# Patient Record
Sex: MALE | Race: WHITE
[De-identification: names, ages, dates, MRNs, and addresses within clinical notes are randomized per-mention and may not be internally consistent; named-entity substitution may affect disease eponyms.]

---

## 2021-07-13 ENCOUNTER — RX RENEWAL (OUTPATIENT)
Age: 81
End: 2021-07-13

## 2021-08-11 ENCOUNTER — NON-APPOINTMENT (OUTPATIENT)
Age: 81
End: 2021-08-11

## 2021-08-11 ENCOUNTER — APPOINTMENT (OUTPATIENT)
Dept: INTERNAL MEDICINE | Facility: CLINIC | Age: 81
End: 2021-08-11
Payer: MEDICARE

## 2021-08-11 ENCOUNTER — LABORATORY RESULT (OUTPATIENT)
Age: 81
End: 2021-08-11

## 2021-08-11 VITALS
TEMPERATURE: 98.1 F | OXYGEN SATURATION: 99 % | HEIGHT: 72 IN | RESPIRATION RATE: 17 BRPM | BODY MASS INDEX: 35.21 KG/M2 | SYSTOLIC BLOOD PRESSURE: 160 MMHG | WEIGHT: 260 LBS | DIASTOLIC BLOOD PRESSURE: 86 MMHG | HEART RATE: 77 BPM

## 2021-08-11 DIAGNOSIS — M19.90 UNSPECIFIED OSTEOARTHRITIS, UNSPECIFIED SITE: ICD-10-CM

## 2021-08-11 DIAGNOSIS — Z13.31 ENCOUNTER FOR SCREENING FOR DEPRESSION: ICD-10-CM

## 2021-08-11 DIAGNOSIS — Z78.9 OTHER SPECIFIED HEALTH STATUS: ICD-10-CM

## 2021-08-11 DIAGNOSIS — Z13.39 ENCOUNTER FOR SCREENING EXAM FOR OTHER MENTAL HEALTH AND BEHAVIORAL DISORDERS: ICD-10-CM

## 2021-08-11 DIAGNOSIS — E11.65 TYPE 2 DIABETES MELLITUS WITH OTHER SPECIFIED COMPLICATION: ICD-10-CM

## 2021-08-11 DIAGNOSIS — Z86.69 PERSONAL HISTORY OF OTHER DISEASES OF THE NERVOUS SYSTEM AND SENSE ORGANS: ICD-10-CM

## 2021-08-11 DIAGNOSIS — Z86.79 PERSONAL HISTORY OF OTHER DISEASES OF THE CIRCULATORY SYSTEM: ICD-10-CM

## 2021-08-11 DIAGNOSIS — Z87.09 PERSONAL HISTORY OF OTHER DISEASES OF THE RESPIRATORY SYSTEM: ICD-10-CM

## 2021-08-11 DIAGNOSIS — Z71.89 OTHER SPECIFIED COUNSELING: ICD-10-CM

## 2021-08-11 DIAGNOSIS — E11.69 TYPE 2 DIABETES MELLITUS WITH OTHER SPECIFIED COMPLICATION: ICD-10-CM

## 2021-08-11 PROCEDURE — 99214 OFFICE O/P EST MOD 30 MIN: CPT | Mod: 25

## 2021-08-11 PROCEDURE — 93000 ELECTROCARDIOGRAM COMPLETE: CPT | Mod: 59

## 2021-08-11 PROCEDURE — 99497 ADVNCD CARE PLAN 30 MIN: CPT

## 2021-08-11 PROCEDURE — G0444 DEPRESSION SCREEN ANNUAL: CPT | Mod: 59

## 2021-08-11 PROCEDURE — G0439: CPT

## 2021-08-11 PROCEDURE — G0442 ANNUAL ALCOHOL SCREEN 15 MIN: CPT | Mod: 59

## 2021-08-11 PROCEDURE — 36415 COLL VENOUS BLD VENIPUNCTURE: CPT

## 2021-08-11 PROCEDURE — G0447 BEHAVIOR COUNSEL OBESITY 15M: CPT | Mod: 59

## 2021-08-11 RX ORDER — HYDROCORTISONE 25 MG/G
2.5 CREAM TOPICAL 3 TIMES DAILY
Refills: 0 | Status: ACTIVE | COMMUNITY

## 2021-08-11 RX ORDER — TRAZODONE HYDROCHLORIDE 50 MG/1
50 TABLET ORAL
Refills: 0 | Status: ACTIVE | COMMUNITY

## 2021-08-11 RX ORDER — NEOMYCIN AND POLYMYXIN B SULFATES, BACITRACIN ZINC, AND HYDROCORTISONE 3.5; 5000; 400; 1 MG/G; [IU]/G; [IU]/G; MG/G
OINTMENT TOPICAL
Refills: 0 | Status: ACTIVE | COMMUNITY

## 2021-08-11 RX ORDER — TAMSULOSIN HYDROCHLORIDE 0.4 MG/1
0.4 CAPSULE ORAL
Refills: 0 | Status: ACTIVE | COMMUNITY

## 2021-08-12 LAB
25(OH)D3 SERPL-MCNC: 36.3 NG/ML
ALBUMIN SERPL ELPH-MCNC: 4.9 G/DL
ALP BLD-CCNC: 61 U/L
ALT SERPL-CCNC: 22 U/L
ANION GAP SERPL CALC-SCNC: 16 MMOL/L
APPEARANCE: ABNORMAL
AST SERPL-CCNC: 19 U/L
BASOPHILS # BLD AUTO: 0.03 K/UL
BASOPHILS NFR BLD AUTO: 0.5 %
BILIRUB SERPL-MCNC: 1 MG/DL
BILIRUBIN URINE: NEGATIVE
BLOOD URINE: NEGATIVE
BUN SERPL-MCNC: 25 MG/DL
CALCIUM SERPL-MCNC: 9.7 MG/DL
CHLORIDE SERPL-SCNC: 103 MMOL/L
CHOLEST SERPL-MCNC: 222 MG/DL
CO2 SERPL-SCNC: 25 MMOL/L
COLOR: YELLOW
CREAT SERPL-MCNC: 1.64 MG/DL
EOSINOPHIL # BLD AUTO: 0.25 K/UL
EOSINOPHIL NFR BLD AUTO: 4.2 %
ESTIMATED AVERAGE GLUCOSE: 151 MG/DL
GGT SERPL-CCNC: 26 U/L
GLUCOSE QUALITATIVE U: NEGATIVE
GLUCOSE SERPL-MCNC: 139 MG/DL
HBA1C MFR BLD HPLC: 6.9 %
HCT VFR BLD CALC: 39.6 %
HDLC SERPL-MCNC: 54 MG/DL
HGB BLD-MCNC: 12.2 G/DL
IMM GRANULOCYTES NFR BLD AUTO: 0.2 %
KETONES URINE: NEGATIVE
LDLC SERPL CALC-MCNC: 144 MG/DL
LEUKOCYTE ESTERASE URINE: NEGATIVE
LYMPHOCYTES # BLD AUTO: 1.45 K/UL
LYMPHOCYTES NFR BLD AUTO: 24.5 %
MAN DIFF?: NORMAL
MCHC RBC-ENTMCNC: 30.4 PG
MCHC RBC-ENTMCNC: 30.8 GM/DL
MCV RBC AUTO: 98.8 FL
MONOCYTES # BLD AUTO: 0.7 K/UL
MONOCYTES NFR BLD AUTO: 11.8 %
NEUTROPHILS # BLD AUTO: 3.49 K/UL
NEUTROPHILS NFR BLD AUTO: 58.8 %
NITRITE URINE: NEGATIVE
NONHDLC SERPL-MCNC: 168 MG/DL
PH URINE: 5.5
PLATELET # BLD AUTO: 273 K/UL
POTASSIUM SERPL-SCNC: 5.2 MMOL/L
PROT SERPL-MCNC: 7.5 G/DL
PROTEIN URINE: NORMAL
PSA FREE FLD-MCNC: 34 %
PSA FREE SERPL-MCNC: 0.42 NG/ML
PSA SERPL-MCNC: 1.25 NG/ML
RBC # BLD: 4.01 M/UL
RBC # FLD: 13 %
SODIUM SERPL-SCNC: 144 MMOL/L
SPECIFIC GRAVITY URINE: 1.02
TRIGL SERPL-MCNC: 122 MG/DL
TSH SERPL-ACNC: 1.42 UIU/ML
UROBILINOGEN URINE: NORMAL
WBC # FLD AUTO: 5.93 K/UL

## 2021-08-13 ENCOUNTER — NON-APPOINTMENT (OUTPATIENT)
Age: 81
End: 2021-08-13

## 2021-08-15 NOTE — HEALTH RISK ASSESSMENT
[Good] : ~his/her~  mood as  good [1 or 2 (0 pts)] : 1 or 2 (0 points) [Never (0 pts)] : Never (0 points) [No falls in past year] : Patient reported no falls in the past year [0] : 2) Feeling down, depressed, or hopeless: Not at all (0) [PHQ-2 Negative - No further assessment needed] : PHQ-2 Negative - No further assessment needed [I have developed a follow-up plan documented below in the note.] : I have developed a follow-up plan documented below in the note. [Patient reported colonoscopy was normal] : Patient reported colonoscopy was normal [None] : None [Alone] : lives alone [Employed] : employed [College] : College [Single] : single [Feels Safe at Home] : Feels safe at home [Fully functional (bathing, dressing, toileting, transferring, walking, feeding)] : Fully functional (bathing, dressing, toileting, transferring, walking, feeding) [Fully functional (using the telephone, shopping, preparing meals, housekeeping, doing laundry, using] : Fully functional and needs no help or supervision to perform IADLs (using the telephone, shopping, preparing meals, housekeeping, doing laundry, using transportation, managing medications and managing finances) [Patient/Caregiver not ready to engage] : , patient/caregiver not ready to engage [Last Verification Date: ___] : Last Verification Date: [unfilled] [I will adhere to the patient's wishes.] : I will adhere to the patient's wishes. [Time Spent: ___ minutes] : Time Spent: [unfilled] minutes [No] : In the past 12 months have you used drugs other than those required for medical reasons? No [] : No [de-identified] : Lightly [Audit-CScore] : 0 [de-identified] : Standard [VIM8Donwn] : 0 [Change in mental status noted] : No change in mental status noted [Sexually Active] : not sexually active [Reports changes in hearing] : Reports no changes in hearing [Reports changes in vision] : Reports no changes in vision [ColonoscopyDate] : 2019 [FreeTextEntry2] :  [AdvancecareDate] : 08/21

## 2021-08-15 NOTE — ASSESSMENT
[FreeTextEntry1] : Medical Annual wellness visit completed:\par HRA completed and reviewed with patient\par Medical, family, surgical history reviewed with patient and updated\par List of current providers r/w patient and updated\par Vitals, BMI reviewed and discussed along with healthy BMI goals. Dietary counseling x 15 minutes provided\par Depression PHQ 9 completed and reviewed \par Annual safety assessment reviewed\par discussed advanced directives\par smoking cessation counseling provided\par Established routine screening and immunization schedules\par \par VACCINATION & OTHER TX RECOMMENDATIONS\par \par ASA preventative therapy\par Calcium/Vitamin D supplementation\par  \par Dietary counseling, nutrition referral\par risks vs. benefits d/w patient. routine vaccination and vaccination schedules and recommendation d/w patient\par \par Vaccines recommended: \par * pneumovax (once after 65) & prevnar\par * annual Influenza vaccine\par * Hep B vaccines\par * zostavax\par * Tdap\par \par Colorectal screening recommended; screening colonoscopy q10yr, flex sig q5yr, annual fecal occult testing\par BMD recommended biennially for osteoporosis screening\par Glaucoma screening recommended, annual optho evals\par Cardiovascular screening and blood tests recommended and discussed w/ patient, cholesterol screening and dietary counseling\par AAA recommended x 1\par \par \par DIABETIC recommendations:\par med nutrition counseling, nutrition referral \par annual podiatry evaluations and follow up\par annual optho eval/retinal exams\par routine blood tets, including FBS, a1c% and cholesterol panels\par \par \par Met with MANJU NEVES, who was willing to discuss advance care planning. \par Our advance care planning conversation included a discussion about:\par 1. The value and importance of advance care planning.\par 2. Experiences with loved ones who have been seriously ill or have .\par 3. Exploration of personal, cultural, or spiritual beliefs that might influence medical decisions.\par 4. Exploration of goals of care in the event of a sudden injury or illness.\par 5. Identification of a health care agent.\par 6. Review and update, or completion of, an advance directive.\par Start time: 10 End time: 1015\par \par diet and exercise weight loss.  Low-salt low-fat ADA diet/ htn- Discussed diabetes physiology\par - Discussed importance of monitoring blood glucose levels\par - Encouraged a low fat/low cholesterol diet\par - Discussed symptoms of hyperglycemia and hypoglycemia\par - Discussed ADA glucose goals\par - Discussed  HGB A1c and the effects of blood glucose on the level\par - Discussed Healthy eating, avoidance of concentrated sweets, and to include vegetables by at least 2 meals a day\par - Discussed regular exercise\par - Discussed importance of follow up physician visits Limit intake of Sodium (Salt) to less than 2 grams a day to prevent fluid retention-swelling or worsening of symptoms. The importance of keeping the blood pressure at or below 130/80 to prevent stroke, heart attacks, kidney failure, blindness, and loss of limbs was  low chol diet. Avoid fried foods, red meat, butter, eggs, hard cheeses. Use canola or olive oil preferred. ::  was established in which goals would be set, monitoring would be done, and problem solving would also be addressed. The patient would be assisted using behavior change techniques, such as self-help and counseling through behavioral modification: Problem solving using hypnosis and positive medical reinforcement to achieve agreed-upon goals.\par \par Symptomatic patients : Test for influenza, if positive, treat for influenza and do not continue below. \par 1. Fever plus cough or shortness of breath : Test for RVP and COVID-19.\par 2.Indirect, circumstantial or unclear exposure to COVID-19, or other concerning cases not meeting above criteria: Please call [facility] to discuss testing. \par +++ All above cases must be reported to the [facility] registry. +++\par \par Asymptomatic patients: \par 1. Known first-degree direct-contact exposure to positive COVID-19 patient but asymptomatic: No testing PLUS 14 day self-quarantine. Pt to call if symptoms develop. Report to [facility] Registry.\par 2. No known exposure and asymptomatic, referred from outside healthcare organization: Please call [facility] to discuss testing. \par 3.All other asymptomatic patients with no known exposures: no testing, no exceptions.\par \par \par

## 2021-08-15 NOTE — COUNSELING
[Fall prevention counseling provided] : Fall prevention counseling provided [Adequate lighting] : Adequate lighting [No throw rugs] : No throw rugs [Use proper foot wear] : Use proper foot wear [Use recommended devices] : Use recommended devices [Behavioral health counseling provided] : Behavioral health counseling provided [Sleep ___ hours/day] : Sleep [unfilled] hours/day [Engage in a relaxing activity] : Engage in a relaxing activity [Plan in advance] : Plan in advance [Potential consequences of obesity discussed] : Potential consequences of obesity discussed [Benefits of weight loss discussed] : Benefits of weight loss discussed [Structured Weight Management Program suggested:] : Structured weight management program suggested [Encouraged to maintain food diary] : Encouraged to maintain food diary [Encouraged to increase physical activity] : Encouraged to increase physical activity [Encouraged to use exercise tracking device] : Encouraged to use exercise tracking device [Target Wt Loss Goal ___] : Weight Loss Goals: Target weight loss goal [unfilled] lbs [Weigh Self Weekly] : weigh self weekly [Decrease Portions] : decrease portions [____ min/wk Activity] : [unfilled] min/wk activity [Keep Food Diary] : keep food diary [FreeTextEntry4] : 15 [de-identified] : Dscd.D/E wt.loss needs to loose 10% TBW.DSCD.self monitoring, goal setting,problem solving w-b mod.portion control, avoidence of skipping meals, high glycemic foods,snacking and mindless eating in front of the tv.Suggested use of small plates and not keepingall of the food on the dinner table and leaving it at the stove top.Pt was also told to calorie count and an donna was recommended DSCD exercising 20 minutes per day:dscd:med /pharmaco bariatric tx options.\par \par  - Encouraged a low fat/low cholesterol diet\par  - Discussed Healthy eating, avoidance of concentrated sweets, and to include vegetables by at least 3 meals a day\par  - encouraged low glycemic fruits, grains and vegetables and a diet high in plant protein\par  - Discussed regular exercise\par  - Discussed importance of follow up physician visits\par \par - Discussed diabetes physiology\par - Discussed importance of monitoring blood glucose levels\par - Encouraged a low fat/low cholesterol diet\par - Discussed symptoms of hyperglycemia and hypoglycemia\par - Discussed ADA glucose goals\par - Discussed  HGB A1c and the effects of blood glucose on the level\par - Discussed Healthy eating, avoidance of concentrated sweets, and to include vegetables by at least 2 meals a day\par - Discussed regular exercise\par - Discussed importance of follow up physician visits\par \par \par \par low chol diet. Avoid fried foods, red meat, butter, eggs, hard cheeses. Use canola or olive oil preferred.\par \par  - Encouraged a low fat/low cholesterol diet\par  - Discussed Healthy eating, avoidance of concentrated sweets, and to include vegetables by at least 3 meals a day\par  - encouraged low glycemic fruits, grains and vegetables and a diet high in plant protein\par  - Discussed regular exercise\par  - Discussed importance of follow up physician visits\par \par Bp stable, continue with medications, dash diet, exercise, and dietary management.Continue to check home Bp’s.\par \par  [None] : None

## 2021-08-15 NOTE — HISTORY OF PRESENT ILLNESS
[FreeTextEntry1] : f/u, GHM, hx of diabetes, obesity, colon ca, lipids, annual wellness, arthritis [de-identified] : 81 yr old male pt presents for f/u, GHM, annual wellness\par \par General health maintenance diabetes, obesity, colon ca, lipids, routine blood work. \par Pt is a retired , Gnosticism\par Denies chest pain, shortness of breath and dizziness.\par Patient has a history of colon cancer had a resection.  Patient had colonoscopy in 2019 which was unremarkable. \par Patient denies fever chills cough chest pain shortness of breath patient admits to weight gain. \par \par Pt voices no further complaints.\par ROS as documented below.

## 2021-09-22 ENCOUNTER — RX RENEWAL (OUTPATIENT)
Age: 81
End: 2021-09-22

## 2021-11-26 ENCOUNTER — APPOINTMENT (OUTPATIENT)
Dept: INTERNAL MEDICINE | Facility: CLINIC | Age: 81
End: 2021-11-26
Payer: MEDICARE

## 2021-11-26 VITALS
RESPIRATION RATE: 17 BRPM | TEMPERATURE: 97.4 F | BODY MASS INDEX: 33.86 KG/M2 | DIASTOLIC BLOOD PRESSURE: 84 MMHG | OXYGEN SATURATION: 97 % | HEIGHT: 72 IN | WEIGHT: 250 LBS | SYSTOLIC BLOOD PRESSURE: 152 MMHG | HEART RATE: 89 BPM

## 2021-11-26 PROCEDURE — 99213 OFFICE O/P EST LOW 20 MIN: CPT

## 2021-11-26 NOTE — REVIEW OF SYSTEMS
[Negative] : Heme/Lymph [Nasal Discharge] : nasal discharge [Sore Throat] : sore throat [Cough] : cough

## 2021-11-27 NOTE — COUNSELING
[Fall prevention counseling provided] : Fall prevention counseling provided [Adequate lighting] : Adequate lighting [No throw rugs] : No throw rugs [Use proper foot wear] : Use proper foot wear [Use recommended devices] : Use recommended devices [de-identified] : - Discussed diabetes physiology\par - Discussed importance of monitoring blood glucose levels\par - Encouraged a low fat/low cholesterol diet\par - Discussed symptoms of hyperglycemia and hypoglycemia\par - Discussed ADA glucose goals\par - Discussed  HGB A1c and the effects of blood glucose on the level\par - Discussed Healthy eating, avoidance of concentrated sweets, and to include vegetables by at least 2 meals a day\par - Discussed regular exercise\par - Discussed importance of follow up physician visits\par \par \par \par low chol diet. Avoid fried foods, red meat, butter, eggs, hard cheeses. Use canola or olive oil preferred.\par \par  - Encouraged a low fat/low cholesterol diet\par  - Discussed Healthy eating, avoidance of concentrated sweets, and to include vegetables by at least 3 meals a day\par  - encouraged low glycemic fruits, grains and vegetables and a diet high in plant protein\par  - Discussed regular exercise\par  - Discussed importance of follow up physician visits\par \par Symptomatic patients : Test for influenza, if positive, treat for influenza and do not continue below. \par 1. Fever plus cough or shortness of breath : Test for RVP and COVID-19.\par 2.Indirect, circumstantial or unclear exposure to COVID-19, or other concerning cases not meeting above criteria: Please call [facility] to discuss testing. \par +++ All above cases must be reported to the [facility] registry. +++\par \par Asymptomatic patients: \par 1. Known first-degree direct-contact exposure to positive COVID-19 patient but asymptomatic: No testing PLUS 14 day self-quarantine. Pt to call if symptoms develop. Report to [facility].\par 2. No known exposure and asymptomatic, referred from outside healthcare organization: Please call [facility] to discuss testing. \par 3.All other asymptomatic patients with no known exposures: no testing, no exceptions.\par \par  [None] : None

## 2021-11-27 NOTE — HEALTH RISK ASSESSMENT
[Good] : ~his/her~  mood as  good [] : No [No] : No [No falls in past year] : Patient reported no falls in the past year [Change in mental status noted] : No change in mental status noted [Language] : denies difficulty with language [None] : None [Alone] : lives alone [College] : College [Single] : single [FreeTextEntry2] :

## 2021-11-27 NOTE — PHYSICAL EXAM

## 2021-11-27 NOTE — HISTORY OF PRESENT ILLNESS
[FreeTextEntry1] : cc/cough, sneezing, f/u, GHM, hx of diabetes, obesity, colon ca, lipids, arthritis [de-identified] : 81 yr old male pt presents for f/u, GHM\par \par 82 y/o M presents with an acute c/o mild cough, fatigue and sneezing onset x ~ 1 week\par UTD on Covid Vacc\par \par General health maintenance diabetes, obesity, colon ca, lipids\par Pt is a retired , [employer]\par Denies chest pain, shortness of breath and dizziness.\par Patient has a history of colon cancer had a resection.  Patient had colonoscopy in 2019 which was unremarkable. \par Patient denies fever chills cough chest pain shortness of breath patient admits to weight gain. \par \par Pt voices no further complaints.\par ROS as documented below.

## 2021-12-22 ENCOUNTER — APPOINTMENT (OUTPATIENT)
Dept: INTERNAL MEDICINE | Facility: CLINIC | Age: 81
End: 2021-12-22
Payer: MEDICARE

## 2021-12-22 VITALS
BODY MASS INDEX: 33.86 KG/M2 | RESPIRATION RATE: 18 BRPM | SYSTOLIC BLOOD PRESSURE: 154 MMHG | WEIGHT: 250 LBS | HEIGHT: 72 IN | OXYGEN SATURATION: 95 % | DIASTOLIC BLOOD PRESSURE: 86 MMHG | HEART RATE: 74 BPM

## 2021-12-22 DIAGNOSIS — J06.9 ACUTE UPPER RESPIRATORY INFECTION, UNSPECIFIED: ICD-10-CM

## 2021-12-22 DIAGNOSIS — Z20.822 CONTACT WITH AND (SUSPECTED) EXPOSURE TO COVID-19: ICD-10-CM

## 2021-12-22 PROCEDURE — 99213 OFFICE O/P EST LOW 20 MIN: CPT | Mod: CS

## 2021-12-22 RX ORDER — AZITHROMYCIN 250 MG/1
250 TABLET, FILM COATED ORAL
Qty: 1 | Refills: 0 | Status: ACTIVE | COMMUNITY
Start: 2021-11-26 | End: 1900-01-01

## 2021-12-22 RX ORDER — AZITHROMYCIN 250 MG/1
250 TABLET, FILM COATED ORAL
Qty: 1 | Refills: 0 | Status: ACTIVE | COMMUNITY
Start: 2021-12-22 | End: 1900-01-01

## 2021-12-23 PROBLEM — J06.9 URI, ACUTE: Status: RESOLVED | Noted: 2021-11-26 | Resolved: 2021-12-26

## 2021-12-23 PROBLEM — Z20.822 CONTACT WITH AND (SUSPECTED) EXPOSURE TO COVID-19: Status: ACTIVE | Noted: 2021-12-22

## 2021-12-23 NOTE — HISTORY OF PRESENT ILLNESS
[Patient presents to the office today for COVID-19 evaluation and testing.] : Patient presents to the office today for COVID-19 evaluation and testing. [With Confirmed Case] : patient exposed to a confirmed case of COVID-19 [Age >= 60 years] : age >= 60 years [None] : none [Clear] : clear [Speaks in full sentences] : speaks in full sentences [Normal O2 sat at rest] : normal O2 sat at rest [Grossly normal, interacts, not tired or weak] : grossly normal, interacts, not tired or weak [COVID-19 testing ordered and specimen obtained] : COVID-19 testing ordered and specimen obtained [] : mild cough [FreeTextEntry1] : 81 year old male presents himself today for a F/U,GHM.\par Hx of hyperlipidemia,colon cancer,diabetes and obesity.\par \par Patient is here today because he was exposed a few days ago with a positive covid.\par He is fully vaccinated for covid-19.\par Patient has been having nasal congestion and a sore throat.\par Otherwise patient is stable.\par \par Voices no further complaints.\par ROS as documented below.\par Denies fever,cough,chills,body aches and SOB.

## 2021-12-24 ENCOUNTER — NON-APPOINTMENT (OUTPATIENT)
Age: 81
End: 2021-12-24

## 2021-12-24 LAB
RAPID RVP RESULT: DETECTED
SARS-COV-2 RNA PNL RESP NAA+PROBE: DETECTED

## 2021-12-24 NOTE — HISTORY OF PRESENT ILLNESS
[Home] : at home, [unfilled] , at the time of the visit. [Verbal consent obtained from patient] : the patient, [unfilled] [FreeTextEntry8] : Spoke with patient this morning has nasal congestion denies shortness of breath or change in mental status was told to quarantine for 10 days. Symptomatic patients : Test for influenza, if positive, treat for influenza and do not continue below. \par 1. Fever plus cough or shortness of breath : Test for RVP and COVID-19.\par 2.Indirect, circumstantial or unclear exposure to COVID-19, or other concerning cases not meeting above criteria: Please call [facility] to discuss testing. \par +++ All above cases must be reported to the [facility] registry. +++\par \par Asymptomatic patients: \par 1. Known first-degree direct-contact exposure to positive COVID-19 patient but asymptomatic: No testing PLUS 14 day self-quarantine. Pt to call if symptoms develop. Report to [facility] Registry.\par 2. No known exposure and asymptomatic, referred from outside healthcare organization: Please call [facility] to discuss testing. \par 3.All other asymptomatic patients with no known exposures: no testing, no exceptions.\par \par

## 2022-01-09 ENCOUNTER — RX RENEWAL (OUTPATIENT)
Age: 82
End: 2022-01-09

## 2022-02-04 ENCOUNTER — RX RENEWAL (OUTPATIENT)
Age: 82
End: 2022-02-04

## 2022-03-04 ENCOUNTER — APPOINTMENT (OUTPATIENT)
Dept: INTERNAL MEDICINE | Facility: CLINIC | Age: 82
End: 2022-03-04
Payer: MEDICARE

## 2022-03-04 VITALS
DIASTOLIC BLOOD PRESSURE: 90 MMHG | HEIGHT: 72 IN | BODY MASS INDEX: 35.21 KG/M2 | HEART RATE: 84 BPM | OXYGEN SATURATION: 97 % | TEMPERATURE: 97.4 F | WEIGHT: 260 LBS | RESPIRATION RATE: 17 BRPM | SYSTOLIC BLOOD PRESSURE: 172 MMHG

## 2022-03-04 DIAGNOSIS — U09.9 POST COVID-19 CONDITION, UNSPECIFIED: ICD-10-CM

## 2022-03-04 DIAGNOSIS — Z85.038 PERSONAL HISTORY OF OTHER MALIGNANT NEOPLASM OF LARGE INTESTINE: ICD-10-CM

## 2022-03-04 DIAGNOSIS — R09.82 POSTNASAL DRIP: ICD-10-CM

## 2022-03-04 DIAGNOSIS — R41.89 OTHER SYMPTOMS AND SIGNS INVOLVING COGNITIVE FUNCTIONS AND AWARENESS: ICD-10-CM

## 2022-03-04 PROCEDURE — 99214 OFFICE O/P EST MOD 30 MIN: CPT | Mod: 25

## 2022-03-04 PROCEDURE — 36415 COLL VENOUS BLD VENIPUNCTURE: CPT

## 2022-03-06 PROBLEM — R09.82 POSTNASAL DRIP: Status: ACTIVE | Noted: 2022-03-06

## 2022-03-06 PROBLEM — Z85.038 HISTORY OF MALIGNANT NEOPLASM OF COLON: Status: RESOLVED | Noted: 2021-08-11 | Resolved: 2022-03-06

## 2022-03-06 LAB
25(OH)D3 SERPL-MCNC: 37.4 NG/ML
ALBUMIN SERPL ELPH-MCNC: 5 G/DL
ALP BLD-CCNC: 60 U/L
ALT SERPL-CCNC: 21 U/L
ANION GAP SERPL CALC-SCNC: 15 MMOL/L
APPEARANCE: CLEAR
AST SERPL-CCNC: 20 U/L
BASOPHILS # BLD AUTO: 0.03 K/UL
BASOPHILS NFR BLD AUTO: 0.5 %
BILIRUB SERPL-MCNC: 0.8 MG/DL
BILIRUBIN URINE: NEGATIVE
BLOOD URINE: NEGATIVE
BUN SERPL-MCNC: 32 MG/DL
CALCIUM SERPL-MCNC: 10 MG/DL
CHLORIDE SERPL-SCNC: 104 MMOL/L
CHOLEST SERPL-MCNC: 221 MG/DL
CO2 SERPL-SCNC: 22 MMOL/L
COLOR: NORMAL
CREAT SERPL-MCNC: 1.47 MG/DL
EGFR: 48 ML/MIN/1.73M2
EOSINOPHIL # BLD AUTO: 0.19 K/UL
EOSINOPHIL NFR BLD AUTO: 3.2 %
ESTIMATED AVERAGE GLUCOSE: 134 MG/DL
GGT SERPL-CCNC: 24 U/L
GLUCOSE QUALITATIVE U: NEGATIVE
GLUCOSE SERPL-MCNC: 101 MG/DL
HBA1C MFR BLD HPLC: 6.3 %
HCT VFR BLD CALC: 40.2 %
HDLC SERPL-MCNC: 61 MG/DL
HGB BLD-MCNC: 12.5 G/DL
IMM GRANULOCYTES NFR BLD AUTO: 0.2 %
KETONES URINE: NEGATIVE
LDLC SERPL CALC-MCNC: 136 MG/DL
LEUKOCYTE ESTERASE URINE: NEGATIVE
LYMPHOCYTES # BLD AUTO: 1.12 K/UL
LYMPHOCYTES NFR BLD AUTO: 18.7 %
MAN DIFF?: NORMAL
MCHC RBC-ENTMCNC: 30.6 PG
MCHC RBC-ENTMCNC: 31.1 GM/DL
MCV RBC AUTO: 98.5 FL
MONOCYTES # BLD AUTO: 0.77 K/UL
MONOCYTES NFR BLD AUTO: 12.8 %
NEUTROPHILS # BLD AUTO: 3.88 K/UL
NEUTROPHILS NFR BLD AUTO: 64.6 %
NITRITE URINE: NEGATIVE
NONHDLC SERPL-MCNC: 160 MG/DL
PH URINE: 5
PLATELET # BLD AUTO: 254 K/UL
POTASSIUM SERPL-SCNC: 5.5 MMOL/L
PROT SERPL-MCNC: 7.2 G/DL
PROTEIN URINE: NORMAL
PSA SERPL-MCNC: 1.23 NG/ML
RBC # BLD: 4.08 M/UL
RBC # FLD: 13 %
SODIUM SERPL-SCNC: 141 MMOL/L
SPECIFIC GRAVITY URINE: 1.02
TRIGL SERPL-MCNC: 117 MG/DL
TSH SERPL-ACNC: 1.68 UIU/ML
UROBILINOGEN URINE: NORMAL
WBC # FLD AUTO: 6 K/UL

## 2022-03-06 NOTE — PHYSICAL EXAM
[No Acute Distress] : no acute distress [Well Nourished] : well nourished [Well Developed] : well developed [Well-Appearing] : well-appearing [Normal Sclera/Conjunctiva] : normal sclera/conjunctiva [PERRL] : pupils equal round and reactive to light [EOMI] : extraocular movements intact [Normal Outer Ear/Nose] : the outer ears and nose were normal in appearance [No JVD] : no jugular venous distention [No Lymphadenopathy] : no lymphadenopathy [Supple] : supple [Thyroid Normal, No Nodules] : the thyroid was normal and there were no nodules present [No Respiratory Distress] : no respiratory distress  [No Accessory Muscle Use] : no accessory muscle use [Clear to Auscultation] : lungs were clear to auscultation bilaterally [Normal Rate] : normal rate  [Regular Rhythm] : with a regular rhythm [Normal S1, S2] : normal S1 and S2 [No Murmur] : no murmur heard [No Carotid Bruits] : no carotid bruits [No Abdominal Bruit] : a ~M bruit was not heard ~T in the abdomen [No Varicosities] : no varicosities [Pedal Pulses Present] : the pedal pulses are present [No Edema] : there was no peripheral edema [No Palpable Aorta] : no palpable aorta [No Extremity Clubbing/Cyanosis] : no extremity clubbing/cyanosis [Soft] : abdomen soft [Non Tender] : non-tender [Non-distended] : non-distended [No Masses] : no abdominal mass palpated [No HSM] : no HSM [Normal Bowel Sounds] : normal bowel sounds [Normal Posterior Cervical Nodes] : no posterior cervical lymphadenopathy [Normal Anterior Cervical Nodes] : no anterior cervical lymphadenopathy [No Spinal Tenderness] : no spinal tenderness [No Joint Swelling] : no joint swelling [Grossly Normal Strength/Tone] : grossly normal strength/tone [No Rash] : no rash [Coordination Grossly Intact] : coordination grossly intact [No Focal Deficits] : no focal deficits [Normal Gait] : normal gait [Deep Tendon Reflexes (DTR)] : deep tendon reflexes were 2+ and symmetric [Normal Affect] : the affect was normal [Normal Insight/Judgement] : insight and judgment were intact [de-identified] : Tenderness spasm lower back decreased range of motion [de-identified] : PNDrip visualized, no erythema/tonsillar edema/exudate [de-identified] : Low back pain

## 2022-03-06 NOTE — HEALTH RISK ASSESSMENT
[Good] : ~his/her~  mood as  good [Never] : Never [No] : No [No falls in past year] : Patient reported no falls in the past year [0] : 2) Feeling down, depressed, or hopeless: Not at all (0) [PHQ-2 Negative - No further assessment needed] : PHQ-2 Negative - No further assessment needed [None] : None [Alone] : lives alone [Employed] : employed [Single] : single [Feels Safe at Home] : Feels safe at home [Fully functional (bathing, dressing, toileting, transferring, walking, feeding)] : Fully functional (bathing, dressing, toileting, transferring, walking, feeding) [Fully functional (using the telephone, shopping, preparing meals, housekeeping, doing laundry, using] : Fully functional and needs no help or supervision to perform IADLs (using the telephone, shopping, preparing meals, housekeeping, doing laundry, using transportation, managing medications and managing finances) [Reviewed no changes] : Reviewed, no changes [Change in mental status noted] : No change in mental status noted [Language] : denies difficulty with language [Behavior] : denies difficulty with behavior [Learning/Retaining New Information] : denies difficulty learning/retaining new information [Handling Complex Tasks] : denies difficulty handling complex tasks [Spatial Ability and Orientation] : denies difficulty with spatial ability and orientation [FreeTextEntry2] : Jain

## 2022-03-06 NOTE — COUNSELING
[Fall prevention counseling provided] : Fall prevention counseling provided [Adequate lighting] : Adequate lighting [No throw rugs] : No throw rugs [Use proper foot wear] : Use proper foot wear [Use recommended devices] : Use recommended devices [Behavioral health counseling provided] : Behavioral health counseling provided [Sleep ___ hours/day] : Sleep [unfilled] hours/day [Engage in a relaxing activity] : Engage in a relaxing activity [Plan in advance] : Plan in advance [Potential consequences of obesity discussed] : Potential consequences of obesity discussed [Benefits of weight loss discussed] : Benefits of weight loss discussed [Structured Weight Management Program suggested:] : Structured weight management program suggested [Encouraged to maintain food diary] : Encouraged to maintain food diary [Encouraged to increase physical activity] : Encouraged to increase physical activity [Encouraged to use exercise tracking device] : Encouraged to use exercise tracking device [Target Wt Loss Goal ___] : Weight Loss Goals: Target weight loss goal [unfilled] lbs [Weigh Self Weekly] : weigh self weekly [Decrease Portions] : decrease portions [____ min/wk Activity] : [unfilled] min/wk activity [Keep Food Diary] : keep food diary [None] : None [Good understanding] : Patient has a good understanding of lifestyle changes and steps needed to achieve self management goal [de-identified] : Symptomatic patients : Test for influenza, if positive, treat for influenza and do not continue below. \par 1. Fever plus cough or shortness of breath : Test for RVP and COVID-19.\par 2.Indirect, circumstantial or unclear exposure to COVID-19, or other concerning cases not meeting above criteria: Please call [facility] to discuss testing. \par +++ All above cases must be reported to the [facility] registry. +++\par \par Asymptomatic patients: \par 1. Known first-degree direct-contact exposure to positive COVID-19 patient but asymptomatic: No testing PLUS 14 day self-quarantine. Pt to call if symptoms develop. Report to [facility] Registry.\par 2. No known exposure and asymptomatic, referred from outside healthcare organization: Please call [facility] to discuss testing. \par 3.All other asymptomatic patients with no known exposures: no testing, no exceptions.\par \par take medications as advised. Rest, ice/heat massage therapy/myofascial  release no heavy lift or twisting avoid prolonged positioning. We reviewed proper lifting mechanics continue with a home stretching program. Return to office in 2 weeks\par \par - Discussed diabetes physiology\par - Discussed importance of monitoring blood glucose levels\par - Encouraged a low fat/low cholesterol diet\par - Discussed symptoms of hyperglycemia and hypoglycemia\par - Discussed ADA glucose goals\par - Discussed  HGB A1c and the effects of blood glucose on the level\par - Discussed Healthy eating, avoidance of concentrated sweets, and to include vegetables by at least 2 meals a day\par - Discussed regular exercise\par - Discussed importance of follow up physician visits\par \par \par \par low chol diet. Avoid fried foods, red meat, butter, eggs, hard cheeses. Use canola or olive oil preferred.\par \par  - Encouraged a low fat/low cholesterol diet\par  - Discussed Healthy eating, avoidance of concentrated sweets, and to include vegetables by at least 3 meals a day\par  - encouraged low glycemic fruits, grains and vegetables and a diet high in plant protein\par  - Discussed regular exercise\par  - Discussed importance of follow up physician visits\par Dscd.D/E wt.loss needs to loose 10% TBW.DSCD.self monitoring, goal setting,problem solving w-b mod.portion control, avoidence of skipping meals, high glycemic foods,snacking and mindless eating in front of the tv.Suggested use of small plates and not keepingall of the food on the dinner table and leaving it at the stove top.Pt was also told to calorie count and an donna was recommended DSCD exercising 20 minutes per day:dscd:med /pharmaco bariatric tx options.\par \par  - Encouraged a low fat/low cholesterol diet\par  - Discussed Healthy eating, avoidance of concentrated sweets, and to include vegetables by at least 3 meals a day\par  - encouraged low glycemic fruits, grains and vegetables and a diet high in plant protein\par  - Discussed regular exercise\par  - Discussed importance of follow up physician visits\par \par 1. Make an appointment with your doctor\par Begin your fall-prevention plan by making an appointment with your doctor. Be prepared to answer questions such as:\par \par What medications are you taking? Make a list of your prescription and over-the-counter medications and supplements, or bring them with you to the appointment. Your doctor can review your medications for side effects and interactions that may increase your risk of falling. To help with fall prevention, your doctor may consider weaning you off medications that make you tired or affect your thinking, such as sedatives and some types of antidepressants.\par Have you fallen before? Write down the details, including when, where and how you fell. Be prepared to discuss instances when you almost fell but were caught by someone or managed to grab hold of something just in time. Details such as these may help your doctor identify specific fall-prevention strategies.\par Could your health conditions cause a fall? Certain eye and ear disorders may increase your risk of falls. Be prepared to discuss your health conditions and how comfortable you are when you walk - for example, do you feel any dizziness, joint pain, shortness of breath, or numbness in your feet and legs when you walk? Your doctor may evaluate your muscle strength, balance and walking style (gait) as well.\par 2. Keep moving\par Physical activity can go a long way toward fall prevention. With your doctor's OK, consider activities such as walking, water workouts or chapito chi - a gentle exercise that involves slow and graceful dance-like movements. Such activities reduce the risk of falls by improving strength, balance, coordination and flexibility.\par \par If you avoid physical activity because you're afraid it will make a fall more likely, tell your doctor. He or she may recommend carefully monitored exercise programs or refer you to a physical therapist. The physical therapist can create a custom exercise program aimed at improving your balance, flexibility, muscle strength and gait.\par \par 3. Wear sensible shoes\par Consider changing your footwear as part of your fall-prevention plan. High heels, floppy slippers and shoes with slick soles can make you slip, stumble and fall. So can walking in your stocking feet. Instead, wear properly fitting, sturdy shoes with nonskid soles. Sensible shoes may also reduce joint pain.\par \par 4. Remove home hazards\par Take a look around your home. Your living room, kitchen, bedroom, bathroom, hallways and stairways may be filled with hazards. To make your home safer:\par \par Remove boxes, newspapers, electrical cords and phone cords from walkways.\par Move coffee tables, magazine racks and plant stands from high-traffic areas.\par Secure loose rugs with double-faced tape, tacks or a slip-resistant backing - or remove loose rugs from your home.\par Repair loose, wooden floorboards and carpeting right away.\par Store clothing, dishes, food and other necessities within easy reach.\par Immediately clean spilled liquids, grease or food.\par Use nonslip mats in your bathtub or shower. Use a bath seat, which allows you to sit while showering.\par 5. Light up your living space\par Keep your home brightly lit to avoid tripping on objects that are hard to see. Also:\par \par Place night lights in your bedroom, bathroom and hallways.\par Place a lamp within reach of your bed for middle-of-the-night needs.\par Make clear paths to light switches that aren't near room entrances. Consider trading traditional switches for glow-in-the-dark or illuminated switches.\par Turn on the lights before going up or down stairs.\par Store flashlights in easy-to-find places in case of power outages.\par 6. Use assistive devices\par Your doctor might recommend using a cane or walker to keep you steady. Other assistive devices can help, too. For example:\par \par Hand rails for both sides of stairways\par Nonslip treads for bare-wood steps\par A raised toilet seat or one with armrests\par Grab bars for the shower or tub\par A sturdy plastic seat for the shower or tub - plus a hand-held shower nozzle for bathing while sitting down\par If necessary, ask your doctor for a referral to an occupational therapist. He or she can help you brainstorm other fall-prevention strategies. Some solutions are easily installed and relatively inexpensive. Others may require professional help or a larger investment. If you're concerned about the cost, remember that an investment in fall prevention is an investment in your independence.\par \par \par take medications as advised. Rest, ice/heat massage therapy/myofascial  release no heavy lift or twisting avoid prolonged positioning. We reviewed proper lifting mechanics continue with a home stretching program. Return to office in 2 weeks\par \par 15 minutes face-to-face counseling provided.\par

## 2022-03-06 NOTE — DATA REVIEWED
[FreeTextEntry1] : Hemoglobin 12.5 cholesterol 221 , potassium 5.5.  BUN 32 creatinine 1.47 GFR 48.  A1c 6.3.

## 2022-03-06 NOTE — HISTORY OF PRESENT ILLNESS
[FreeTextEntry1] :  f/u, GHM, hx of diabetes, obesity, colon ca, lipids, arthritis\par post covid syndrome- intermittent dyspnea, congestion\par intermittent low back pain [de-identified] : 81 yr old male pt presents for f/u, GHM\par c/o post covid syndrome, intermittent dyspnea, congestion, PNDrip, brain fog\par c/o intermittent low back pain\par UTD on Covid Vacc\par \par General health maintenance diabetes, obesity, colon ca, lipids\par Pt is a retired , Muslim\par Denies chest pain, shortness of breath and dizziness.\par Patient has a history of colon cancer had a resection.  Patient had colonoscopy in 2019 which was unremarkable. \par Patient denies fever chills cough chest pain shortness of breath patient admits to weight gain. \par \par Pt voices no further complaints.\par ROS as documented below.

## 2022-03-28 ENCOUNTER — RX CHANGE (OUTPATIENT)
Age: 82
End: 2022-03-28

## 2022-03-28 RX ORDER — MOMETASONE 50 UG/1
50 SPRAY, METERED NASAL
Qty: 17 | Refills: 2 | Status: DISCONTINUED | COMMUNITY
Start: 2022-03-04 | End: 2022-03-28

## 2022-04-20 ENCOUNTER — APPOINTMENT (OUTPATIENT)
Dept: INTERNAL MEDICINE | Facility: CLINIC | Age: 82
End: 2022-04-20
Payer: MEDICARE

## 2022-04-20 VITALS
DIASTOLIC BLOOD PRESSURE: 86 MMHG | SYSTOLIC BLOOD PRESSURE: 164 MMHG | HEIGHT: 72 IN | RESPIRATION RATE: 17 BRPM | TEMPERATURE: 96.8 F | WEIGHT: 255 LBS | OXYGEN SATURATION: 97 % | BODY MASS INDEX: 34.54 KG/M2 | HEART RATE: 97 BPM

## 2022-04-20 DIAGNOSIS — N18.2 CHRONIC KIDNEY DISEASE, STAGE 2 (MILD): ICD-10-CM

## 2022-04-20 DIAGNOSIS — I49.3 VENTRICULAR PREMATURE DEPOLARIZATION: ICD-10-CM

## 2022-04-20 PROCEDURE — 99214 OFFICE O/P EST MOD 30 MIN: CPT

## 2022-04-23 NOTE — PHYSICAL EXAM
[No Acute Distress] : no acute distress [Well Nourished] : well nourished [Well Developed] : well developed [Well-Appearing] : well-appearing [Normal Sclera/Conjunctiva] : normal sclera/conjunctiva [PERRL] : pupils equal round and reactive to light [EOMI] : extraocular movements intact [Normal Outer Ear/Nose] : the outer ears and nose were normal in appearance [Normal Oropharynx] : the oropharynx was normal [No JVD] : no jugular venous distention [No Lymphadenopathy] : no lymphadenopathy [Supple] : supple [Thyroid Normal, No Nodules] : the thyroid was normal and there were no nodules present [No Respiratory Distress] : no respiratory distress  [No Accessory Muscle Use] : no accessory muscle use [Clear to Auscultation] : lungs were clear to auscultation bilaterally [Normal Rate] : normal rate  [Regular Rhythm] : with a regular rhythm [Normal S1, S2] : normal S1 and S2 [No Murmur] : no murmur heard [No Carotid Bruits] : no carotid bruits [No Abdominal Bruit] : a ~M bruit was not heard ~T in the abdomen [No Varicosities] : no varicosities [Pedal Pulses Present] : the pedal pulses are present [No Edema] : there was no peripheral edema [No Palpable Aorta] : no palpable aorta [No Extremity Clubbing/Cyanosis] : no extremity clubbing/cyanosis [Soft] : abdomen soft [Non Tender] : non-tender [Non-distended] : non-distended [No Masses] : no abdominal mass palpated [No HSM] : no HSM [Normal Bowel Sounds] : normal bowel sounds [Normal Posterior Cervical Nodes] : no posterior cervical lymphadenopathy [Normal Anterior Cervical Nodes] : no anterior cervical lymphadenopathy [No CVA Tenderness] : no CVA  tenderness [No Spinal Tenderness] : no spinal tenderness [No Joint Swelling] : no joint swelling [Grossly Normal Strength/Tone] : grossly normal strength/tone [No Rash] : no rash [Coordination Grossly Intact] : coordination grossly intact [No Focal Deficits] : no focal deficits [Normal Gait] : normal gait [Deep Tendon Reflexes (DTR)] : deep tendon reflexes were 2+ and symmetric [Normal Affect] : the affect was normal [Normal Insight/Judgement] : insight and judgment were intact [de-identified] : b/l cerumen impaction, TMs occluded [de-identified] : Repeat blood pressure 136/84 [de-identified] : mildly positive hallpike

## 2022-04-23 NOTE — HEALTH RISK ASSESSMENT
[Fair] :  ~his/her~ mood as fair [Never] : Never [No] : No [No falls in past year] : Patient reported no falls in the past year [0] : 2) Feeling down, depressed, or hopeless: Not at all (0) [None] : None [Alone] : lives alone [Employed] : employed [College] : College [Single] : single [Feels Safe at Home] : Feels safe at home [Fully functional (bathing, dressing, toileting, transferring, walking, feeding)] : Fully functional (bathing, dressing, toileting, transferring, walking, feeding) [Fully functional (using the telephone, shopping, preparing meals, housekeeping, doing laundry, using] : Fully functional and needs no help or supervision to perform IADLs (using the telephone, shopping, preparing meals, housekeeping, doing laundry, using transportation, managing medications and managing finances) [Reviewed no changes] : Reviewed, no changes [Change in mental status noted] : No change in mental status noted [Learning/Retaining New Information] : denies difficulty learning/retaining new information [Handling Complex Tasks] : denies difficulty handling complex tasks [Reasoning] : denies difficulty with reasoning [Spatial Ability and Orientation] : denies difficulty with spatial ability and orientation [FreeTextEntry2] :

## 2022-04-23 NOTE — COUNSELING
[Fall prevention counseling provided] : Fall prevention counseling provided [Adequate lighting] : Adequate lighting [No throw rugs] : No throw rugs [Use proper foot wear] : Use proper foot wear [Use recommended devices] : Use recommended devices [Behavioral health counseling provided] : Behavioral health counseling provided [Sleep ___ hours/day] : Sleep [unfilled] hours/day [Engage in a relaxing activity] : Engage in a relaxing activity [Plan in advance] : Plan in advance [Potential consequences of obesity discussed] : Potential consequences of obesity discussed [Benefits of weight loss discussed] : Benefits of weight loss discussed [Structured Weight Management Program suggested:] : Structured weight management program suggested [Encouraged to maintain food diary] : Encouraged to maintain food diary [Encouraged to increase physical activity] : Encouraged to increase physical activity [Target Wt Loss Goal ___] : Weight Loss Goals: Target weight loss goal [unfilled] lbs [Weigh Self Weekly] : weigh self weekly [Decrease Portions] : decrease portions [____ min/wk Activity] : [unfilled] min/wk activity [Keep Food Diary] : keep food diary [None] : None [de-identified] : Symptomatic patients : Test for influenza, if positive, treat for influenza and do not continue below. \par 1. Fever plus cough or shortness of breath : Test for RVP and COVID-19.\par 2.Indirect, circumstantial or unclear exposure to COVID-19, or other concerning cases not meeting above criteria: Please call [facility] to discuss testing. \par +++ All above cases must be reported to the [facility] registry. +++\par \par Asymptomatic patients: \par 1. Known first-degree direct-contact exposure to positive COVID-19 patient but asymptomatic: No testing PLUS 14 day self-quarantine. Pt to call if symptoms develop. Report to [facility] Registry.\par 2. No known exposure and asymptomatic, referred from outside healthcare organization: Please call [facility] to discuss testing. \par 3.All other asymptomatic patients with no known exposures: no testing, no exceptions.\par \par 1. Make an appointment with your doctor\par Begin your fall-prevention plan by making an appointment with your doctor. Be prepared to answer questions such as:\par \par What medications are you taking? Make a list of your prescription and over-the-counter medications and supplements, or bring them with you to the appointment. Your doctor can review your medications for side effects and interactions that may increase your risk of falling. To help with fall prevention, your doctor may consider weaning you off medications that make you tired or affect your thinking, such as sedatives and some types of antidepressants.\par Have you fallen before? Write down the details, including when, where and how you fell. Be prepared to discuss instances when you almost fell but were caught by someone or managed to grab hold of something just in time. Details such as these may help your doctor identify specific fall-prevention strategies.\par Could your health conditions cause a fall? Certain eye and ear disorders may increase your risk of falls. Be prepared to discuss your health conditions and how comfortable you are when you walk - for example, do you feel any dizziness, joint pain, shortness of breath, or numbness in your feet and legs when you walk? Your doctor may evaluate your muscle strength, balance and walking style (gait) as well.\par 2. Keep moving\par Physical activity can go a long way toward fall prevention. With your doctor's OK, consider activities such as walking, water workouts or chapito chi - a gentle exercise that involves slow and graceful dance-like movements. Such activities reduce the risk of falls by improving strength, balance, coordination and flexibility.\par \par If you avoid physical activity because you're afraid it will make a fall more likely, tell your doctor. He or she may recommend carefully monitored exercise programs or refer you to a physical therapist. The physical therapist can create a custom exercise program aimed at improving your balance, flexibility, muscle strength and gait.\par \par 3. Wear sensible shoes\par Consider changing your footwear as part of your fall-prevention plan. High heels, floppy slippers and shoes with slick soles can make you slip, stumble and fall. So can walking in your stocking feet. Instead, wear properly fitting, sturdy shoes with nonskid soles. Sensible shoes may also reduce joint pain.\par \par 4. Remove home hazards\par Take a look around your home. Your living room, kitchen, bedroom, bathroom, hallways and stairways may be filled with hazards. To make your home safer:\par \par Remove boxes, newspapers, electrical cords and phone cords from walkways.\par Move coffee tables, magazine racks and plant stands from high-traffic areas.\par Secure loose rugs with double-faced tape, tacks or a slip-resistant backing - or remove loose rugs from your home.\par Repair loose, wooden floorboards and carpeting right away.\par Store clothing, dishes, food and other necessities within easy reach.\par Immediately clean spilled liquids, grease or food.\par Use nonslip mats in your bathtub or shower. Use a bath seat, which allows you to sit while showering.\par 5. Light up your living space\par Keep your home brightly lit to avoid tripping on objects that are hard to see. Also:\par \par Place night lights in your bedroom, bathroom and hallways.\par Place a lamp within reach of your bed for middle-of-the-night needs.\par Make clear paths to light switches that aren't near room entrances. Consider trading traditional switches for glow-in-the-dark or illuminated switches.\par Turn on the lights before going up or down stairs.\par Store flashlights in easy-to-find places in case of power outages.\par 6. Use assistive devices\par Your doctor might recommend using a cane or walker to keep you steady. Other assistive devices can help, too. For example:\par \par Hand rails for both sides of stairways\par Nonslip treads for bare-wood steps\par A raised toilet seat or one with armrests\par Grab bars for the shower or tub\par A sturdy plastic seat for the shower or tub - plus a hand-held shower nozzle for bathing while sitting down\par If necessary, ask your doctor for a referral to an occupational therapist. He or she can help you brainstorm other fall-prevention strategies. Some solutions are easily installed and relatively inexpensive. Others may require professional help or a larger investment. If you're concerned about the cost, remember that an investment in fall prevention is an investment in your independence.\par \par \par - Discussed diabetes physiology\par - Discussed importance of monitoring blood glucose levels\par - Encouraged a low fat/low cholesterol diet\par - Discussed symptoms of hyperglycemia and hypoglycemia\par - Discussed ADA glucose goals\par - Discussed  HGB A1c and the effects of blood glucose on the level\par - Discussed Healthy eating, avoidance of concentrated sweets, and to include vegetables by at least 2 meals a day\par - Discussed regular exercise\par - Discussed importance of follow up physician visits\par \par \par \par diet and exercise weight loss.  Low-salt low-fat ADA diet/ htn- Discussed diabetes physiology\par - Discussed importance of monitoring blood glucose levels\par - Encouraged a low fat/low cholesterol diet\par - Discussed symptoms of hyperglycemia and hypoglycemia\par - Discussed ADA glucose goals\par - Discussed  HGB A1c and the effects of blood glucose on the level\par - Discussed Healthy eating, avoidance of concentrated sweets, and to include vegetables by at least 2 meals a day\par - Discussed regular exercise\par - Discussed importance of follow up physician visits Limit intake of Sodium (Salt) to less than 2 grams a day to prevent fluid retention-swelling or worsening of symptoms. The importance of keeping the blood pressure at or below 130/80 to prevent stroke, heart attacks, kidney failure, blindness, and loss of limbs was  low chol diet. Avoid fried foods, red meat, butter, eggs, hard cheeses. Use canola or olive oil preferred. ::  was established in which goals would be set, monitoring would be done, and problem solving would also be addressed. The patient would be assisted using behavior change techniques, such as self-help and counseling through behavioral modification: Problem solving using hypnosis and positive medical reinforcement to achieve agreed-upon goals.\par low chol diet. Avoid fried foods, red meat, butter, eggs, hard cheeses. Use canola or olive oil preferred.\par \par  - Encouraged a low fat/low cholesterol diet\par  - Discussed Healthy eating, avoidance of concentrated sweets, and to include vegetables by at least 3 meals a day\par  - encouraged low glycemic fruits, grains and vegetables and a diet high in plant protein\par  - Discussed regular exercise\par  - Discussed importance of follow up physician visits\par \par take medications as advised. Rest, ice/heat massage therapy/myofascial  release no heavy lift or twisting avoid prolonged positioning. We reviewed proper lifting mechanics continue with a home stretching program. Return to office in 2 weeks\par \par

## 2022-04-23 NOTE — HISTORY OF PRESENT ILLNESS
[FreeTextEntry1] :  f/u, GHM, hx of diabetes, obesity, colon ca, lipids, arthritis\par intermittent low back pain--->under ortho care--- down to 400 Ibuprofen\par cerumen impaction b/l chief complaint today of low back pain which is become chronic, has developed dizziness which is positional.\par acute complaint dizziness [de-identified] : 81 yr old male pt presents for f/u, GHM\par acute complaint of positional dizziness- onset x 2-3 days, denies head trauma, nausea, vomiting.\par c/o intermittent low back pain--> under care of ortho, down to 400 ibuprofen (from 800)\par UTD on Covid Vacc\par \par General health maintenance diabetes, obesity, colon ca, lipids\par Pt is a retired , [employer]\par Denies chest pain, shortness of breath and dizziness.\par Patient has a history of colon cancer had a resection.  Patient had colonoscopy in 2019 which was unremarkable. \par Patient denies fever chills cough chest pain shortness of breath patient admits to weight gain. \par \par Pt voices no further complaints.\par ROS as documented below.

## 2022-05-02 ENCOUNTER — RX CHANGE (OUTPATIENT)
Age: 82
End: 2022-05-02

## 2022-05-02 RX ORDER — MOMETASONE 50 UG/1
50 SPRAY, METERED NASAL
Qty: 17 | Refills: 2 | Status: DISCONTINUED | COMMUNITY
Start: 2022-03-28 | End: 2022-05-02

## 2022-05-02 RX ORDER — MOMETASONE 50 UG/1
50 SPRAY, METERED NASAL
Qty: 51 | Refills: 1 | Status: ACTIVE | COMMUNITY
Start: 2022-05-02 | End: 1900-01-01

## 2022-05-03 ENCOUNTER — APPOINTMENT (OUTPATIENT)
Dept: INTERNAL MEDICINE | Facility: CLINIC | Age: 82
End: 2022-05-03
Payer: MEDICARE

## 2022-05-03 VITALS
BODY MASS INDEX: 35.35 KG/M2 | SYSTOLIC BLOOD PRESSURE: 162 MMHG | HEIGHT: 72 IN | RESPIRATION RATE: 18 BRPM | TEMPERATURE: 97.9 F | HEART RATE: 103 BPM | OXYGEN SATURATION: 96 % | DIASTOLIC BLOOD PRESSURE: 84 MMHG | WEIGHT: 261 LBS

## 2022-05-03 DIAGNOSIS — J02.9 ACUTE PHARYNGITIS, UNSPECIFIED: ICD-10-CM

## 2022-05-03 DIAGNOSIS — R53.83 OTHER FATIGUE: ICD-10-CM

## 2022-05-03 PROCEDURE — 99214 OFFICE O/P EST MOD 30 MIN: CPT | Mod: 25

## 2022-05-04 ENCOUNTER — NON-APPOINTMENT (OUTPATIENT)
Age: 82
End: 2022-05-04

## 2022-05-04 LAB
25(OH)D3 SERPL-MCNC: 43.7 NG/ML
ALBUMIN SERPL ELPH-MCNC: 4.8 G/DL
ALP BLD-CCNC: 61 U/L
ALT SERPL-CCNC: 20 U/L
ANION GAP SERPL CALC-SCNC: 15 MMOL/L
APPEARANCE: CLEAR
AST SERPL-CCNC: 16 U/L
BASOPHILS # BLD AUTO: 0.03 K/UL
BASOPHILS NFR BLD AUTO: 0.5 %
BILIRUB SERPL-MCNC: 1.1 MG/DL
BILIRUBIN URINE: NEGATIVE
BLOOD URINE: NEGATIVE
BUN SERPL-MCNC: 27 MG/DL
CALCIUM SERPL-MCNC: 9.5 MG/DL
CHLORIDE SERPL-SCNC: 101 MMOL/L
CHOLEST SERPL-MCNC: 208 MG/DL
CO2 SERPL-SCNC: 23 MMOL/L
COLOR: NORMAL
CREAT SERPL-MCNC: 1.5 MG/DL
EGFR: 46 ML/MIN/1.73M2
EOSINOPHIL # BLD AUTO: 0.21 K/UL
EOSINOPHIL NFR BLD AUTO: 3.5 %
ESTIMATED AVERAGE GLUCOSE: 134 MG/DL
GGT SERPL-CCNC: 23 U/L
GLUCOSE QUALITATIVE U: NEGATIVE
GLUCOSE SERPL-MCNC: 116 MG/DL
HBA1C MFR BLD HPLC: 6.3 %
HCT VFR BLD CALC: 40.7 %
HDLC SERPL-MCNC: 59 MG/DL
HGB BLD-MCNC: 13.1 G/DL
IMM GRANULOCYTES NFR BLD AUTO: 0.5 %
KETONES URINE: NEGATIVE
LDLC SERPL CALC-MCNC: 123 MG/DL
LEUKOCYTE ESTERASE URINE: NEGATIVE
LYMPHOCYTES # BLD AUTO: 1.21 K/UL
LYMPHOCYTES NFR BLD AUTO: 20.4 %
MAN DIFF?: NORMAL
MCHC RBC-ENTMCNC: 31.2 PG
MCHC RBC-ENTMCNC: 32.2 GM/DL
MCV RBC AUTO: 96.9 FL
MONOCYTES # BLD AUTO: 0.55 K/UL
MONOCYTES NFR BLD AUTO: 9.3 %
NEUTROPHILS # BLD AUTO: 3.9 K/UL
NEUTROPHILS NFR BLD AUTO: 65.8 %
NITRITE URINE: NEGATIVE
NONHDLC SERPL-MCNC: 149 MG/DL
PH URINE: 6
PLATELET # BLD AUTO: 268 K/UL
POTASSIUM SERPL-SCNC: 4.8 MMOL/L
PROT SERPL-MCNC: 6.8 G/DL
PROTEIN URINE: NORMAL
RBC # BLD: 4.2 M/UL
RBC # FLD: 12.4 %
SODIUM SERPL-SCNC: 139 MMOL/L
SPECIFIC GRAVITY URINE: 1.02
TRIGL SERPL-MCNC: 131 MG/DL
TSH SERPL-ACNC: 1.42 UIU/ML
UROBILINOGEN URINE: NORMAL
WBC # FLD AUTO: 5.93 K/UL

## 2022-05-05 ENCOUNTER — NON-APPOINTMENT (OUTPATIENT)
Age: 82
End: 2022-05-05

## 2022-05-05 LAB
RAPID RVP RESULT: NOT DETECTED
SARS-COV-2 RNA PNL RESP NAA+PROBE: NOT DETECTED
UREA BREATH TEST QL: NEGATIVE

## 2022-05-06 ENCOUNTER — NON-APPOINTMENT (OUTPATIENT)
Age: 82
End: 2022-05-06

## 2022-05-11 PROBLEM — J02.9 SORE THROAT: Status: RESOLVED | Noted: 2022-05-03 | Resolved: 2022-06-02

## 2022-05-11 PROBLEM — R53.83 FATIGUE: Status: ACTIVE | Noted: 2022-05-11

## 2022-05-11 NOTE — PHYSICAL EXAM
[No Acute Distress] : no acute distress [Well Nourished] : well nourished [Well Developed] : well developed [Well-Appearing] : well-appearing [Normal Sclera/Conjunctiva] : normal sclera/conjunctiva [PERRL] : pupils equal round and reactive to light [EOMI] : extraocular movements intact [Normal Outer Ear/Nose] : the outer ears and nose were normal in appearance [Normal Oropharynx] : the oropharynx was normal [No JVD] : no jugular venous distention [No Lymphadenopathy] : no lymphadenopathy [Supple] : supple [Thyroid Normal, No Nodules] : the thyroid was normal and there were no nodules present [No Respiratory Distress] : no respiratory distress  [No Accessory Muscle Use] : no accessory muscle use [Clear to Auscultation] : lungs were clear to auscultation bilaterally [Regular Rhythm] : with a regular rhythm [Normal S1, S2] : normal S1 and S2 [No Murmur] : no murmur heard [No Carotid Bruits] : no carotid bruits [No Abdominal Bruit] : a ~M bruit was not heard ~T in the abdomen [No Varicosities] : no varicosities [Pedal Pulses Present] : the pedal pulses are present [No Edema] : there was no peripheral edema [No Palpable Aorta] : no palpable aorta [No Extremity Clubbing/Cyanosis] : no extremity clubbing/cyanosis [Soft] : abdomen soft [Non Tender] : non-tender [Non-distended] : non-distended [No Masses] : no abdominal mass palpated [No HSM] : no HSM [Normal Bowel Sounds] : normal bowel sounds [Normal Posterior Cervical Nodes] : no posterior cervical lymphadenopathy [Normal Anterior Cervical Nodes] : no anterior cervical lymphadenopathy [No CVA Tenderness] : no CVA  tenderness [No Spinal Tenderness] : no spinal tenderness [No Joint Swelling] : no joint swelling [Grossly Normal Strength/Tone] : grossly normal strength/tone [No Rash] : no rash [Coordination Grossly Intact] : coordination grossly intact [No Focal Deficits] : no focal deficits [Normal Gait] : normal gait [Deep Tendon Reflexes (DTR)] : deep tendon reflexes were 2+ and symmetric [Normal Affect] : the affect was normal [Normal Insight/Judgement] : insight and judgment were intact [de-identified] : mild tachy

## 2022-05-11 NOTE — HISTORY OF PRESENT ILLNESS
[FreeTextEntry1] : Followup [de-identified] : chronic gerd.  h pylori testing\par feeling fatigued \par labs followup testing\par requested repeat covid testing.\par BP elevated / heart rate irregular.  needs cardio eval.

## 2022-05-11 NOTE — REVIEW OF SYSTEMS
[Fatigue] : fatigue [Heartburn] : heartburn [Fever] : no fever [Chills] : no chills [Discharge] : no discharge [Pain] : no pain [Vision Problems] : no vision problems [Itching] : no itching [Earache] : no earache [Hearing Loss] : no hearing loss [Nasal Discharge] : no nasal discharge [Sore Throat] : no sore throat [Chest Pain] : no chest pain [Palpitations] : no palpitations [Claudication] : no  leg claudication [Shortness Of Breath] : no shortness of breath [Wheezing] : no wheezing [Cough] : no cough [Dysuria] : no dysuria [Incontinence] : no incontinence [Frequency] : no frequency [Joint Pain] : no joint pain [Back Pain] : no back pain [Itching] : no itching [Skin Rash] : no skin rash [Headache] : no headache [Dizziness] : no dizziness [Anxiety] : no anxiety [Depression] : no depression [Easy Bleeding] : no easy bleeding [Easy Bruising] : no easy bruising

## 2022-06-02 ENCOUNTER — RX RENEWAL (OUTPATIENT)
Age: 82
End: 2022-06-02

## 2022-06-02 RX ORDER — CLOTRIMAZOLE AND BETAMETHASONE DIPROPIONATE 10; .5 MG/G; MG/G
1-0.05 CREAM TOPICAL
Qty: 60 | Refills: 1 | Status: ACTIVE | COMMUNITY
Start: 2022-06-02 | End: 1900-01-01

## 2022-08-15 ENCOUNTER — RX RENEWAL (OUTPATIENT)
Age: 82
End: 2022-08-15

## 2022-10-27 ENCOUNTER — RX RENEWAL (OUTPATIENT)
Age: 82
End: 2022-10-27

## 2022-12-14 ENCOUNTER — RX RENEWAL (OUTPATIENT)
Age: 82
End: 2022-12-14

## 2023-01-06 ENCOUNTER — RX RENEWAL (OUTPATIENT)
Age: 83
End: 2023-01-06

## 2023-01-16 ENCOUNTER — RX RENEWAL (OUTPATIENT)
Age: 83
End: 2023-01-16

## 2023-03-06 ENCOUNTER — APPOINTMENT (OUTPATIENT)
Dept: INTERNAL MEDICINE | Facility: CLINIC | Age: 83
End: 2023-03-06
Payer: MEDICARE

## 2023-03-06 VITALS
DIASTOLIC BLOOD PRESSURE: 84 MMHG | WEIGHT: 261 LBS | OXYGEN SATURATION: 97 % | SYSTOLIC BLOOD PRESSURE: 142 MMHG | HEART RATE: 88 BPM | HEIGHT: 72 IN | TEMPERATURE: 96.9 F | BODY MASS INDEX: 35.35 KG/M2 | RESPIRATION RATE: 18 BRPM

## 2023-03-06 DIAGNOSIS — Z00.00 ENCOUNTER FOR GENERAL ADULT MEDICAL EXAMINATION W/OUT ABNORMAL FINDINGS: ICD-10-CM

## 2023-03-06 DIAGNOSIS — M54.50 LOW BACK PAIN, UNSPECIFIED: ICD-10-CM

## 2023-03-06 DIAGNOSIS — R21 RASH AND OTHER NONSPECIFIC SKIN ERUPTION: ICD-10-CM

## 2023-03-06 PROCEDURE — 99214 OFFICE O/P EST MOD 30 MIN: CPT | Mod: 25

## 2023-03-06 PROCEDURE — 36415 COLL VENOUS BLD VENIPUNCTURE: CPT

## 2023-03-07 ENCOUNTER — NON-APPOINTMENT (OUTPATIENT)
Age: 83
End: 2023-03-07

## 2023-03-07 ENCOUNTER — RX CHANGE (OUTPATIENT)
Age: 83
End: 2023-03-07

## 2023-03-07 PROBLEM — R21 RASH, SKIN: Status: ACTIVE | Noted: 2023-03-06

## 2023-03-07 PROBLEM — M54.50 LOW BACK PAIN: Status: ACTIVE | Noted: 2022-03-04

## 2023-03-07 LAB
25(OH)D3 SERPL-MCNC: 37.1 NG/ML
ALBUMIN SERPL ELPH-MCNC: 4.7 G/DL
ALP BLD-CCNC: 57 U/L
ALT SERPL-CCNC: 20 U/L
ANION GAP SERPL CALC-SCNC: 13 MMOL/L
APPEARANCE: CLEAR
AST SERPL-CCNC: 14 U/L
BASOPHILS # BLD AUTO: 0.04 K/UL
BASOPHILS NFR BLD AUTO: 0.7 %
BILIRUB SERPL-MCNC: 0.8 MG/DL
BILIRUBIN URINE: NEGATIVE
BLOOD URINE: NEGATIVE
BUN SERPL-MCNC: 28 MG/DL
CALCIUM SERPL-MCNC: 9.9 MG/DL
CHLORIDE SERPL-SCNC: 101 MMOL/L
CHOLEST SERPL-MCNC: 204 MG/DL
CO2 SERPL-SCNC: 25 MMOL/L
COLOR: NORMAL
CREAT SERPL-MCNC: 1.84 MG/DL
EGFR: 36 ML/MIN/1.73M2
EOSINOPHIL # BLD AUTO: 0.38 K/UL
EOSINOPHIL NFR BLD AUTO: 6.5 %
ESTIMATED AVERAGE GLUCOSE: 131 MG/DL
FOLATE SERPL-MCNC: 10.8 NG/ML
GGT SERPL-CCNC: 27 U/L
GLUCOSE QUALITATIVE U: NEGATIVE
GLUCOSE SERPL-MCNC: 123 MG/DL
HBA1C MFR BLD HPLC: 6.2 %
HCT VFR BLD CALC: 36.7 %
HDLC SERPL-MCNC: 49 MG/DL
HGB BLD-MCNC: 11.2 G/DL
IMM GRANULOCYTES NFR BLD AUTO: 0.5 %
KETONES URINE: NEGATIVE
LDLC SERPL CALC-MCNC: 129 MG/DL
LEUKOCYTE ESTERASE URINE: NEGATIVE
LYMPHOCYTES # BLD AUTO: 1.21 K/UL
LYMPHOCYTES NFR BLD AUTO: 20.7 %
MAN DIFF?: NORMAL
MCHC RBC-ENTMCNC: 30.5 GM/DL
MCHC RBC-ENTMCNC: 31.2 PG
MCV RBC AUTO: 102.2 FL
MONOCYTES # BLD AUTO: 0.78 K/UL
MONOCYTES NFR BLD AUTO: 13.4 %
NEUTROPHILS # BLD AUTO: 3.4 K/UL
NEUTROPHILS NFR BLD AUTO: 58.2 %
NITRITE URINE: NEGATIVE
NONHDLC SERPL-MCNC: 155 MG/DL
PH URINE: 6
PLATELET # BLD AUTO: 287 K/UL
POTASSIUM SERPL-SCNC: 6.1 MMOL/L
PROT SERPL-MCNC: 7 G/DL
PROTEIN URINE: NORMAL
PSA FREE FLD-MCNC: 34 %
PSA FREE SERPL-MCNC: 0.47 NG/ML
PSA SERPL-MCNC: 1.38 NG/ML
RBC # BLD: 3.59 M/UL
RBC # FLD: 12.5 %
SODIUM SERPL-SCNC: 138 MMOL/L
SPECIFIC GRAVITY URINE: 1.02
TRIGL SERPL-MCNC: 129 MG/DL
TSH SERPL-ACNC: 1.32 UIU/ML
UROBILINOGEN URINE: NORMAL
VIT B12 SERPL-MCNC: 674 PG/ML
WBC # FLD AUTO: 5.84 K/UL

## 2023-03-07 RX ORDER — COLISTIN SULFATE, NEOMYCIN SULFATE, THONZONIUM BROMIDE AND HYDROCORTISONE ACETATE 3; 3.3; .5; 1 MG/ML; MG/ML; MG/ML; MG/ML
3.3-3-10-0.5 SUSPENSION AURICULAR (OTIC)
Qty: 10 | Refills: 0 | Status: DISCONTINUED | COMMUNITY
Start: 2023-03-06 | End: 2023-03-07

## 2023-03-07 RX ORDER — COLISTIN SULFATE, NEOMYCIN SULFATE, THONZONIUM BROMIDE AND HYDROCORTISONE ACETATE 3; 3.3; .5; 1 MG/ML; MG/ML; MG/ML; MG/ML
3.3-3-10-0.5 SUSPENSION AURICULAR (OTIC)
Qty: 10 | Refills: 0 | Status: DISCONTINUED | COMMUNITY
Start: 2023-03-07 | End: 2023-03-07

## 2023-03-07 RX ORDER — LOSARTAN POTASSIUM 50 MG/1
50 TABLET, FILM COATED ORAL
Qty: 90 | Refills: 1 | Status: DISCONTINUED | COMMUNITY
Start: 2022-05-03 | End: 2023-03-07

## 2023-03-07 RX ORDER — METFORMIN HYDROCHLORIDE 1000 MG/1
1000 TABLET, COATED ORAL
Qty: 180 | Refills: 3 | Status: DISCONTINUED | COMMUNITY
Start: 2021-09-22 | End: 2023-03-07

## 2023-03-07 NOTE — REVIEW OF SYSTEMS
[Earache] : earache [Dizziness] : dizziness [Negative] : Heme/Lymph [Hearing Loss] : hearing loss [Back Pain] : back pain [Skin Rash] : skin rash

## 2023-03-07 NOTE — PHYSICAL EXAM
[No Acute Distress] : no acute distress [Well Nourished] : well nourished [Well Developed] : well developed [Well-Appearing] : well-appearing [Normal Sclera/Conjunctiva] : normal sclera/conjunctiva [PERRL] : pupils equal round and reactive to light [EOMI] : extraocular movements intact [Normal Outer Ear/Nose] : the outer ears and nose were normal in appearance [Normal Oropharynx] : the oropharynx was normal [No JVD] : no jugular venous distention [No Lymphadenopathy] : no lymphadenopathy [Supple] : supple [Thyroid Normal, No Nodules] : the thyroid was normal and there were no nodules present [No Respiratory Distress] : no respiratory distress  [No Accessory Muscle Use] : no accessory muscle use [Clear to Auscultation] : lungs were clear to auscultation bilaterally [Normal Rate] : normal rate  [Regular Rhythm] : with a regular rhythm [Normal S1, S2] : normal S1 and S2 [No Murmur] : no murmur heard [No Carotid Bruits] : no carotid bruits [No Abdominal Bruit] : a ~M bruit was not heard ~T in the abdomen [No Varicosities] : no varicosities [Pedal Pulses Present] : the pedal pulses are present [No Edema] : there was no peripheral edema [No Palpable Aorta] : no palpable aorta [No Extremity Clubbing/Cyanosis] : no extremity clubbing/cyanosis [Soft] : abdomen soft [Non Tender] : non-tender [Non-distended] : non-distended [No Masses] : no abdominal mass palpated [No HSM] : no HSM [Normal Bowel Sounds] : normal bowel sounds [No CVA Tenderness] : no CVA  tenderness [No Spinal Tenderness] : no spinal tenderness [No Joint Swelling] : no joint swelling [Grossly Normal Strength/Tone] : grossly normal strength/tone [No Rash] : no rash [Coordination Grossly Intact] : coordination grossly intact [No Focal Deficits] : no focal deficits [Normal Gait] : normal gait [Deep Tendon Reflexes (DTR)] : deep tendon reflexes were 2+ and symmetric [Normal Affect] : the affect was normal [Normal Insight/Judgement] : insight and judgment were intact [Normal Appearance] : normal in appearance [Declined Rectal Exam] : declined rectal exam [Normal] : affect was normal and insight and judgment were intact [de-identified] : Obese [de-identified] : b/l cerumen impaction, TMs occluded.  Right greater than left [de-identified] : Repeat blood pressure 136/84 [de-identified] : No CVA tenderness [de-identified] : Decreased range of motion lower spine back pain spasm [de-identified] : Stasis dermatitis right lower extremity

## 2023-03-07 NOTE — HISTORY OF PRESENT ILLNESS
[FreeTextEntry1] :  f/u, GHM, hx of diabetes, obesity, colon ca, lipids, arthritis\par intermittent low back pain--->under ortho care---cc/patient states that he fell last summer in his backyard and went and was unable to get up patient laid in the sun for 2 hours, was brought to [facility] was treated with IV fluids for.  Patient complaining of rash on right lower tibial area.  Patient also complaining of fullness in the left ear.\par cerumen impaction b/l chief complaint today of low back pain which is become chronic, has developed dizziness which is positional.\par acute complaint dizziness [de-identified] : 81 yr old male pt presents for f/u, GHM.... Patient tripped this summer and backyard was unable to get up has low back pain, continues to have dizziness feels ears might be impacted.\par acute complaint of positional dizziness-, denies head trauma, nausea, vomiting.\par c/o intermittent low back pain--> status post fall, dizziness with history of cerumen impaction, stasis dermatitis changes of right lower extremity under care of ortho, down to 400 ibuprofen (from 800)\par UTD on Covid Vacc\par \par General health maintenance diabetes, obesity, colon ca, lipids\par Pt is a retired , Taoist\par Denies chest pain, shortness of breath and dizziness.\par Patient has a history of colon cancer had a resection.  Patient had colonoscopy in 2019 which was unremarkable. \par Patient denies fever chills cough chest pain shortness of breath patient admits to weight gain. \par \par Pt voices no further complaints.\par ROS as documented below.

## 2023-03-07 NOTE — COUNSELING
[Fall prevention counseling provided] : Fall prevention counseling provided [Adequate lighting] : Adequate lighting [No throw rugs] : No throw rugs [Use proper foot wear] : Use proper foot wear [Use recommended devices] : Use recommended devices [Behavioral health counseling provided] : Behavioral health counseling provided [Sleep ___ hours/day] : Sleep [unfilled] hours/day [Engage in a relaxing activity] : Engage in a relaxing activity [Plan in advance] : Plan in advance [Potential consequences of obesity discussed] : Potential consequences of obesity discussed [Benefits of weight loss discussed] : Benefits of weight loss discussed [Structured Weight Management Program suggested:] : Structured weight management program suggested [Encouraged to maintain food diary] : Encouraged to maintain food diary [Encouraged to increase physical activity] : Encouraged to increase physical activity [Target Wt Loss Goal ___] : Weight Loss Goals: Target weight loss goal [unfilled] lbs [Weigh Self Weekly] : weigh self weekly [Decrease Portions] : decrease portions [____ min/wk Activity] : [unfilled] min/wk activity [Keep Food Diary] : keep food diary [None] : None [Good understanding] : Patient has a good understanding of lifestyle changes and steps needed to achieve self management goal [de-identified] : Symptomatic patients : Test for influenza, if positive, treat for influenza and do not continue below. \par 1. Fever plus cough or shortness of breath : Test for RVP and COVID-19.\par 2.Indirect, circumstantial or unclear exposure to COVID-19, or other concerning cases not meeting above criteria: Please call [facility] to discuss testing. \par +++ All above cases must be reported to the [facility] registry. +++\par \par Asymptomatic patients: \par 1. Known first-degree direct-contact exposure to positive COVID-19 patient but asymptomatic: No testing PLUS 14 day self-quarantine. Pt to call if symptoms develop. Report to [facility] Registry.\par 2. No known exposure and asymptomatic, referred from outside healthcare organization: Please call [facility] to discuss testing. \par 3.All other asymptomatic patients with no known exposures: no testing, no exceptions.\par \par 1. Make an appointment with your doctor\par Begin your fall-prevention plan by making an appointment with your doctor. Be prepared to answer questions such as:\par \par What medications are you taking? Make a list of your prescription and over-the-counter medications and supplements, or bring them with you to the appointment. Your doctor can review your medications for side effects and interactions that may increase your risk of falling. To help with fall prevention, your doctor may consider weaning you off medications that make you tired or affect your thinking, such as sedatives and some types of antidepressants.\par Have you fallen before? Write down the details, including when, where and how you fell. Be prepared to discuss instances when you almost fell but were caught by someone or managed to grab hold of something just in time. Details such as these may help your doctor identify specific fall-prevention strategies.\par Could your health conditions cause a fall? Certain eye and ear disorders may increase your risk of falls. Be prepared to discuss your health conditions and how comfortable you are when you walk - for example, do you feel any dizziness, joint pain, shortness of breath, or numbness in your feet and legs when you walk? Your doctor may evaluate your muscle strength, balance and walking style (gait) as well.\par 2. Keep moving\par Physical activity can go a long way toward fall prevention. With your doctor's OK, consider activities such as walking, water workouts or chapito chi - a gentle exercise that involves slow and graceful dance-like movements. Such activities reduce the risk of falls by improving strength, balance, coordination and flexibility.\par \par If you avoid physical activity because you're afraid it will make a fall more likely, tell your doctor. He or she may recommend carefully monitored exercise programs or refer you to a physical therapist. The physical therapist can create a custom exercise program aimed at improving your balance, flexibility, muscle strength and gait.\par \par 3. Wear sensible shoes\par Consider changing your footwear as part of your fall-prevention plan. High heels, floppy slippers and shoes with slick soles can make you slip, stumble and fall. So can walking in your stocking feet. Instead, wear properly fitting, sturdy shoes with nonskid soles. Sensible shoes may also reduce joint pain.\par \par 4. Remove home hazards\par Take a look around your home. Your living room, kitchen, bedroom, bathroom, hallways and stairways may be filled with hazards. To make your home safer:\par \par Remove boxes, newspapers, electrical cords and phone cords from walkways.\par Move coffee tables, magazine racks and plant stands from high-traffic areas.\par Secure loose rugs with double-faced tape, tacks or a slip-resistant backing - or remove loose rugs from your home.\par Repair loose, wooden floorboards and carpeting right away.\par Store clothing, dishes, food and other necessities within easy reach.\par Immediately clean spilled liquids, grease or food.\par Use nonslip mats in your bathtub or shower. Use a bath seat, which allows you to sit while showering.\par 5. Light up your living space\par Keep your home brightly lit to avoid tripping on objects that are hard to see. Also:\par \par Place night lights in your bedroom, bathroom and hallways.\par Place a lamp within reach of your bed for middle-of-the-night needs.\par Make clear paths to light switches that aren't near room entrances. Consider trading traditional switches for glow-in-the-dark or illuminated switches.\par Turn on the lights before going up or down stairs.\par Store flashlights in easy-to-find places in case of power outages.\par 6. Use assistive devices\par Your doctor might recommend using a cane or walker to keep you steady. Other assistive devices can help, too. For example:\par \par Hand rails for both sides of stairways\par Nonslip treads for bare-wood steps\par A raised toilet seat or one with armrests\par Grab bars for the shower or tub\par A sturdy plastic seat for the shower or tub - plus a hand-held shower nozzle for bathing while sitting down\par If necessary, ask your doctor for a referral to an occupational therapist. He or she can help you brainstorm other fall-prevention strategies. Some solutions are easily installed and relatively inexpensive. Others may require professional help or a larger investment. If you're concerned about the cost, remember that an investment in fall prevention is an investment in your independence.\par \par \par - Discussed diabetes physiology\par - Discussed importance of monitoring blood glucose levels\par - Encouraged a low fat/low cholesterol diet\par - Discussed symptoms of hyperglycemia and hypoglycemia\par - Discussed ADA glucose goals\par - Discussed  HGB A1c and the effects of blood glucose on the level\par - Discussed Healthy eating, avoidance of concentrated sweets, and to include vegetables by at least 2 meals a day\par - Discussed regular exercise\par - Discussed importance of follow up physician visits\par \par \par \par diet and exercise weight loss.  Low-salt low-fat ADA diet/ htn- Discussed diabetes physiology\par - Discussed importance of monitoring blood glucose levels\par - Encouraged a low fat/low cholesterol diet\par - Discussed symptoms of hyperglycemia and hypoglycemia\par - Discussed ADA glucose goals\par - Discussed  HGB A1c and the effects of blood glucose on the level\par - Discussed Healthy eating, avoidance of concentrated sweets, and to include vegetables by at least 2 meals a day\par - Discussed regular exercise\par - Discussed importance of follow up physician visits Limit intake of Sodium (Salt) to less than 2 grams a day to prevent fluid retention-swelling or worsening of symptoms. The importance of keeping the blood pressure at or below 130/80 to prevent stroke, heart attacks, kidney failure, blindness, and loss of limbs was  low chol diet. Avoid fried foods, red meat, butter, eggs, hard cheeses. Use canola or olive oil preferred. ::  was established in which goals would be set, monitoring would be done, and problem solving would also be addressed. The patient would be assisted using behavior change techniques, such as self-help and counseling through behavioral modification: Problem solving using hypnosis and positive medical reinforcement to achieve agreed-upon goals.\par low chol diet. Avoid fried foods, red meat, butter, eggs, hard cheeses. Use canola or olive oil preferred.\par \par  - Encouraged a low fat/low cholesterol diet\par  - Discussed Healthy eating, avoidance of concentrated sweets, and to include vegetables by at least 3 meals a day\par  - encouraged low glycemic fruits, grains and vegetables and a diet high in plant protein\par  - Discussed regular exercise\par  - Discussed importance of follow up physician visits\par \par take medications as advised. Rest, ice/heat massage therapy/myofascial  release no heavy lift or twisting avoid prolonged positioning. We reviewed proper lifting mechanics continue with a home stretching program. Return to office in 2 weeks\par \par Total face-to-face time with patient - _15_ minutes; >50% involved counselling, review of labs/tests, and/or coordination of medical care:\par \par \par

## 2023-03-07 NOTE — HEALTH RISK ASSESSMENT
[Fair] :  ~his/her~ mood as fair [No] : No [0] : 2) Feeling down, depressed, or hopeless: Not at all (0) [None] : None [Alone] : lives alone [Employed] : employed [College] : College [Single] : single [Feels Safe at Home] : Feels safe at home [Fully functional (bathing, dressing, toileting, transferring, walking, feeding)] : Fully functional (bathing, dressing, toileting, transferring, walking, feeding) [Fully functional (using the telephone, shopping, preparing meals, housekeeping, doing laundry, using] : Fully functional and needs no help or supervision to perform IADLs (using the telephone, shopping, preparing meals, housekeeping, doing laundry, using transportation, managing medications and managing finances) [Reviewed no changes] : Reviewed, no changes [Reports changes in hearing] : Reports changes in hearing [Reports normal functional visual acuity (ie: able to read med bottle)] : Reports normal functional visual acuity [Smoke Detector] : smoke detector [Carbon Monoxide Detector] : carbon monoxide detector [Safety elements used in home] : safety elements used in home [Seat Belt] :  uses seat belt [Sunscreen] : uses sunscreen [One fall no injury in past year] : Patient reported one fall in the past year without injury [PHQ-2 Negative - No further assessment needed] : PHQ-2 Negative - No further assessment needed [de-identified] : Minimal [de-identified] : Standard [de-identified] : Had a trip and fall last summer was unable to get up on his own after 2 hours was brought to the hospital [Change in mental status noted] : No change in mental status noted [Behavior] : denies difficulty with behavior [Learning/Retaining New Information] : denies difficulty learning/retaining new information [Handling Complex Tasks] : denies difficulty handling complex tasks [Reasoning] : denies difficulty with reasoning [Spatial Ability and Orientation] : denies difficulty with spatial ability and orientation [Sexually Active] : not sexually active [Reports changes in vision] : Reports no changes in vision [Reports changes in dental health] : Reports no changes in dental health [Travel to Developing Areas] : does not  travel to developing areas [Guns at Home] : no guns at home [TB Exposure] : is not being exposed to tuberculosis [Caregiver Concerns] : does not have caregiver concerns [FreeTextEntry2] :

## 2023-03-15 ENCOUNTER — APPOINTMENT (OUTPATIENT)
Dept: INTERNAL MEDICINE | Facility: CLINIC | Age: 83
End: 2023-03-15
Payer: MEDICARE

## 2023-03-15 VITALS
TEMPERATURE: 97.4 F | WEIGHT: 261 LBS | DIASTOLIC BLOOD PRESSURE: 84 MMHG | RESPIRATION RATE: 18 BRPM | HEART RATE: 89 BPM | BODY MASS INDEX: 35.35 KG/M2 | SYSTOLIC BLOOD PRESSURE: 136 MMHG | OXYGEN SATURATION: 97 % | HEIGHT: 72 IN

## 2023-03-15 DIAGNOSIS — H61.20 IMPACTED CERUMEN, UNSPECIFIED EAR: ICD-10-CM

## 2023-03-15 DIAGNOSIS — E11.9 TYPE 2 DIABETES MELLITUS W/OUT COMPLICATIONS: ICD-10-CM

## 2023-03-15 DIAGNOSIS — H61.23 IMPACTED CERUMEN, BILATERAL: ICD-10-CM

## 2023-03-15 DIAGNOSIS — N17.9 ACUTE KIDNEY FAILURE, UNSPECIFIED: ICD-10-CM

## 2023-03-15 DIAGNOSIS — E87.5 HYPERKALEMIA: ICD-10-CM

## 2023-03-15 LAB — GLUCOSE BLDC GLUCOMTR-MCNC: 100

## 2023-03-15 PROCEDURE — 99214 OFFICE O/P EST MOD 30 MIN: CPT | Mod: 25

## 2023-03-15 PROCEDURE — 82962 GLUCOSE BLOOD TEST: CPT

## 2023-03-16 PROBLEM — N17.9 ACUTE KIDNEY INJURY: Status: ACTIVE | Noted: 2023-03-15

## 2023-03-16 PROBLEM — H61.23 BILATERAL IMPACTED CERUMEN: Status: ACTIVE | Noted: 2022-04-20

## 2023-03-16 PROBLEM — E87.5 HYPERKALEMIA: Status: ACTIVE | Noted: 2022-03-06

## 2023-03-16 NOTE — COUNSELING
[Fall prevention counseling provided] : Fall prevention counseling provided [Adequate lighting] : Adequate lighting [No throw rugs] : No throw rugs [Use proper foot wear] : Use proper foot wear [Use recommended devices] : Use recommended devices [Behavioral health counseling provided] : Behavioral health counseling provided [Sleep ___ hours/day] : Sleep [unfilled] hours/day [Engage in a relaxing activity] : Engage in a relaxing activity [Plan in advance] : Plan in advance [de-identified] : Symptomatic patients : Test for influenza, if positive, treat for influenza and do not continue below. \par 1. Fever plus cough or shortness of breath : Test for RVP and COVID-19.\par 2.Indirect, circumstantial or unclear exposure to COVID-19, or other concerning cases not meeting above criteria: Please call [facility] to discuss testing. \par +++ All above cases must be reported to the [facility] registry. +++\par \par Asymptomatic patients: \par 1. Known first-degree direct-contact exposure to positive COVID-19 patient but asymptomatic: No testing PLUS 14 day self-quarantine. Pt to call if symptoms develop. Report to [facility] Registry.\par 2. No known exposure and asymptomatic, referred from outside healthcare organization: Please call [facility] to discuss testing. \par 3.All other asymptomatic patients with no known exposures: no testing, no exceptions.\par \par Bp stable, continue with medications, dash diet, exercise, and dietary management.Continue to check home Bp’s.\par \par - Discussed diabetes physiology\par - Discussed importance of monitoring blood glucose levels\par - Encouraged a low fat/low cholesterol diet\par - Discussed symptoms of hyperglycemia and hypoglycemia\par - Discussed ADA glucose goals\par - Discussed  HGB A1c and the effects of blood glucose on the level\par - Discussed Healthy eating, avoidance of concentrated sweets, and to include vegetables by at least 2 meals a day\par - Discussed regular exercise\par - Discussed importance of follow up physician visits\par \par \par \par 1. Make an appointment with your doctor\par Begin your fall-prevention plan by making an appointment with your doctor. Be prepared to answer questions such as:\par \par What medications are you taking? Make a list of your prescription and over-the-counter medications and supplements, or bring them with you to the appointment. Your doctor can review your medications for side effects and interactions that may increase your risk of falling. To help with fall prevention, your doctor may consider weaning you off medications that make you tired or affect your thinking, such as sedatives and some types of antidepressants.\par Have you fallen before? Write down the details, including when, where and how you fell. Be prepared to discuss instances when you almost fell but were caught by someone or managed to grab hold of something just in time. Details such as these may help your doctor identify specific fall-prevention strategies.\par Could your health conditions cause a fall? Certain eye and ear disorders may increase your risk of falls. Be prepared to discuss your health conditions and how comfortable you are when you walk - for example, do you feel any dizziness, joint pain, shortness of breath, or numbness in your feet and legs when you walk? Your doctor may evaluate your muscle strength, balance and walking style (gait) as well.\par 2. Keep moving\par Physical activity can go a long way toward fall prevention. With your doctor's OK, consider activities such as walking, water workouts or chapito chi - a gentle exercise that involves slow and graceful dance-like movements. Such activities reduce the risk of falls by improving strength, balance, coordination and flexibility.\par \par If you avoid physical activity because you're afraid it will make a fall more likely, tell your doctor. He or she may recommend carefully monitored exercise programs or refer you to a physical therapist. The physical therapist can create a custom exercise program aimed at improving your balance, flexibility, muscle strength and gait.\par \par 3. Wear sensible shoes\par Consider changing your footwear as part of your fall-prevention plan. High heels, floppy slippers and shoes with slick soles can make you slip, stumble and fall. So can walking in your stocking feet. Instead, wear properly fitting, sturdy shoes with nonskid soles. Sensible shoes may also reduce joint pain.\par \par 4. Remove home hazards\par Take a look around your home. Your living room, kitchen, bedroom, bathroom, hallways and stairways may be filled with hazards. To make your home safer:\par \par Remove boxes, newspapers, electrical cords and phone cords from walkways.\par Move coffee tables, magazine racks and plant stands from high-traffic areas.\par Secure loose rugs with double-faced tape, tacks or a slip-resistant backing - or remove loose rugs from your home.\par Repair loose, wooden floorboards and carpeting right away.\par Store clothing, dishes, food and other necessities within easy reach.\par Immediately clean spilled liquids, grease or food.\par Use nonslip mats in your bathtub or shower. Use a bath seat, which allows you to sit while showering.\par 5. Light up your living space\par Keep your home brightly lit to avoid tripping on objects that are hard to see. Also:\par \par Place night lights in your bedroom, bathroom and hallways.\par Place a lamp within reach of your bed for middle-of-the-night needs.\par Make clear paths to light switches that aren't near room entrances. Consider trading traditional switches for glow-in-the-dark or illuminated switches.\par Turn on the lights before going up or down stairs.\par Store flashlights in easy-to-find places in case of power outages.\par 6. Use assistive devices\par Your doctor might recommend using a cane or walker to keep you steady. Other assistive devices can help, too. For example:\par \par Hand rails for both sides of stairways\par Nonslip treads for bare-wood steps\par A raised toilet seat or one with armrests\par Grab bars for the shower or tub\par A sturdy plastic seat for the shower or tub - plus a hand-held shower nozzle for bathing while sitting down\par If necessary, ask your doctor for a referral to an occupational therapist. He or she can help you brainstorm other fall-prevention strategies. Some solutions are easily installed and relatively inexpensive. Others may require professional help or a larger investment. If you're concerned about the cost, remember that an investment in fall prevention is an investment in your independence.\par \par \par diet and exercise weight loss.  Low-salt low-fat ADA diet/ htn- Discussed diabetes physiology\par - Discussed importance of monitoring blood glucose levels\par - Encouraged a low fat/low cholesterol diet\par - Discussed symptoms of hyperglycemia and hypoglycemia\par - Discussed ADA glucose goals\par - Discussed  HGB A1c and the effects of blood glucose on the level\par - Discussed Healthy eating, avoidance of concentrated sweets, and to include vegetables by at least 2 meals a day\par - Discussed regular exercise\par - Discussed importance of follow up physician visits Limit intake of Sodium (Salt) to less than 2 grams a day to prevent fluid retention-swelling or worsening of symptoms. The importance of keeping the blood pressure at or below 130/80 to prevent stroke, heart attacks, kidney failure, blindness, and loss of limbs was  low chol diet. Avoid fried foods, red meat, butter, eggs, hard cheeses. Use canola or olive oil preferred. ::  was established in which goals would be set, monitoring would be done, and problem solving would also be addressed. The patient would be assisted using behavior change techniques, such as self-help and counseling through behavioral modification: Problem solving using hypnosis and positive medical reinforcement to achieve agreed-upon goals.\par Told to avoid NSAIDs keep hydrated.  Total face-to-face time with patient - __15 minutes; >50% involved counselling, review of labs/tests, and/or coordination of medical care:\par \par \par  [None] : None [Good understanding] : Patient has a good understanding of lifestyle changes and steps needed to achieve self management goal

## 2023-03-16 NOTE — END OF VISIT
[FreeTextEntry4] : I Sean Liu, am scribing for and in the presence of Dr. Person, the following sections of:  HISTORY OF PRESENT ILLNESS, PAST MEDICAL/FAMILY/SOCIAL HISTORY, ROS, VITALS, PE, DISPOSITION

## 2023-03-16 NOTE — PHYSICAL EXAM
[No Acute Distress] : no acute distress [Well Nourished] : well nourished [Well Developed] : well developed [Well-Appearing] : well-appearing [Normal Sclera/Conjunctiva] : normal sclera/conjunctiva [PERRL] : pupils equal round and reactive to light [EOMI] : extraocular movements intact [Normal Outer Ear/Nose] : the outer ears and nose were normal in appearance [Normal Oropharynx] : the oropharynx was normal [No JVD] : no jugular venous distention [No Lymphadenopathy] : no lymphadenopathy [Supple] : supple [Thyroid Normal, No Nodules] : the thyroid was normal and there were no nodules present [No Respiratory Distress] : no respiratory distress  [No Accessory Muscle Use] : no accessory muscle use [Clear to Auscultation] : lungs were clear to auscultation bilaterally [Normal Rate] : normal rate  [Regular Rhythm] : with a regular rhythm [Normal S1, S2] : normal S1 and S2 [No Murmur] : no murmur heard [No Carotid Bruits] : no carotid bruits [No Abdominal Bruit] : a ~M bruit was not heard ~T in the abdomen [No Varicosities] : no varicosities [Pedal Pulses Present] : the pedal pulses are present [No Edema] : there was no peripheral edema [No Palpable Aorta] : no palpable aorta [No Extremity Clubbing/Cyanosis] : no extremity clubbing/cyanosis [Soft] : abdomen soft [Non Tender] : non-tender [Non-distended] : non-distended [No Masses] : no abdominal mass palpated [No HSM] : no HSM [Normal Bowel Sounds] : normal bowel sounds [Normal Posterior Cervical Nodes] : no posterior cervical lymphadenopathy [Normal Anterior Cervical Nodes] : no anterior cervical lymphadenopathy [No CVA Tenderness] : no CVA  tenderness [No Spinal Tenderness] : no spinal tenderness [No Joint Swelling] : no joint swelling [Grossly Normal Strength/Tone] : grossly normal strength/tone [No Rash] : no rash [Coordination Grossly Intact] : coordination grossly intact [No Focal Deficits] : no focal deficits [Normal Gait] : normal gait [Deep Tendon Reflexes (DTR)] : deep tendon reflexes were 2+ and symmetric [Normal Affect] : the affect was normal [Normal Insight/Judgement] : insight and judgment were intact [de-identified] : Obese, appears fatigued [de-identified] : Low back pain.

## 2023-03-16 NOTE — HEALTH RISK ASSESSMENT
[Fair] :  ~his/her~ mood as fair [No] : No [One fall no injury in past year] : Patient reported one fall in the past year without injury [0] : 2) Feeling down, depressed, or hopeless: Not at all (0) [None] : None [Alone] : lives alone [Fully functional (bathing, dressing, toileting, transferring, walking, feeding)] : Fully functional (bathing, dressing, toileting, transferring, walking, feeding) [Fully functional (using the telephone, shopping, preparing meals, housekeeping, doing laundry, using] : Fully functional and needs no help or supervision to perform IADLs (using the telephone, shopping, preparing meals, housekeeping, doing laundry, using transportation, managing medications and managing finances) [Reports normal functional visual acuity (ie: able to read med bottle)] : Reports normal functional visual acuity [Reviewed no changes] : Reviewed, no changes [de-identified] : Minimal [de-identified] : Standard [de-identified] : Fell last summer was taken to [facility] [Language] : denies difficulty with language [Behavior] : denies difficulty with behavior [Handling Complex Tasks] : denies difficulty handling complex tasks [Learning/Retaining New Information] : denies difficulty learning/retaining new information [Spatial Ability and Orientation] : denies difficulty with spatial ability and orientation [Reasoning] : denies difficulty with reasoning [Sexually Active] : not sexually active [Reports changes in vision] : Reports no changes in vision

## 2023-03-16 NOTE — HISTORY OF PRESENT ILLNESS
[FreeTextEntry1] :  f/u, GHM, hx of diabetes, obesity, colon ca, lipids, arthritis\par kidney injury- metformin, DMII... Patient was found to have elevated creatinine and potassium= was told to see renal.  Has not yet done so.  Patient was found to have impacted ears with wax we will see ENT this week.  Patient also seen by dermatologist for stasis dermatitis of the lower extremities.  Patient was started on low-dose amlodipine for blood pressure which is stable patient will be off of metformin and Amaryl at this time has been recording very low blood sugars.. [de-identified] : 83 yr old male pt presents for f/u, GHM\par cc: management of kidney failure, diabetes (no longer on metformin) BUN from 3/6 @ 28, Creatinine 1.84\par UTD on Covid Vacc\par \par General health maintenance diabetes, obesity, colon ca, lipids\par Pt is a retired , [employer]\par Denies chest pain, shortness of breath and dizziness.\par Patient has a history of colon cancer had a resection.  Patient had colonoscopy in 2019 which was unremarkable. . \par \par Pt voices no further complaints.\par ROS as documented below.

## 2023-03-22 ENCOUNTER — RX RENEWAL (OUTPATIENT)
Age: 83
End: 2023-03-22

## 2023-04-03 ENCOUNTER — RX RENEWAL (OUTPATIENT)
Age: 83
End: 2023-04-03

## 2023-04-18 ENCOUNTER — RX RENEWAL (OUTPATIENT)
Age: 83
End: 2023-04-18

## 2023-04-18 ENCOUNTER — RX CHANGE (OUTPATIENT)
Age: 83
End: 2023-04-18

## 2023-04-18 RX ORDER — MOMETASONE 50 UG/1
50 SPRAY, METERED NASAL
Qty: 1 | Refills: 0 | Status: ACTIVE | COMMUNITY
Start: 2023-03-06 | End: 1900-01-01

## 2023-04-20 ENCOUNTER — RX RENEWAL (OUTPATIENT)
Age: 83
End: 2023-04-20

## 2023-06-05 ENCOUNTER — RX RENEWAL (OUTPATIENT)
Age: 83
End: 2023-06-05

## 2023-07-16 ENCOUNTER — RX RENEWAL (OUTPATIENT)
Age: 83
End: 2023-07-16

## 2024-01-27 ENCOUNTER — RX RENEWAL (OUTPATIENT)
Age: 84
End: 2024-01-27

## 2024-01-27 RX ORDER — OMEPRAZOLE 40 MG/1
40 CAPSULE, DELAYED RELEASE ORAL
Qty: 90 | Refills: 1 | Status: ACTIVE | COMMUNITY
Start: 2021-07-13 | End: 1900-01-01

## 2024-03-01 ENCOUNTER — APPOINTMENT (OUTPATIENT)
Dept: INTERNAL MEDICINE | Facility: CLINIC | Age: 84
End: 2024-03-01
Payer: MEDICARE

## 2024-03-01 VITALS
HEART RATE: 57 BPM | BODY MASS INDEX: 32.23 KG/M2 | TEMPERATURE: 97.6 F | RESPIRATION RATE: 17 BRPM | SYSTOLIC BLOOD PRESSURE: 110 MMHG | HEIGHT: 72 IN | WEIGHT: 238 LBS | DIASTOLIC BLOOD PRESSURE: 80 MMHG | OXYGEN SATURATION: 98 %

## 2024-03-01 DIAGNOSIS — H81.10 BENIGN PAROXYSMAL VERTIGO, UNSPECIFIED EAR: ICD-10-CM

## 2024-03-01 DIAGNOSIS — I50.9 HEART FAILURE, UNSPECIFIED: ICD-10-CM

## 2024-03-01 DIAGNOSIS — E78.5 HYPERLIPIDEMIA, UNSPECIFIED: ICD-10-CM

## 2024-03-01 DIAGNOSIS — R94.31 ABNORMAL ELECTROCARDIOGRAM [ECG] [EKG]: ICD-10-CM

## 2024-03-01 DIAGNOSIS — I10 ESSENTIAL (PRIMARY) HYPERTENSION: ICD-10-CM

## 2024-03-01 DIAGNOSIS — Z86.39 PERSONAL HISTORY OF OTHER ENDOCRINE, NUTRITIONAL AND METABOLIC DISEASE: ICD-10-CM

## 2024-03-01 DIAGNOSIS — K21.9 GASTRO-ESOPHAGEAL REFLUX DISEASE W/OUT ESOPHAGITIS: ICD-10-CM

## 2024-03-01 DIAGNOSIS — N18.9 CHRONIC KIDNEY DISEASE, UNSPECIFIED: ICD-10-CM

## 2024-03-01 DIAGNOSIS — I48.91 UNSPECIFIED ATRIAL FIBRILLATION: ICD-10-CM

## 2024-03-01 PROCEDURE — G2211 COMPLEX E/M VISIT ADD ON: CPT

## 2024-03-01 PROCEDURE — 99215 OFFICE O/P EST HI 40 MIN: CPT

## 2024-03-01 RX ORDER — AMLODIPINE BESYLATE 2.5 MG/1
2.5 TABLET ORAL
Qty: 90 | Refills: 0 | Status: DISCONTINUED | COMMUNITY
Start: 2023-06-05 | End: 2024-03-01

## 2024-03-01 RX ORDER — GLIMEPIRIDE 2 MG/1
2 TABLET ORAL
Qty: 90 | Refills: 0 | Status: DISCONTINUED | COMMUNITY
Start: 2023-03-07 | End: 2024-03-01

## 2024-03-01 RX ORDER — AMLODIPINE BESYLATE 2.5 MG/1
2.5 TABLET ORAL
Qty: 90 | Refills: 0 | Status: DISCONTINUED | COMMUNITY
Start: 2023-03-07 | End: 2024-03-01

## 2024-03-01 RX ORDER — ASPIRIN 81 MG/1
81 TABLET, COATED ORAL
Qty: 90 | Refills: 2 | Status: ACTIVE | COMMUNITY
Start: 2024-03-01 | End: 1900-01-01

## 2024-03-03 PROBLEM — R94.31 ABNORMAL EKG: Status: ACTIVE | Noted: 2021-08-11

## 2024-03-03 PROBLEM — I48.91 ATRIAL FIBRILLATION, UNSPECIFIED TYPE: Status: ACTIVE | Noted: 2024-03-03

## 2024-03-03 PROBLEM — I10 HTN (HYPERTENSION): Status: ACTIVE | Noted: 2022-05-03

## 2024-03-03 PROBLEM — I50.9 OTHER CONGESTIVE HEART FAILURE: Status: ACTIVE | Noted: 2024-03-01

## 2024-03-03 PROBLEM — E78.5 HYPERLIPIDEMIA: Status: ACTIVE | Noted: 2021-08-11

## 2024-03-03 PROBLEM — K21.9 GERD (GASTROESOPHAGEAL REFLUX DISEASE): Status: ACTIVE | Noted: 2021-07-13

## 2024-03-03 PROBLEM — N18.9 CKD (CHRONIC KIDNEY DISEASE): Status: ACTIVE | Noted: 2023-03-15

## 2024-03-03 PROBLEM — Z86.39 HISTORY OF OBESITY: Status: RESOLVED | Noted: 2021-08-11 | Resolved: 2024-03-03

## 2024-03-03 PROBLEM — H81.10 BENIGN POSITIONAL VERTIGO: Status: ACTIVE | Noted: 2022-04-20

## 2024-03-03 NOTE — COUNSELING
[Fall prevention counseling provided] : Fall prevention counseling provided [Adequate lighting] : Adequate lighting [No throw rugs] : No throw rugs [Use proper foot wear] : Use proper foot wear [Use recommended devices] : Use recommended devices [Behavioral health counseling provided] : Behavioral health counseling provided [Sleep ___ hours/day] : Sleep [unfilled] hours/day [Engage in a relaxing activity] : Engage in a relaxing activity [Plan in advance] : Plan in advance [None] : None [Good understanding] : Patient has a good understanding of lifestyle changes and steps needed to achieve self management goal [Potential consequences of obesity discussed] : Potential consequences of obesity discussed [Benefits of weight loss discussed] : Benefits of weight loss discussed [Structured Weight Management Program suggested:] : Structured weight management program suggested [Encouraged to increase physical activity] : Encouraged to increase physical activity [Encouraged to maintain food diary] : Encouraged to maintain food diary [Encouraged to use exercise tracking device] : Encouraged to use exercise tracking device [Target Wt Loss Goal ___] : Weight Loss Goals: Target weight loss goal [unfilled] lbs [Weigh Self Weekly] : weigh self weekly [Decrease Portions] : decrease portions [____ min/wk Activity] : [unfilled] min/wk activity [de-identified] : Total face-to-face time with patient -  minutes; >50% involved counselling, review of labs/tests, and/or coordination of medical care: diet and exercise weight loss.  Low-salt low-fat ADA diet/ htn- Discussed diabetes physiology - Discussed importance of monitoring blood glucose levels - Encouraged a low fat/low cholesterol diet - Discussed symptoms of hyperglycemia and hypoglycemia - Discussed ADA glucose goals - Discussed  HGB A1c and the effects of blood glucose on the level - Discussed Healthy eating, avoidance of concentrated sweets, and to include vegetables by at least 2 meals a day - Discussed regular exercise - Discussed importance of follow up physician visits Limit intake of Sodium (Salt) to less than 2 grams a day to prevent fluid retention-swelling or worsening of symptoms. The importance of keeping the blood pressure at or below 130/80 to prevent stroke, heart attacks, kidney failure, blindness, and loss of limbs was  low chol diet. Avoid fried foods, red meat, butter, eggs, hard cheeses. Use canola or olive oil preferred. ::  was established in which goals would be set, monitoring would be done, and problem solving would also be addressed. The patient would be assisted using behavior change techniques, such as self-help and counseling through behavioral modification: Problem solving using hypnosis and positive medical reinforcement to achieve agreed-upon goals. Bp stable, continue with medications, dash diet, exercise, and dietary management.Continue to check home Bps.  - Discussed diabetes physiology - Discussed importance of monitoring blood glucose levels - Encouraged a low fat/low cholesterol diet - Discussed symptoms of hyperglycemia and hypoglycemia - Discussed ADA glucose goals - Discussed  HGB A1c and the effects of blood glucose on the level - Discussed Healthy eating, avoidance of concentrated sweets, and to include vegetables by at least 2 meals a day - Discussed regular exercise - Discussed importance of follow up physician visits low chol diet. Avoid fried foods, red meat, butter, eggs, hard cheeses. Use canola or olive oil preferred.   - Encouraged a low fat/low cholesterol diet  - Discussed Healthy eating, avoidance of concentrated sweets, and to include vegetables by at least 3 meals a day  - encouraged low glycemic fruits, grains and vegetables and a diet high in plant protein  - Discussed regular exercise  - Discussed importance of follow up physician visits Symptomatic patients : Test for influenza, if positive, treat for influenza and do not continue below.  1. Fever plus cough or shortness of breath : Test for RVP and COVID-19. 2.Indirect, circumstantial or unclear exposure to COVID-19, or other concerning cases not meeting above criteria: Please call [facility] to discuss testing.  +++ All above cases must be reported to the [facility] registry. +++  Asymptomatic patients:  1. Known first-degree direct-contact exposure to positive COVID-19 patient but asymptomatic: No testing PLUS 14 day self-quarantine. Pt to call if symptoms develop. Report to [facility] Registry. 2. No known exposure and asymptomatic, referred from outside healthcare organization: Please call [facility] to discuss testing.  3.All other asymptomatic patients with no known exposures: no testing, no exceptions.

## 2024-03-03 NOTE — HISTORY OF PRESENT ILLNESS
[FreeTextEntry1] : Post Hospitalization.... Patient was hospitalized at [facility]   developed shortness of breath he was found to have atrial fibrillation and congestive heart failure.  Patient denies fever chills cough chest pain or shortness of breath at this time [de-identified] : 84 yr old male pt presents for f/u, GHM cc: s/p [facility], Dx CHF, Started on Eliquis 2.5, Aspirin 81, Diltiazem 30, Lasix 20, Metoprolol 50, Spironolactone 12.5 Cardiologist:  Hx of kidney failure, diabetes (no longer metformin)  UTD on Covid Vacc, denies having had an MI he has a history of obesity hypertension hyperlipidemia and diabetes mellitus Patient is status post hospitalization for atrial fibrillation and congestive heart failure, patient General health maintenance diabetes, obesity, colon ca, lipids Pt is a retired , [employer] Denies chest pain, shortness of breath and dizziness. Patient has a history of colon cancer had a resection.  Patient had colonoscopy in 2019 which was unremarkable. .   Pt voices no further complaints. ROS as documented below.

## 2024-03-03 NOTE — HEALTH RISK ASSESSMENT
[Good] : ~his/her~  mood as  good [Fair] : ~his/her~ current health as fair  [No] : No [No falls in past year] : Patient reported no falls in the past year [0] : 2) Feeling down, depressed, or hopeless: Not at all (0) [PHQ-2 Negative - No further assessment needed] : PHQ-2 Negative - No further assessment needed [None] : None [Alone] : lives alone [Single] : single [Feels Safe at Home] : Feels safe at home [Fully functional (bathing, dressing, toileting, transferring, walking, feeding)] : Fully functional (bathing, dressing, toileting, transferring, walking, feeding) [Fully functional (using the telephone, shopping, preparing meals, housekeeping, doing laundry, using] : Fully functional and needs no help or supervision to perform IADLs (using the telephone, shopping, preparing meals, housekeeping, doing laundry, using transportation, managing medications and managing finances) [Reports normal functional visual acuity (ie: able to read med bottle)] : Reports normal functional visual acuity [Safety elements used in home] : safety elements used in home [Smoke Detector] : smoke detector [Carbon Monoxide Detector] : carbon monoxide detector [Seat Belt] :  uses seat belt [Sunscreen] : uses sunscreen [Reviewed no changes] : Reviewed, no changes [Never] : Never [I will adhere to the patient's wishes.] : I will adhere to the patient's wishes. [de-identified] : minimal [Change in mental status noted] : No change in mental status noted [de-identified] : Standard [Behavior] : denies difficulty with behavior [Language] : denies difficulty with language [Reasoning] : denies difficulty with reasoning [Learning/Retaining New Information] : denies difficulty learning/retaining new information [Sexually Active] : not sexually active [Reports changes in dental health] : Reports no changes in dental health [Reports changes in hearing] : Reports no changes in hearing [Travel to Developing Areas] : does not  travel to developing areas [Caregiver Concerns] : does not have caregiver concerns [FreeTextEntry2] :  domo Angel

## 2024-03-03 NOTE — ASSESSMENT
[FreeTextEntry1] : This 84-year-old male was hospitalized when he became short of breath was found to have CHF and atrial fibrillation while in the hospital.  Patient is presently under the care of a cardiologist will continue to use metoprolol Lasix and Eliquis.  Patient has no shortness of breath or chest pain at this time all hospital records medications testing.  Was reviewed

## 2024-03-03 NOTE — PHYSICAL EXAM
[No Acute Distress] : no acute distress [Well Nourished] : well nourished [Well Developed] : well developed [Well-Appearing] : well-appearing [Normal Sclera/Conjunctiva] : normal sclera/conjunctiva [PERRL] : pupils equal round and reactive to light [EOMI] : extraocular movements intact [Normal Outer Ear/Nose] : the outer ears and nose were normal in appearance [Normal Oropharynx] : the oropharynx was normal [No JVD] : no jugular venous distention [No Lymphadenopathy] : no lymphadenopathy [Supple] : supple [Thyroid Normal, No Nodules] : the thyroid was normal and there were no nodules present [No Respiratory Distress] : no respiratory distress  [No Accessory Muscle Use] : no accessory muscle use [Clear to Auscultation] : lungs were clear to auscultation bilaterally [Normal Rate] : normal rate  [Regular Rhythm] : with a regular rhythm [Normal S1, S2] : normal S1 and S2 [No Murmur] : no murmur heard [No Carotid Bruits] : no carotid bruits [No Abdominal Bruit] : a ~M bruit was not heard ~T in the abdomen [No Varicosities] : no varicosities [Pedal Pulses Present] : the pedal pulses are present [No Edema] : there was no peripheral edema [No Palpable Aorta] : no palpable aorta [No Extremity Clubbing/Cyanosis] : no extremity clubbing/cyanosis [Soft] : abdomen soft [Non Tender] : non-tender [Non-distended] : non-distended [No Masses] : no abdominal mass palpated [No HSM] : no HSM [Normal Bowel Sounds] : normal bowel sounds [Normal Posterior Cervical Nodes] : no posterior cervical lymphadenopathy [Normal Anterior Cervical Nodes] : no anterior cervical lymphadenopathy [No CVA Tenderness] : no CVA  tenderness [No Spinal Tenderness] : no spinal tenderness [No Joint Swelling] : no joint swelling [Grossly Normal Strength/Tone] : grossly normal strength/tone [No Rash] : no rash [Coordination Grossly Intact] : coordination grossly intact [No Focal Deficits] : no focal deficits [Normal Gait] : normal gait [Deep Tendon Reflexes (DTR)] : deep tendon reflexes were 2+ and symmetric [Normal Affect] : the affect was normal [Normal Insight/Judgement] : insight and judgment were intact [Normal Appearance] : normal in appearance [Declined Rectal Exam] : declined rectal exam [Normal] : affect was normal and insight and judgment were intact [de-identified] : Irregularly irregular [de-identified] : No flank pain

## 2024-03-23 RX ORDER — BLOOD SUGAR DIAGNOSTIC
STRIP MISCELLANEOUS
Qty: 100 | Refills: 3 | Status: ACTIVE | COMMUNITY
Start: 2022-02-04 | End: 1900-01-01

## 2024-04-01 ENCOUNTER — RX CHANGE (OUTPATIENT)
Age: 84
End: 2024-04-01

## 2024-04-01 ENCOUNTER — NON-APPOINTMENT (OUTPATIENT)
Age: 84
End: 2024-04-01

## 2024-04-01 RX ORDER — COLISTIN SULFATE, NEOMYCIN SULFATE, THONZONIUM BROMIDE AND HYDROCORTISONE ACETATE 3; 3.3; .5; 1 MG/ML; MG/ML; MG/ML; MG/ML
3.3-3-10-0.5 SUSPENSION AURICULAR (OTIC)
Qty: 10 | Refills: 1 | Status: ACTIVE | COMMUNITY
Start: 2024-04-01 | End: 1900-01-01

## 2024-04-01 RX ORDER — COLISTIN SULFATE, NEOMYCIN SULFATE, THONZONIUM BROMIDE AND HYDROCORTISONE ACETATE 3; 3.3; .5; 1 MG/ML; MG/ML; MG/ML; MG/ML
3.3-3-10-0.5 SUSPENSION AURICULAR (OTIC)
Qty: 10 | Refills: 1 | Status: DISCONTINUED | COMMUNITY
Start: 2023-03-07 | End: 2024-04-01

## 2024-05-20 ENCOUNTER — RX RENEWAL (OUTPATIENT)
Age: 84
End: 2024-05-20

## 2024-05-20 RX ORDER — DILTIAZEM HYDROCHLORIDE 30 MG/1
30 TABLET ORAL
Qty: 270 | Refills: 3 | Status: ACTIVE | COMMUNITY
Start: 2024-03-01 | End: 1900-01-01

## 2024-06-05 RX ORDER — APIXABAN 2.5 MG/1
2.5 TABLET, FILM COATED ORAL
Qty: 180 | Refills: 3 | Status: ACTIVE | COMMUNITY
Start: 2024-03-01 | End: 1900-01-01

## 2024-06-05 RX ORDER — SPIRONOLACTONE 25 MG/1
25 TABLET ORAL
Qty: 45 | Refills: 3 | Status: ACTIVE | COMMUNITY
Start: 2024-03-01 | End: 1900-01-01

## 2024-06-05 RX ORDER — METOPROLOL TARTRATE 50 MG/1
50 TABLET, FILM COATED ORAL
Qty: 180 | Refills: 3 | Status: ACTIVE | COMMUNITY
Start: 2024-03-01 | End: 1900-01-01

## 2024-06-05 RX ORDER — FUROSEMIDE 20 MG/1
20 TABLET ORAL
Qty: 90 | Refills: 3 | Status: ACTIVE | COMMUNITY
Start: 2024-03-01 | End: 1900-01-01

## 2024-07-22 NOTE — DATA REVIEWED
[FreeTextEntry1] : Echo: 6/26/24 LVEF 62%, HAILEE 0.91sqcm, pGr 63 mmHg, AoV 3.98 m/s, mild MR, mild TR

## 2024-07-22 NOTE — HISTORY OF PRESENT ILLNESS
[FreeTextEntry1] : 84 year old male followed and referred by Dr. Jamie Dejesus for evaluation of aortic stenosis. To review, PMH includes HLD, HTN and DM.  Recently admitted to [facility] for CHF noted to be in Afib with RVR. TTE 6/26 showed calcified trileaflet AV PVG 63.3, HAILEE 0.9cm2

## 2024-07-23 ENCOUNTER — APPOINTMENT (OUTPATIENT)
Dept: CARDIOTHORACIC SURGERY | Facility: CLINIC | Age: 84
End: 2024-07-23

## 2024-07-23 ENCOUNTER — APPOINTMENT (OUTPATIENT)
Dept: CARDIOTHORACIC SURGERY | Facility: CLINIC | Age: 84
End: 2024-07-23
Payer: MEDICARE

## 2024-07-23 ENCOUNTER — NON-APPOINTMENT (OUTPATIENT)
Age: 84
End: 2024-07-23

## 2024-07-23 VITALS
RESPIRATION RATE: 18 BRPM | HEIGHT: 72 IN | OXYGEN SATURATION: 98 % | BODY MASS INDEX: 32.51 KG/M2 | HEART RATE: 38 BPM | WEIGHT: 240 LBS | SYSTOLIC BLOOD PRESSURE: 153 MMHG | DIASTOLIC BLOOD PRESSURE: 90 MMHG

## 2024-07-23 DIAGNOSIS — I35.0 NONRHEUMATIC AORTIC (VALVE) STENOSIS: ICD-10-CM

## 2024-07-23 LAB
ANION GAP SERPL CALC-SCNC: 15 MMOL/L
BASOPHILS # BLD AUTO: 0.03 K/UL
BASOPHILS NFR BLD AUTO: 0.3 %
BUN SERPL-MCNC: 26 MG/DL
CALCIUM SERPL-MCNC: 9.4 MG/DL
CHLORIDE SERPL-SCNC: 104 MMOL/L
CO2 SERPL-SCNC: 22 MMOL/L
CREAT SERPL-MCNC: 1.53 MG/DL
EGFR: 45 ML/MIN/1.73M2
EOSINOPHIL # BLD AUTO: 0.46 K/UL
EOSINOPHIL NFR BLD AUTO: 5.2 %
GLUCOSE SERPL-MCNC: 99 MG/DL
HCT VFR BLD CALC: 41.2 %
HGB BLD-MCNC: 13.4 G/DL
IMM GRANULOCYTES NFR BLD AUTO: 0.3 %
LYMPHOCYTES # BLD AUTO: 2.01 K/UL
LYMPHOCYTES NFR BLD AUTO: 22.8 %
MAN DIFF?: NORMAL
MCHC RBC-ENTMCNC: 31.9 PG
MCHC RBC-ENTMCNC: 32.5 GM/DL
MCV RBC AUTO: 98.1 FL
MONOCYTES # BLD AUTO: 1.15 K/UL
MONOCYTES NFR BLD AUTO: 13 %
NEUTROPHILS # BLD AUTO: 5.14 K/UL
NEUTROPHILS NFR BLD AUTO: 58.4 %
NT-PROBNP SERPL-MCNC: 1643 PG/ML
PLATELET # BLD AUTO: 240 K/UL
POTASSIUM SERPL-SCNC: 4.5 MMOL/L
RBC # BLD: 4.2 M/UL
RBC # FLD: 13.3 %
SODIUM SERPL-SCNC: 141 MMOL/L
WBC # FLD AUTO: 8.82 K/UL

## 2024-07-23 PROCEDURE — 99205 OFFICE O/P NEW HI 60 MIN: CPT

## 2024-07-23 PROCEDURE — 99204 OFFICE O/P NEW MOD 45 MIN: CPT

## 2024-07-23 PROCEDURE — 93000 ELECTROCARDIOGRAM COMPLETE: CPT

## 2024-07-23 RX ORDER — ATORVASTATIN CALCIUM 10 MG/1
10 TABLET, FILM COATED ORAL DAILY
Refills: 0 | Status: ACTIVE | COMMUNITY

## 2024-07-23 NOTE — CARDIOLOGY SUMMARY
[de-identified] : SB 54 with PVC and a RBBB [de-identified] : 6/26/24 LVEF 62%, HAILEE 0.91sqcm, pGr 63 mmHg, AoV 3.98 m/s, mild MR, mild TR

## 2024-07-23 NOTE — REASON FOR VISIT
[Structural Heart and Valve Disease] : structural heart and valve disease [Other: ____] : [unfilled] [FreeTextEntry3] : Dr. Dejesus

## 2024-07-23 NOTE — HISTORY OF PRESENT ILLNESS
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 Dejesus for evaluation of aortic stenosis. His past medical history includes FOX/CKD (Followed by Dr. Deniz Ni at [facility] nephrology), T2DM, AFib on Eliquis, HTN, HLD, and arthritis. He had colon cancer remotely (26 years ago) treated with colon resection and chemotherapy. He was hospitalized at [facility] on February 21 for acute CHF exacerbation in the setting of AFib with RVR.  He reports this was his first hospitalization for CHF he was diuresed, HR controlled, and he was started on Eliquis for new onset AFib. His activity is limited by spinal stenosis and neuropathy. 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 He is more easily fatigued and becomes short of breath with mild to moderate levels of exertion (NYHA II). He has no angina, PND, orthopnea, dizziness, or edema.   I have reviewed his outpatient echocardiogram with Dr. Lino  He is accompanied today by his niece, and he reports feeling "fair, low energy, sometimes shortness of breath" and was hospitalized at [facility] in February for CHF and "a rapid heartbeat or something" and was admitted for 5 days. He just established cardiac care with Dr Dejesus last month and was previously unaware of having AS. He has no angina. He reports no history of CAD, CVA, MI, strents, or prior cardiac surgery. He becomes winded "lately with anything that requires exertion, I have to stop and sit for awhile" and he notes his functional capacity has significantly decline. 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 We don't have recent labs and he does not know his renal function. He notes "I tend to run high when in medical environments" with respect to his BP.

## 2024-07-23 NOTE — PHYSICAL EXAM
[Well Developed] : well developed [No Acute Distress] : no acute distress [Bradycardia] : bradycardic [Irregularly Irregular] : irregularly irregular [II] : a grade 2 [___ +] : bilateral [unfilled]U+ pitting edema to the ankles [Clear Lung Fields] : clear lung fields [Soft] : abdomen soft [Non Tender] : non-tender [Normal Gait] : normal gait [No Edema] : no edema [Normal] : alert and oriented, normal memory [de-identified] : ambulates with a cane

## 2024-07-23 NOTE — PHYSICAL EXAM
[Well Developed] : well developed [No Acute Distress] : no acute distress [Bradycardia] : bradycardic [Irregularly Irregular] : irregularly irregular [II] : a grade 2 [___ +] : bilateral [unfilled]U+ pitting edema to the ankles [Clear Lung Fields] : clear lung fields [Soft] : abdomen soft [Non Tender] : non-tender [Normal Gait] : normal gait [No Edema] : no edema [Normal] : alert and oriented, normal memory [de-identified] : ambulates with a cane

## 2024-07-23 NOTE — DISCUSSION/SUMMARY
[FreeTextEntry1] : Father EDINSON: Symptomatic severe AS. Labs today. Schedule CT (and a repeat TTE here the same day), cardiac catheterization, and give him a tentative date for TAVR. 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 I mentioned this is sometimes done upstream of TAVR (if their is a Class I indication, I will discuss this with Dr Dejesus) or more commonly post TAVR if deemed necessary. He recently wore a 7-day MCT monitor and is awaiting the results=Dr Dejesus aware and will follow up with him in this regard.

## 2024-07-23 NOTE — CARDIOLOGY SUMMARY
[de-identified] : SB 54 with PVC and a RBBB [de-identified] : 6/26/24 LVEF 62%, HAILEE 0.91sqcm, pGr 63 mmHg, AoV 3.98 m/s, mild MR, mild TR

## 2024-07-23 NOTE — REVIEW OF SYSTEMS
[Feeling Fatigued] : feeling fatigued [Dyspnea on exertion] : dyspnea during exertion [Joint Pain] : joint pain [Negative] : Heme/Lymph [Chest Discomfort] : no chest discomfort [Lower Ext Edema] : no extremity edema [Palpitations] : no palpitations [Orthopnea] : no orthopnea [PND] : no PND [Abdominal Pain] : no abdominal pain [Change in Appetite] : no change in appetite [Dizziness] : no dizziness [FreeTextEntry3] : eyeglasses

## 2024-07-23 NOTE — HISTORY OF PRESENT ILLNESS
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 Dejesus for evaluation of aortic stenosis. His past medical history includes FOX/CKD (Followed by Dr. Deniz Ni at [facility] nephrology), T2DM, AFib on Eliquis, HTN, HLD, and arthritis. He had colon cancer remotely (26 years ago) treated with colon resection and chemotherapy. He was hospitalized at [facility] on February 21 for acute CHF exacerbation in the setting of AFib with RVR.  He reports this was his first hospitalization for CHF he was diuresed, HR controlled, and he was started on Eliquis for new onset AFib. His activity is limited by spinal stenosis and neuropathy. 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 He is more easily fatigued and becomes short of breath with mild to moderate levels of exertion (NYHA II). He has no angina, PND, orthopnea, dizziness, or edema.   I have reviewed his outpatient echocardiogram with Dr. Lino  He is accompanied today by his niece, and he reports feeling "fair, low energy, sometimes shortness of breath" and was hospitalized at [facility] in February for CHF and "a rapid heartbeat or something" and was admitted for 5 days. He just established cardiac care with Dr Dejesus last month and was previously unaware of having AS. He has no angina. He reports no history of CAD, CVA, MI, strents, or prior cardiac surgery. He becomes winded "lately with anything that requires exertion, I have to stop and sit for awhile" and he notes his functional capacity has significantly decline. 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 We don't have recent labs and he does not know his renal function. He notes "I tend to run high when in medical environments" with respect to his BP.

## 2024-07-26 NOTE — HISTORY OF PRESENT ILLNESS
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 Dejesus for evaluation of aortic stenosis. His past medical history includes FOX/CKD (Followed by Dr. Deniz Ni at [facility] nephrology), T2DM, AFib on Eliquis, HTN, HLD, and arthritis. He had colon cancer remotely (26 years ago) treated with colon resection and chemotherapy. He was hospitalized at [facility] on February 21 for acute CHF exacerbation in the setting of AFib with RVR. He reports this was his first hospitalization for CHF he was diuresed, HR controlled, and he was started on Eliquis for new onset AFib. His activity is limited by spinal stenosis and neuropathy. 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 He is more easily fatigued and becomes short of breath with mild to moderate levels of exertion (NYHA II). He has no angina, PND, orthopnea, dizziness, or edema.  TTE 6/26 showed calcified trileaflet AV PVG 63.3, HAILEE 0.9cm2

## 2024-07-26 NOTE — PHYSICAL EXAM
[Well Developed] : well developed [No Acute Distress] : no acute distress [Bradycardia] : bradycardic [Irregularly Irregular] : irregularly irregular [II] : a grade 2 [___ +] : bilateral [unfilled]U+ pitting edema to the ankles [Clear Lung Fields] : clear lung fields [Soft] : abdomen soft [Non Tender] : non-tender [Normal Gait] : normal gait [No Edema] : no edema [Normal] : alert and oriented, normal memory [de-identified] : ambulates with a cane

## 2024-07-26 NOTE — ASSESSMENT
[FreeTextEntry1] : Mr. Rodriguez has symptomatic severe aortic stenosis. He will have labs drawn today (CBC, CMP, pBNP) in anticipation of CT and cardiac catheterization. He has a history of tachy-chelsea syndrome and a baseline RBBB on EKG putting him at higher risk for conduction disturbance and the need for post TAVR PPM. He will be given a date for TAVR.

## 2024-08-12 ENCOUNTER — RX RENEWAL (OUTPATIENT)
Age: 84
End: 2024-08-12

## 2024-08-18 PROBLEM — R94.31 ABNORMAL ECG: Status: ACTIVE | Noted: 2024-08-18

## 2024-08-18 PROBLEM — I35.0 AORTIC STENOSIS: Status: ACTIVE | Noted: 2024-08-18

## 2024-08-18 PROBLEM — I45.4 BUNDLE-BRANCH BLOCK: Status: ACTIVE | Noted: 2024-08-18

## 2024-08-18 PROBLEM — R06.00 DYSPNEA: Status: ACTIVE | Noted: 2024-08-18

## 2024-08-18 PROBLEM — I48.0 PAROXYSMAL ATRIAL FIBRILLATION: Status: ACTIVE | Noted: 2024-08-18

## 2024-08-18 PROBLEM — I10 HYPERTENSION: Status: ACTIVE | Noted: 2024-08-18

## 2024-09-03 ENCOUNTER — NON-APPOINTMENT (OUTPATIENT)
Age: 84
End: 2024-09-03

## 2024-09-09 ENCOUNTER — TRANSCRIPTION ENCOUNTER (OUTPATIENT)
Age: 84
End: 2024-09-09

## 2024-09-09 ENCOUNTER — OUTPATIENT (OUTPATIENT)
Dept: OUTPATIENT SERVICES | Facility: HOSPITAL | Age: 84
LOS: 1 days | End: 2024-09-09
Payer: MEDICARE

## 2024-09-09 VITALS
TEMPERATURE: 98 F | SYSTOLIC BLOOD PRESSURE: 186 MMHG | RESPIRATION RATE: 18 BRPM | DIASTOLIC BLOOD PRESSURE: 84 MMHG | OXYGEN SATURATION: 98 % | HEART RATE: 91 BPM

## 2024-09-09 VITALS
RESPIRATION RATE: 19 BRPM | HEART RATE: 58 BPM | SYSTOLIC BLOOD PRESSURE: 139 MMHG | DIASTOLIC BLOOD PRESSURE: 65 MMHG | OXYGEN SATURATION: 97 %

## 2024-09-09 DIAGNOSIS — Z92.21 PERSONAL HISTORY OF ANTINEOPLASTIC CHEMOTHERAPY: Chronic | ICD-10-CM

## 2024-09-09 DIAGNOSIS — I35.0 NONRHEUMATIC AORTIC (VALVE) STENOSIS: ICD-10-CM

## 2024-09-09 DIAGNOSIS — Z90.49 ACQUIRED ABSENCE OF OTHER SPECIFIED PARTS OF DIGESTIVE TRACT: Chronic | ICD-10-CM

## 2024-09-09 LAB
ANION GAP SERPL CALC-SCNC: 14 MMOL/L — SIGNIFICANT CHANGE UP (ref 5–17)
BUN SERPL-MCNC: 34 MG/DL — HIGH (ref 7–23)
CALCIUM SERPL-MCNC: 9.8 MG/DL — SIGNIFICANT CHANGE UP (ref 8.4–10.5)
CHLORIDE SERPL-SCNC: 103 MMOL/L — SIGNIFICANT CHANGE UP (ref 96–108)
CO2 SERPL-SCNC: 25 MMOL/L — SIGNIFICANT CHANGE UP (ref 22–31)
CREAT SERPL-MCNC: 1.66 MG/DL — HIGH (ref 0.5–1.3)
EGFR: 41 ML/MIN/1.73M2 — LOW
GLUCOSE BLDC GLUCOMTR-MCNC: 135 MG/DL — HIGH (ref 70–99)
GLUCOSE BLDC GLUCOMTR-MCNC: 139 MG/DL — HIGH (ref 70–99)
GLUCOSE SERPL-MCNC: 159 MG/DL — HIGH (ref 70–99)
HCT VFR BLD CALC: 44.1 % — SIGNIFICANT CHANGE UP (ref 39–50)
HGB BLD-MCNC: 14.5 G/DL — SIGNIFICANT CHANGE UP (ref 13–17)
MCHC RBC-ENTMCNC: 31.3 PG — SIGNIFICANT CHANGE UP (ref 27–34)
MCHC RBC-ENTMCNC: 32.9 GM/DL — SIGNIFICANT CHANGE UP (ref 32–36)
MCV RBC AUTO: 95 FL — SIGNIFICANT CHANGE UP (ref 80–100)
NRBC # BLD: 0 /100 WBCS — SIGNIFICANT CHANGE UP (ref 0–0)
PLATELET # BLD AUTO: 256 K/UL — SIGNIFICANT CHANGE UP (ref 150–400)
POTASSIUM SERPL-MCNC: 4.3 MMOL/L — SIGNIFICANT CHANGE UP (ref 3.5–5.3)
POTASSIUM SERPL-SCNC: 4.3 MMOL/L — SIGNIFICANT CHANGE UP (ref 3.5–5.3)
RBC # BLD: 4.64 M/UL — SIGNIFICANT CHANGE UP (ref 4.2–5.8)
RBC # FLD: 12.1 % — SIGNIFICANT CHANGE UP (ref 10.3–14.5)
SODIUM SERPL-SCNC: 142 MMOL/L — SIGNIFICANT CHANGE UP (ref 135–145)
WBC # BLD: 8.6 K/UL — SIGNIFICANT CHANGE UP (ref 3.8–10.5)
WBC # FLD AUTO: 8.6 K/UL — SIGNIFICANT CHANGE UP (ref 3.8–10.5)

## 2024-09-09 PROCEDURE — 99152 MOD SED SAME PHYS/QHP 5/>YRS: CPT

## 2024-09-09 PROCEDURE — 93010 ELECTROCARDIOGRAM REPORT: CPT | Mod: 77

## 2024-09-09 PROCEDURE — 92928 PRQ TCAT PLMT NTRAC ST 1 LES: CPT | Mod: LD

## 2024-09-09 PROCEDURE — 93454 CORONARY ARTERY ANGIO S&I: CPT | Mod: 26,59

## 2024-09-09 PROCEDURE — 93010 ELECTROCARDIOGRAM REPORT: CPT

## 2024-09-09 RX ORDER — DILTIAZEM HYDROCHLORIDE 5 MG/ML
30 INJECTION INTRAVENOUS ONCE
Refills: 0 | Status: COMPLETED | OUTPATIENT
Start: 2024-09-09 | End: 2024-09-09

## 2024-09-09 RX ORDER — ASPIRIN 81 MG
81 TABLET, DELAYED RELEASE (ENTERIC COATED) ORAL DAILY
Refills: 0 | Status: DISCONTINUED | OUTPATIENT
Start: 2024-09-10 | End: 2024-09-23

## 2024-09-09 RX ORDER — SODIUM CHLORIDE 9 MG/ML
1000 INJECTION INTRAMUSCULAR; INTRAVENOUS; SUBCUTANEOUS
Refills: 0 | Status: DISCONTINUED | OUTPATIENT
Start: 2024-09-09 | End: 2024-09-23

## 2024-09-09 RX ORDER — ASPIRIN 81 MG
1 TABLET, DELAYED RELEASE (ENTERIC COATED) ORAL
Qty: 7 | Refills: 0
Start: 2024-09-09 | End: 2024-09-15

## 2024-09-09 RX ORDER — METOPROLOL TARTRATE 100 MG/1
50 TABLET ORAL ONCE
Refills: 0 | Status: COMPLETED | OUTPATIENT
Start: 2024-09-09 | End: 2024-09-09

## 2024-09-09 RX ADMIN — METOPROLOL TARTRATE 50 MILLIGRAM(S): 100 TABLET ORAL at 12:34

## 2024-09-09 RX ADMIN — DILTIAZEM HYDROCHLORIDE 30 MILLIGRAM(S): 5 INJECTION INTRAVENOUS at 12:43

## 2024-09-09 RX ADMIN — SODIUM CHLORIDE 50 MILLILITER(S): 9 INJECTION INTRAMUSCULAR; INTRAVENOUS; SUBCUTANEOUS at 12:32

## 2024-09-09 NOTE — CHART NOTE - NSCHARTNOTEFT_GEN_A_CORE
****** Reasons for NO Cardiac rehab referral rx:              sever aortic stenosis      Roverto Rosas, NP

## 2024-09-09 NOTE — ASU PATIENT PROFILE, ADULT - NSICDXPASTMEDICALHX_GEN_ALL_CORE_FT
PAST MEDICAL HISTORY:  AF (atrial fibrillation)     FOX (acute kidney injury)     Aortic stenosis     Atrial fibrillation with RVR     Chronic kidney disease (CKD)     Colon cancer     Diabetes mellitus     GERD (gastroesophageal reflux disease)     H/O acute heart failure     H/O neuropathy     H/O spinal stenosis     H/O tachycardia-bradycardia syndrome     History of BPH     History of right bundle branch block (RBBB)     HLD (hyperlipidemia)     HTN (hypertension)

## 2024-09-09 NOTE — ASU PATIENT PROFILE, ADULT - FALL HARM RISK - RISK INTERVENTIONS

## 2024-09-09 NOTE — CHART NOTE - NSCHARTNOTEFT_GEN_A_CORE
Pt arrived in [facility] s/p MARGIE to LAD via right radial artery. Site without bleed or hematoma, +2 radial/ulnar pulses, +CMS. Pt denies chest pain and shortness of breath.  EKG without significant changes. Gentle IV hydrations ordered. RRA band to be removed at 1500. Anticipate d/c in 6 hours if pt remains stable.     Roverto Rosas, NP

## 2024-09-09 NOTE — H&P CARDIOLOGY - HISTORY OF PRESENT ILLNESS
Monsignor Rodriguez, is a 83 yo male with PMh of HTN, HLD, DM type 2 ( well managed with diet not on any hyperglycemic meds, last A1C unknown), Afib on Eliquis ( last dose 9/7/2024- afib recently dx this past February), remote colon CA ( 26 years ago s/p colon resection and chemo), FOX/CKD, and Arthitis, spinal stenosis; recent hosp admit ( [facility]) on 02/21/2024 for acute HF exacerbation in the setting of Afib with RVR.  F/u with Dr MARLA Dejesus, seen for AS evaluation. Presents here today for [facility] as pre TAVR work up.     TTE 6/26/2024 showed calcified trileaflet AV PVG 63.3 HAILEE 0.9cm2 Monsignor Rodriguez, is a 85 yo male with PMh of HTN, HLD, DM type 2 ( well managed with diet not on any hyperglycemic meds, last A1C unknown), tachy-chelsea syndrome, baseline RBBB on EKG, Afib on Eliquis ( last dose 9/7/2024- afib recently dx this past February), remote colon CA ( 26 years ago s/p colon resection and chemo), FOX/CKD, and Arthitis, spinal stenosis; recent hosp admit ( [facility]) on 02/21/2024 for acute HF exacerbation in the setting of Afib with RVR.  F/u with Dr MARLA Dejesus, seen for AS evaluation. Presents here today for [facility] as pre TAVR work up.     TTE 6/26/2024 showed calcified trileaflet AV PVG 63.3 HAILEE 0.9cm2

## 2024-09-09 NOTE — ASU DISCHARGE PLAN (ADULT/PEDIATRIC) - CARE PROVIDER_API CALL
Jamie Dejesus  Cardiac Electrophysiology  [address], [address]  [place] [address]  Phone: [phone]  Fax: [phone]  Follow Up Time: 2 weeks

## 2024-09-09 NOTE — H&P CARDIOLOGY - NSICDXPASTMEDICALHX_GEN_ALL_CORE_FT
PAST MEDICAL HISTORY:  AF (atrial fibrillation)     FOX (acute kidney injury)     Aortic stenosis     Atrial fibrillation with RVR     Chronic kidney disease (CKD)     Colon cancer     Diabetes mellitus     GERD (gastroesophageal reflux disease)     H/O acute heart failure     H/O neuropathy     H/O spinal stenosis     History of BPH     HLD (hyperlipidemia)     HTN (hypertension)      PAST MEDICAL HISTORY:  AF (atrial fibrillation)     FOX (acute kidney injury)     Aortic stenosis     Atrial fibrillation with RVR     Chronic kidney disease (CKD)     Colon cancer     Diabetes mellitus     GERD (gastroesophageal reflux disease)     H/O acute heart failure     H/O neuropathy     H/O spinal stenosis     H/O tachycardia-bradycardia syndrome     History of BPH     History of right bundle branch block (RBBB)     HLD (hyperlipidemia)     HTN (hypertension)

## 2024-09-19 ENCOUNTER — APPOINTMENT (OUTPATIENT)
Dept: CARDIOLOGY | Facility: CLINIC | Age: 84
End: 2024-09-19

## 2024-09-19 RX ORDER — APIXABAN 5 MG/1
1 TABLET, FILM COATED ORAL
Qty: 0 | Refills: 0 | DISCHARGE

## 2024-09-24 ENCOUNTER — TRANSCRIPTION ENCOUNTER (OUTPATIENT)
Age: 84
End: 2024-09-24

## 2024-09-24 PROBLEM — K21.9 GASTRO-ESOPHAGEAL REFLUX DISEASE WITHOUT ESOPHAGITIS: Chronic | Status: ACTIVE | Noted: 2024-09-09

## 2024-09-24 PROBLEM — Z86.79 PERSONAL HISTORY OF OTHER DISEASES OF THE CIRCULATORY SYSTEM: Chronic | Status: ACTIVE | Noted: 2024-09-09

## 2024-09-24 PROBLEM — I48.91 UNSPECIFIED ATRIAL FIBRILLATION: Chronic | Status: ACTIVE | Noted: 2024-09-09

## 2024-09-24 PROBLEM — E78.5 HYPERLIPIDEMIA, UNSPECIFIED: Chronic | Status: ACTIVE | Noted: 2024-09-09

## 2024-09-24 PROBLEM — Z86.69 PERSONAL HISTORY OF OTHER DISEASES OF THE NERVOUS SYSTEM AND SENSE ORGANS: Chronic | Status: ACTIVE | Noted: 2024-09-09

## 2024-09-24 PROBLEM — C18.9 MALIGNANT NEOPLASM OF COLON, UNSPECIFIED: Chronic | Status: ACTIVE | Noted: 2024-09-09

## 2024-09-24 PROBLEM — I35.0 NONRHEUMATIC AORTIC (VALVE) STENOSIS: Chronic | Status: ACTIVE | Noted: 2024-09-09

## 2024-09-24 PROBLEM — Z87.39 PERSONAL HISTORY OF OTHER DISEASES OF THE MUSCULOSKELETAL SYSTEM AND CONNECTIVE TISSUE: Chronic | Status: ACTIVE | Noted: 2024-09-09

## 2024-09-24 PROBLEM — Z87.438 PERSONAL HISTORY OF OTHER DISEASES OF MALE GENITAL ORGANS: Chronic | Status: ACTIVE | Noted: 2024-09-09

## 2024-09-24 PROBLEM — N17.9 ACUTE KIDNEY FAILURE, UNSPECIFIED: Chronic | Status: ACTIVE | Noted: 2024-09-09

## 2024-09-24 PROBLEM — I10 ESSENTIAL (PRIMARY) HYPERTENSION: Chronic | Status: ACTIVE | Noted: 2024-09-09

## 2024-09-24 PROBLEM — N18.9 CHRONIC KIDNEY DISEASE, UNSPECIFIED: Chronic | Status: ACTIVE | Noted: 2024-09-09

## 2024-09-24 PROBLEM — E11.9 TYPE 2 DIABETES MELLITUS WITHOUT COMPLICATIONS: Chronic | Status: ACTIVE | Noted: 2024-09-09

## 2024-09-25 ENCOUNTER — APPOINTMENT (OUTPATIENT)
Dept: CARDIOTHORACIC SURGERY | Facility: HOSPITAL | Age: 84
End: 2024-09-25

## 2024-09-25 ENCOUNTER — INPATIENT (INPATIENT)
Facility: HOSPITAL | Age: 84
LOS: 7 days | Discharge: HOME HEALTH | DRG: 307 | End: 2024-10-03
Attending: STUDENT IN AN ORGANIZED HEALTH CARE EDUCATION/TRAINING PROGRAM | Admitting: STUDENT IN AN ORGANIZED HEALTH CARE EDUCATION/TRAINING PROGRAM
Payer: MEDICARE

## 2024-09-25 ENCOUNTER — RESULT REVIEW (OUTPATIENT)
Age: 84
End: 2024-09-25

## 2024-09-25 VITALS
RESPIRATION RATE: 16 BRPM | SYSTOLIC BLOOD PRESSURE: 144 MMHG | DIASTOLIC BLOOD PRESSURE: 79 MMHG | WEIGHT: 234.79 LBS | OXYGEN SATURATION: 98 % | TEMPERATURE: 98 F | HEIGHT: 72 IN | HEART RATE: 59 BPM

## 2024-09-25 DIAGNOSIS — I35.0 NONRHEUMATIC AORTIC (VALVE) STENOSIS: ICD-10-CM

## 2024-09-25 DIAGNOSIS — Z90.49 ACQUIRED ABSENCE OF OTHER SPECIFIED PARTS OF DIGESTIVE TRACT: Chronic | ICD-10-CM

## 2024-09-25 DIAGNOSIS — Z95.5 PRESENCE OF CORONARY ANGIOPLASTY IMPLANT AND GRAFT: Chronic | ICD-10-CM

## 2024-09-25 DIAGNOSIS — Z92.21 PERSONAL HISTORY OF ANTINEOPLASTIC CHEMOTHERAPY: Chronic | ICD-10-CM

## 2024-09-25 LAB
GLUCOSE BLDC GLUCOMTR-MCNC: 133 MG/DL — HIGH (ref 70–99)
GLUCOSE BLDC GLUCOMTR-MCNC: 165 MG/DL — HIGH (ref 70–99)
GLUCOSE BLDC GLUCOMTR-MCNC: 181 MG/DL — HIGH (ref 70–99)
GLUCOSE BLDC GLUCOMTR-MCNC: 207 MG/DL — HIGH (ref 70–99)

## 2024-09-25 PROCEDURE — 93306 TTE W/DOPPLER COMPLETE: CPT | Mod: 26

## 2024-09-25 PROCEDURE — 33361 REPLACE AORTIC VALVE PERQ: CPT | Mod: 62,Q0

## 2024-09-25 PROCEDURE — 93010 ELECTROCARDIOGRAM REPORT: CPT | Mod: 76

## 2024-09-25 PROCEDURE — 33361 REPLACE AORTIC VALVE PERQ: CPT | Mod: Q0,62

## 2024-09-25 DEVICE — CATH TAVR EVOLUT FX 14FR 23-29MM: Type: IMPLANTABLE DEVICE | Status: FUNCTIONAL

## 2024-09-25 DEVICE — GWIRE STR .038X180 STIFF LONG TAPR: Type: IMPLANTABLE DEVICE | Status: FUNCTIONAL

## 2024-09-25 DEVICE — GWIRE GUID  0.035INX150CM: Type: IMPLANTABLE DEVICE | Status: FUNCTIONAL

## 2024-09-25 DEVICE — CATH DIAG DXTERITY AMPLATZ LEFT 5F 100CM AL10: Type: IMPLANTABLE DEVICE | Status: FUNCTIONAL

## 2024-09-25 DEVICE — GUIDEWIRE AMPLATZ EXTRA-STIFF CURVED .035" X 260CM: Type: IMPLANTABLE DEVICE | Status: FUNCTIONAL

## 2024-09-25 DEVICE — CATH VASCULAR EXPO VENTRICULAR PIGTAIL CURVE (PIG 145) 0.045" X 5FR X 10CM: Type: IMPLANTABLE DEVICE | Status: FUNCTIONAL

## 2024-09-25 DEVICE — CATH DIAG 5F JR4 100CM: Type: IMPLANTABLE DEVICE | Status: FUNCTIONAL

## 2024-09-25 DEVICE — CATH VASCULAR EXPO VENTRICULAR PIGTAIL CURVE (PIG) 0.045" X 5FR X 100CM: Type: IMPLANTABLE DEVICE | Status: FUNCTIONAL

## 2024-09-25 DEVICE — WIRE GD SAFARI2 275CM XSML CRV: Type: IMPLANTABLE DEVICE | Status: FUNCTIONAL

## 2024-09-25 DEVICE — VLV AORTIC EVOLUT FX PLUS 29MM: Type: IMPLANTABLE DEVICE | Status: FUNCTIONAL

## 2024-09-25 DEVICE — LOADING SYSTEM EVOLUT FX 23-29MM: Type: IMPLANTABLE DEVICE | Status: FUNCTIONAL

## 2024-09-25 DEVICE — ANGIOSEAL VASC CLOS VIP 6FR: Type: IMPLANTABLE DEVICE | Status: FUNCTIONAL

## 2024-09-25 DEVICE — SHEATH INTRODUCER TERUMO PINNACLE CORONARY 6FR X 10CM X 0.038" MINI WIRE: Type: IMPLANTABLE DEVICE | Status: FUNCTIONAL

## 2024-09-25 DEVICE — INTRODUCER SHEATH DRYSEAL FLEX 18FR X 33CM: Type: IMPLANTABLE DEVICE | Status: FUNCTIONAL

## 2024-09-25 DEVICE — SHEATH INTRODUCER TERUMO PINNACLE CORONARY 8FR X 10CM X 0.038" MINI WIRE: Type: IMPLANTABLE DEVICE | Status: FUNCTIONAL

## 2024-09-25 DEVICE — WIRE GUIDE EXCHANGE J TIP 3MM X 260CM: Type: IMPLANTABLE DEVICE | Status: FUNCTIONAL

## 2024-09-25 DEVICE — PACING CATH PACEL RIGHT HEART CURVE 5FR: Type: IMPLANTABLE DEVICE | Status: FUNCTIONAL

## 2024-09-25 DEVICE — SUT PERCLOSE PROGLIDE 6FR: Type: IMPLANTABLE DEVICE | Status: FUNCTIONAL

## 2024-09-25 RX ORDER — SODIUM CHLORIDE 0.9 % (FLUSH) 0.9 %
3 SYRINGE (ML) INJECTION EVERY 8 HOURS
Refills: 0 | Status: DISCONTINUED | OUTPATIENT
Start: 2024-09-25 | End: 2024-09-25

## 2024-09-25 RX ORDER — ALCOHOL ANTISEPTIC PADS
15 PADS, MEDICATED (EA) TOPICAL ONCE
Refills: 0 | Status: DISCONTINUED | OUTPATIENT
Start: 2024-09-25 | End: 2024-10-03

## 2024-09-25 RX ORDER — SODIUM CHLORIDE 0.9 % (FLUSH) 0.9 %
1000 SYRINGE (ML) INJECTION
Refills: 0 | Status: DISCONTINUED | OUTPATIENT
Start: 2024-09-25 | End: 2024-10-03

## 2024-09-25 RX ORDER — ALCOHOL ANTISEPTIC PADS
25 PADS, MEDICATED (EA) TOPICAL ONCE
Refills: 0 | Status: DISCONTINUED | OUTPATIENT
Start: 2024-09-25 | End: 2024-10-03

## 2024-09-25 RX ORDER — GLUCAGON INJECTION, SOLUTION 0.5 MG/.1ML
1 INJECTION, SOLUTION SUBCUTANEOUS ONCE
Refills: 0 | Status: DISCONTINUED | OUTPATIENT
Start: 2024-09-25 | End: 2024-10-03

## 2024-09-25 RX ORDER — SODIUM CHLORIDE IRRIG SOLUTION 0.9 %
1000 SOLUTION, IRRIGATION IRRIGATION
Refills: 0 | Status: DISCONTINUED | OUTPATIENT
Start: 2024-09-25 | End: 2024-10-03

## 2024-09-25 RX ORDER — LIDOCAINE HCL 20 MG/ML
0.2 AMPUL (ML) INJECTION ONCE
Refills: 0 | Status: DISCONTINUED | OUTPATIENT
Start: 2024-09-25 | End: 2024-09-25

## 2024-09-25 RX ORDER — SPIRONOLACTONE 100 MG/1
12.5 TABLET, FILM COATED ORAL DAILY
Refills: 0 | Status: DISCONTINUED | OUTPATIENT
Start: 2024-09-26 | End: 2024-09-30

## 2024-09-25 RX ORDER — INSULIN LISPRO 100/ML
VIAL (ML) SUBCUTANEOUS AT BEDTIME
Refills: 0 | Status: DISCONTINUED | OUTPATIENT
Start: 2024-09-25 | End: 2024-10-03

## 2024-09-25 RX ORDER — ALCOHOL ANTISEPTIC PADS
12.5 PADS, MEDICATED (EA) TOPICAL ONCE
Refills: 0 | Status: DISCONTINUED | OUTPATIENT
Start: 2024-09-25 | End: 2024-10-03

## 2024-09-25 RX ORDER — ATORVASTATIN CALCIUM 10 MG/1
10 TABLET, FILM COATED ORAL AT BEDTIME
Refills: 0 | Status: DISCONTINUED | OUTPATIENT
Start: 2024-09-25 | End: 2024-10-03

## 2024-09-25 RX ORDER — FUROSEMIDE 10 MG/ML
20 INJECTION INTRAVENOUS DAILY
Refills: 0 | Status: DISCONTINUED | OUTPATIENT
Start: 2024-09-26 | End: 2024-09-30

## 2024-09-25 RX ORDER — INFLUENZA VIRUS VACCINE 15; 15; 15; 15 UG/.5ML; UG/.5ML; UG/.5ML; UG/.5ML
0.5 SUSPENSION INTRAMUSCULAR ONCE
Refills: 0 | Status: DISCONTINUED | OUTPATIENT
Start: 2024-09-25 | End: 2024-10-03

## 2024-09-25 RX ORDER — INSULIN LISPRO 100/ML
VIAL (ML) SUBCUTANEOUS
Refills: 0 | Status: DISCONTINUED | OUTPATIENT
Start: 2024-09-25 | End: 2024-10-03

## 2024-09-25 RX ADMIN — Medication 4: at 13:20

## 2024-09-25 RX ADMIN — Medication 75 MILLIGRAM(S): at 18:42

## 2024-09-25 RX ADMIN — Medication 100 MILLIGRAM(S): at 19:37

## 2024-09-25 RX ADMIN — Medication 20 MILLILITER(S): at 13:19

## 2024-09-25 RX ADMIN — ATORVASTATIN CALCIUM 10 MILLIGRAM(S): 10 TABLET, FILM COATED ORAL at 19:42

## 2024-09-25 NOTE — PROGRESS NOTE ADULT - SUBJECTIVE AND OBJECTIVE BOX
SUBJECTIVE: "Hi." Denies acute chest pain, palpitations, or shortness of breath. S/p TAVR today.     TELEMETRY:      Vital Signs Last 24 Hrs  T(C): 36.6 (09-25-24 @ 14:00), Max: 36.7 (09-25-24 @ 08:23)  T(F): 97.9 (09-25-24 @ 14:00), Max: 98 (09-25-24 @ 08:23)  HR: 64 (09-25-24 @ 15:00) (51 - 65)  BP: 134/65 (09-25-24 @ 15:00) (100/52 - 144/79)  RR: 16 (09-25-24 @ 15:00) (14 - 17)  SpO2: 98% (09-25-24 @ 15:00) (98% - 100%)           INPUT/OUTPUT:  09-25 @ 07:01  -  09-25 @ 15:30  --------------------------------------------------------  IN: 110 mL / OUT: 0 mL / NET: 110 mL      CAPILLARY BLOOD GLUCOSE  POCT Blood Glucose.: 207 mg/dL (25 Sep 2024 13:15)  POCT Blood Glucose.: 181 mg/dL (25 Sep 2024 07:59)          PHYSICAL EXAM  GENERAL: awake, alert, responsive, in no acute distress  NEURO: alert and oriented x 3, nonfocal, no gross deficits appreciated at time of evaluation  CVS : S1S2  LUNGS: non-labored breathing, no use of accessory muscle  GI: abdomen soft, nontender, nondistended, positive bowel sounds       MSK/Extremities: warm, well-perfused, equal strength throughout, B/L LE+DP, no edema   Right groin: nontender, no hematoma, no ecchymosis, CDI      Left groin: nontender, no hematoma, no ecchymosis, CDI; (+) TVP      ACTIVE MEDICATIONS:  atorvastatin 10 milliGRAM(s) Oral at bedtime  cefuroxime  IVPB 1500 milliGRAM(s) IV Intermittent once  cefuroxime  IVPB 1500 milliGRAM(s) IV Intermittent every 8 hours  clopidogrel Tablet 75 milliGRAM(s) Oral daily  dextrose 5%. 1000 milliLiter(s) IV Continuous <Continuous>  dextrose 5%. 1000 milliLiter(s) IV Continuous <Continuous>  dextrose 50% Injectable 12.5 Gram(s) IV Push once  dextrose 50% Injectable 25 Gram(s) IV Push once  dextrose 50% Injectable 25 Gram(s) IV Push once  dextrose Oral Gel 15 Gram(s) Oral once PRN  glucagon  Injectable 1 milliGRAM(s) IntraMuscular once  influenza  Vaccine (HIGH DOSE) 0.5 milliLiter(s) IntraMuscular once  insulin lispro (ADMELOG) corrective regimen sliding scale   SubCutaneous at bedtime  insulin lispro (ADMELOG) corrective regimen sliding scale   SubCutaneous three times a day before meals  sodium chloride 0.9%. 1000 milliLiter(s) IV Continuous <Continuous>      Case discussed in detail with Cardiothoracic Team and Attending. Plan below as per discussion. SUBJECTIVE: "Hi." Denies acute chest pain, palpitations, or shortness of breath. S/p TAVR today.     TELEMETRY:  afib/vpaced    Vital Signs Last 24 Hrs  T(C): 36.6 (09-25-24 @ 14:00), Max: 36.7 (09-25-24 @ 08:23)  T(F): 97.9 (09-25-24 @ 14:00), Max: 98 (09-25-24 @ 08:23)  HR: 64 (09-25-24 @ 15:00) (51 - 65)  BP: 134/65 (09-25-24 @ 15:00) (100/52 - 144/79)  RR: 16 (09-25-24 @ 15:00) (14 - 17)  SpO2: 98% (09-25-24 @ 15:00) (98% - 100%)           INPUT/OUTPUT:  09-25 @ 07:01  -  09-25 @ 15:30  --------------------------------------------------------  IN: 110 mL / OUT: 0 mL / NET: 110 mL      CAPILLARY BLOOD GLUCOSE  POCT Blood Glucose.: 207 mg/dL (25 Sep 2024 13:15)  POCT Blood Glucose.: 181 mg/dL (25 Sep 2024 07:59)          PHYSICAL EXAM  GENERAL: awake, alert, responsive, in no acute distress  NEURO: alert and oriented x 3, nonfocal, no gross deficits appreciated at time of evaluation  CVS : S1S2  LUNGS: non-labored breathing, no use of accessory muscle  GI: abdomen soft, nontender, nondistended, positive bowel sounds       MSK/Extremities: warm, well-perfused, equal strength throughout, B/L LE+DP, no edema   Right groin: nontender, no hematoma, no ecchymosis, CDI      Left groin: nontender, no hematoma, no ecchymosis, CDI; (+) TVP      ACTIVE MEDICATIONS:  atorvastatin 10 milliGRAM(s) Oral at bedtime  cefuroxime  IVPB 1500 milliGRAM(s) IV Intermittent once  cefuroxime  IVPB 1500 milliGRAM(s) IV Intermittent every 8 hours  clopidogrel Tablet 75 milliGRAM(s) Oral daily  dextrose 5%. 1000 milliLiter(s) IV Continuous <Continuous>  dextrose 5%. 1000 milliLiter(s) IV Continuous <Continuous>  dextrose 50% Injectable 12.5 Gram(s) IV Push once  dextrose 50% Injectable 25 Gram(s) IV Push once  dextrose 50% Injectable 25 Gram(s) IV Push once  dextrose Oral Gel 15 Gram(s) Oral once PRN  glucagon  Injectable 1 milliGRAM(s) IntraMuscular once  influenza  Vaccine (HIGH DOSE) 0.5 milliLiter(s) IntraMuscular once  insulin lispro (ADMELOG) corrective regimen sliding scale   SubCutaneous at bedtime  insulin lispro (ADMELOG) corrective regimen sliding scale   SubCutaneous three times a day before meals  sodium chloride 0.9%. 1000 milliLiter(s) IV Continuous <Continuous>      Case discussed in detail with Cardiothoracic Team and Attending. Plan below as per discussion.

## 2024-09-25 NOTE — BRIEF OPERATIVE NOTE - NSICDXBRIEFPROCEDURE_GEN_ALL_CORE_FT
PROCEDURES:  Percutaneous transcatheter aortic valve replacement (TAVR) by femoral artery approach 25-Sep-2024 12:41:34  Connie Stovall

## 2024-09-25 NOTE — CHART NOTE - NSCHARTNOTEFT_GEN_A_CORE
Signout as per Dr. Heaton as follows     Right transfemoral TAVR 29mm Evolut Fx(+)  Anesthesia: MAC  Access  Right groin 18Fr arterial sheath - closed with Perclose x 2   Left groin 6Fr arterial sheath removed with Perclose x 1    Left groin 8Fr venous sheath - sutured in place, TVP connected to external PPM    Arrived to PACU at 1223 s/p TAVR under conscious sedation  Patient is alert and oriented and answering questions appropriately  Denies pain or SOB    T(C): 36.7 (24 @ 10:05), Max: 36.7 (24 @ 08:23)  HR: 59 (24 @ 10:05) (59 - 59)  BP: 144/79 (24 @ 10:05) (144/79 - 144/79)  RR: 16 (24 @ 10:05) (16 - 16)  SpO2: 98% (24 @ 10:05) (98% - 98%)    EK/9/24 - Pre-op  ATRIAL FIBRILLATION WITH RAPID VENTRICULAR RESPONSE  RIGHT BUNDLE BRANCH BLOCK  LEFT POSTERIOR FASCICULAR BLOCK  *** BIFASCICULAR BLOCK ***  Ventricular Rate 114 BPM  QRS Duration 152 ms  Q-T Interval 382 ms  QTC Calculation(Bazett) 526 ms    24  Post Op 1229  NSR with PPM backup Rate 56      QT/QTc 512/494      cefuroxime  IVPB 1500 milliGRAM(s) IV Intermittent once  cefuroxime  IVPB 1500 milliGRAM(s) IV Intermittent every 8 hours  clopidogrel Tablet 75 milliGRAM(s) Oral daily  dextrose 5%. 1000 milliLiter(s) IV Continuous <Continuous>  dextrose 5%. 1000 milliLiter(s) IV Continuous <Continuous>  dextrose 50% Injectable 25 Gram(s) IV Push once  dextrose 50% Injectable 12.5 Gram(s) IV Push once  dextrose 50% Injectable 25 Gram(s) IV Push once  dextrose Oral Gel 15 Gram(s) Oral once PRN  glucagon  Injectable 1 milliGRAM(s) IntraMuscular once  influenza  Vaccine (HIGH DOSE) 0.5 milliLiter(s) IntraMuscular once  insulin lispro (ADMELOG) corrective regimen sliding scale   SubCutaneous three times a day before meals  insulin lispro (ADMELOG) corrective regimen sliding scale   SubCutaneous at bedtime  sodium chloride 0.9%. 1000 milliLiter(s) IV Continuous <Continuous>      on exam  Pulm CTA anteriorly and laterally  CV S1S2 RRR   abd soft non tender +BS  b/l groin - C/D/I with dressing, no hematoma or bleeding. +2 pulses   Left TV introducer in place to external pacemaker, DDD 50      Plan    - CAD with PCI to mLAD on , resume Plavix in 6 hours (pt. no longer taking ASA)   - AFib, resume Eliquis tomorrow    - Abx per protocol   - Echo ordered for AM   - Pre-op RBBB, intermittent CHB intraoperatively, maintain TVP to external PPM overnight, repeat EKG in AM. Hold home Metoprolol and Diltiazem at this time   - Home meds reviewed, resumed atorvastatin, furosemide, and spironolactone     ELISE Stovall NP  Available on TEAMS

## 2024-09-25 NOTE — PRE-ANESTHESIA EVALUATION ADULT - MALLAMPATI CLASS
February 26, 2024     Eun Jimenez PA-C  [address]  [facility] [address]    Patient: Sharon Becker   YOB: 1975   Date of Visit: 2/26/2024       Dear Dr. Eun Jimenez PA-C:    Thank you for referring Sharon Becker to me for evaluation. Below are my notes for this consultation.  If you have questions, please do not hesitate to call me. I look forward to following your patient along with you.       Sincerely,     Gerard Landry MD      CC: Mauricio Ceballos MD  ______________________________________________________________________________________    Subjective     Mr. Sharon Becker is a 48 y.o. year old female who comes in today for follow-up after undergoing combined hysterectomy (with Dr Mauricio Ceballos)  and primary repair of ventral hernia.  This procedure was performed on 1/23/2024 for an indication of large uterine fibroids and a small to moderate-sized ventral hernia.    Patient is doing very well and is pleased with the outcome of the surgery.  She feels like a new person    Tolerating a regular diet, bowel movements are normal    On exam patient looks well    Incisions are healing very nicely         Component  Resulting Agency   FINAL DIAGNOSIS   A. Uterus with cervix, bilateral fallopian tubes and right ovary, total hysterectomy, bilateral salpingo-oophorectomy and right oophorectomy:   -- Endometrium with secretory features and glandular and stromal breakdown  -- Myometrium with leiomyomas and adenomyosis  -- Cervix with nabothian cysts and reactive changes  -- Bilateral fallopian tubes with no significant histopathologic abnormalities  -- Ovary with calcified fibroma and cystic follicles     B.  Hernia sac:  --Mesothelium lined fibroadipose tissue with acute and chronic inflammation         Impression: Doing well following combined hysterectomy and primary repair of ventral hernia    Discussed increasing activity  level    Follow-up with me as needed   Gerard Landry MD 02/26/24 2:53 PM    Class II - visualization of the soft palate, fauces, and uvula

## 2024-09-25 NOTE — PROGRESS NOTE ADULT - ASSESSMENT
85 y/o Male with PMHx significant for HTN, HLD, DM2 (managed with diet, not on any hyperglycemic meds), tachy-chelsea syndrome, baseline RBBB on EKG, Afib (on Eliquis, afib recently dx this past February), remote colon CA (26 years ago s/p colon resection and chemo), FOX/CKD, and spinal stenosis who reports a history of dyspnea on exertion with walking around his home. He underwent cardiac cath on 9/9/24 and had MARGIE to the mid LAD. He is now scheduled for TAVR on 9/25/24. He denies CP, dizziness, syncope, fever or chills.      9/25: s/p Right transfemoral TAVR 29mm Evolut Fx(+). Per Structural Heart NP,  pre-op RBBB, intermittent CHB intraoperatively, maintain TVP to external PPM overnight. Patient transferred to SDU, in no acute distress. Monitor groins. Daily EKG. Repeat TTE in AM. Plavix tonight, Eliquis tomorrow if no contraindication per Dr. Heaton.

## 2024-09-25 NOTE — PRE-ANESTHESIA EVALUATION ADULT - NSANTHOSAYNRD_GEN_A_CORE
No. JUVENTINO screening performed.  STOP BANG Legend: 0-2 = LOW Risk; 3-4 = INTERMEDIATE Risk; 5-8 = HIGH Risk

## 2024-09-25 NOTE — PROGRESS NOTE ADULT - PROBLEM SELECTOR PLAN 1
- s/p TAVR 9/25  - maintain TVP at this time  - monitor B/L groin sites  - TTE in AM  - daily EKG  - on plavix, atorvastatin  - lasix / aldactone to be resumed in AM per Structural Heart  - Hold home Metoprolol and Diltiazem at this time per Structural Heart   - resume Eliquis in AM, if no contraindications  - Case and plan d/w Attending and Structural Heart team

## 2024-09-26 ENCOUNTER — TRANSCRIPTION ENCOUNTER (OUTPATIENT)
Age: 84
End: 2024-09-26

## 2024-09-26 ENCOUNTER — RESULT REVIEW (OUTPATIENT)
Age: 84
End: 2024-09-26

## 2024-09-26 LAB
ALBUMIN SERPL ELPH-MCNC: 3.8 G/DL — SIGNIFICANT CHANGE UP (ref 3.3–5)
ALP SERPL-CCNC: 50 U/L — SIGNIFICANT CHANGE UP (ref 40–120)
ALT FLD-CCNC: 17 U/L — SIGNIFICANT CHANGE UP (ref 10–45)
ANION GAP SERPL CALC-SCNC: 13 MMOL/L — SIGNIFICANT CHANGE UP (ref 5–17)
AST SERPL-CCNC: 15 U/L — SIGNIFICANT CHANGE UP (ref 10–40)
BASOPHILS # BLD AUTO: 0.03 K/UL — SIGNIFICANT CHANGE UP (ref 0–0.2)
BASOPHILS NFR BLD AUTO: 0.3 % — SIGNIFICANT CHANGE UP (ref 0–2)
BILIRUB SERPL-MCNC: 0.9 MG/DL — SIGNIFICANT CHANGE UP (ref 0.2–1.2)
BUN SERPL-MCNC: 28 MG/DL — HIGH (ref 7–23)
CALCIUM SERPL-MCNC: 8.9 MG/DL — SIGNIFICANT CHANGE UP (ref 8.4–10.5)
CHLORIDE SERPL-SCNC: 108 MMOL/L — SIGNIFICANT CHANGE UP (ref 96–108)
CO2 SERPL-SCNC: 19 MMOL/L — LOW (ref 22–31)
CREAT SERPL-MCNC: 1.4 MG/DL — HIGH (ref 0.5–1.3)
EGFR: 50 ML/MIN/1.73M2 — LOW
EOSINOPHIL # BLD AUTO: 0.18 K/UL — SIGNIFICANT CHANGE UP (ref 0–0.5)
EOSINOPHIL NFR BLD AUTO: 1.7 % — SIGNIFICANT CHANGE UP (ref 0–6)
GLUCOSE BLDC GLUCOMTR-MCNC: 143 MG/DL — HIGH (ref 70–99)
GLUCOSE BLDC GLUCOMTR-MCNC: 145 MG/DL — HIGH (ref 70–99)
GLUCOSE BLDC GLUCOMTR-MCNC: 173 MG/DL — HIGH (ref 70–99)
GLUCOSE BLDC GLUCOMTR-MCNC: 211 MG/DL — HIGH (ref 70–99)
GLUCOSE SERPL-MCNC: 120 MG/DL — HIGH (ref 70–99)
HCT VFR BLD CALC: 39.2 % — SIGNIFICANT CHANGE UP (ref 39–50)
HGB BLD-MCNC: 12.7 G/DL — LOW (ref 13–17)
IMM GRANULOCYTES NFR BLD AUTO: 0.4 % — SIGNIFICANT CHANGE UP (ref 0–0.9)
LYMPHOCYTES # BLD AUTO: 1.37 K/UL — SIGNIFICANT CHANGE UP (ref 1–3.3)
LYMPHOCYTES # BLD AUTO: 13.1 % — SIGNIFICANT CHANGE UP (ref 13–44)
MCHC RBC-ENTMCNC: 31.1 PG — SIGNIFICANT CHANGE UP (ref 27–34)
MCHC RBC-ENTMCNC: 32.4 GM/DL — SIGNIFICANT CHANGE UP (ref 32–36)
MCV RBC AUTO: 96.1 FL — SIGNIFICANT CHANGE UP (ref 80–100)
MONOCYTES # BLD AUTO: 1.15 K/UL — HIGH (ref 0–0.9)
MONOCYTES NFR BLD AUTO: 11 % — SIGNIFICANT CHANGE UP (ref 2–14)
NEUTROPHILS # BLD AUTO: 7.68 K/UL — HIGH (ref 1.8–7.4)
NEUTROPHILS NFR BLD AUTO: 73.5 % — SIGNIFICANT CHANGE UP (ref 43–77)
NRBC # BLD: 0 /100 WBCS — SIGNIFICANT CHANGE UP (ref 0–0)
PLATELET # BLD AUTO: 211 K/UL — SIGNIFICANT CHANGE UP (ref 150–400)
POTASSIUM SERPL-MCNC: 4.8 MMOL/L — SIGNIFICANT CHANGE UP (ref 3.5–5.3)
POTASSIUM SERPL-SCNC: 4.8 MMOL/L — SIGNIFICANT CHANGE UP (ref 3.5–5.3)
PROT SERPL-MCNC: 6.4 G/DL — SIGNIFICANT CHANGE UP (ref 6–8.3)
RBC # BLD: 4.08 M/UL — LOW (ref 4.2–5.8)
RBC # FLD: 12.2 % — SIGNIFICANT CHANGE UP (ref 10.3–14.5)
SODIUM SERPL-SCNC: 140 MMOL/L — SIGNIFICANT CHANGE UP (ref 135–145)
WBC # BLD: 10.45 K/UL — SIGNIFICANT CHANGE UP (ref 3.8–10.5)
WBC # FLD AUTO: 10.45 K/UL — SIGNIFICANT CHANGE UP (ref 3.8–10.5)

## 2024-09-26 PROCEDURE — 99232 SBSQ HOSP IP/OBS MODERATE 35: CPT | Mod: FS

## 2024-09-26 PROCEDURE — 93306 TTE W/DOPPLER COMPLETE: CPT | Mod: 26

## 2024-09-26 RX ORDER — APIXABAN 5 MG/1
2.5 TABLET, FILM COATED ORAL EVERY 12 HOURS
Refills: 0 | Status: DISCONTINUED | OUTPATIENT
Start: 2024-09-26 | End: 2024-10-01

## 2024-09-26 RX ORDER — METOPROLOL TARTRATE 50 MG
50 TABLET ORAL
Refills: 0 | Status: DISCONTINUED | OUTPATIENT
Start: 2024-09-26 | End: 2024-09-27

## 2024-09-26 RX ORDER — CHLORHEXIDINE GLUCONATE ORAL RINSE 1.2 MG/ML
1 SOLUTION DENTAL
Refills: 0 | Status: DISCONTINUED | OUTPATIENT
Start: 2024-09-26 | End: 2024-10-03

## 2024-09-26 RX ORDER — METOPROLOL TARTRATE 50 MG
25 TABLET ORAL DAILY
Refills: 0 | Status: DISCONTINUED | OUTPATIENT
Start: 2024-09-26 | End: 2024-09-26

## 2024-09-26 RX ADMIN — APIXABAN 2.5 MILLIGRAM(S): 5 TABLET, FILM COATED ORAL at 17:16

## 2024-09-26 RX ADMIN — Medication 25 MILLIGRAM(S): at 10:29

## 2024-09-26 RX ADMIN — FUROSEMIDE 20 MILLIGRAM(S): 10 INJECTION INTRAVENOUS at 05:40

## 2024-09-26 RX ADMIN — SPIRONOLACTONE 12.5 MILLIGRAM(S): 100 TABLET, FILM COATED ORAL at 05:40

## 2024-09-26 RX ADMIN — Medication 50 MILLIGRAM(S): at 17:16

## 2024-09-26 RX ADMIN — Medication 75 MILLIGRAM(S): at 11:12

## 2024-09-26 RX ADMIN — Medication 100 MILLIGRAM(S): at 05:41

## 2024-09-26 RX ADMIN — ATORVASTATIN CALCIUM 10 MILLIGRAM(S): 10 TABLET, FILM COATED ORAL at 20:33

## 2024-09-26 RX ADMIN — Medication 4: at 11:12

## 2024-09-26 NOTE — DISCHARGE NOTE PROVIDER - NSDCFUADDAPPT_GEN_ALL_CORE_FT
see Dr Ron   215 pm    10/8      EP dept Follow up    10/11   240 pm see Dr Ron   215 pm    10/8      EP dept Follow up    10/11   240 pm     follow up with your own cardiologist and PCP 1-2 weeks

## 2024-09-26 NOTE — DISCHARGE NOTE NURSING/CASE MANAGEMENT/SOCIAL WORK - PATIENT PORTAL LINK FT
You can access the FollowMyHealth Patient Portal offered by [facility] by registering at the following website: [email or web address]. By joining [facility]’s FollowMyHealth portal, you will also be able to view your health information using other applications (apps) compatible with our system.

## 2024-09-26 NOTE — DISCHARGE NOTE PROVIDER - HOSPITAL COURSE
85 y/o Male with PMHx significant for HTN, HLD, DM2 (managed with diet, not on any hyperglycemic meds), tachy-chelsea syndrome, baseline RBBB on EKG, Afib (on Eliquis, afib recently dx this past February), remote colon CA (26 years ago s/p colon resection and chemo), FOX/CKD, and spinal stenosis who reports a history of dyspnea on exertion with walking around his home. He underwent cardiac cath on 9/9/24 and had MARGIE to the mid LAD. He is now scheduled for TAVR on 9/25/24. He denies CP, dizziness, syncope, fever or chills.      9/25: s/p Right transfemoral TAVR 29mm Evolut Fx (+). Per Structural Heart NP,  pre-op RBBB, intermittent CHB intraoperatively, maintain TVP to external PPM overnight. Patient transferred to SDU, in no acute distress. Monitor groins. Daily EKG. Repeat TTE in AM. Plavix tonight, Eliquis tomorrow if no contraindication per Dr. Heaton.   9/26 PW to be d/c, no pacing overnight.   Will discuss medications with Dr. Heaton  9/27 afib 130's  3.4 second pause .  dw EP--will start amio load and observe on tele.  If any further pauses will possible require PPM.    9/28 Pt converted from SR to aflutter 130;s this afternoon. Continue with amiodarone load. No bradycardia of pauses on tele. Replete K and Mg. CXR r/o effusion  9/29 CXR clear, No bradycardia or AV block on tele. Anticipate discharge tomorrow  9/30 VSS; afib  92 amio load. creat to 1.78.  D/C diuretics   10/1 Completed amiodarone load 5G. Lopressor increased to 25 bid. House EP consulted as requested by Dr. Dejesus for possible cardioversion.   10/2 s/p PPM implant placement.  Tolerated well.  Started on Toprol 100 mg PO daily. Continue with current medication regimen.  Structural Team following.    Disposition: Home once medically cleared. 85 y/o Male with PMHx significant for HTN, HLD, DM2 (managed with diet, not on any hyperglycemic meds), tachy-chelsea syndrome, baseline RBBB on EKG, Afib (on Eliquis, afib recently dx this past February), remote colon CA (26 years ago s/p colon resection and chemo), FOX/CKD, and spinal stenosis who reports a history of dyspnea on exertion with walking around his home. He underwent cardiac cath on 9/9/24 and had MARGIE to the mid LAD. He is now scheduled for TAVR on 9/25/24. He denies CP, dizziness, syncope, fever or chills.      9/25: s/p Right transfemoral TAVR 29mm Evolut Fx (+). Per Structural Heart NP,  pre-op RBBB, intermittent CHB intraoperatively, maintain TVP to external PPM overnight. Patient transferred to SDU, in no acute distress. Monitor groins. Daily EKG. Repeat TTE in AM. Plavix tonight, Eliquis tomorrow if no contraindication per Dr. Heaton.   9/26 PW to be d/c, no pacing overnight.   Will discuss medications with Dr. Heaton  9/27 afib 130's  3.4 second pause .  dw EP--will start amio load and observe on tele.  If any further pauses will possible require PPM.    9/28 Pt converted from SR to aflutter 130;s this afternoon. Continue with amiodarone load. No bradycardia of pauses on tele. Replete K and Mg. CXR r/o effusion  9/29 CXR clear, No bradycardia or AV block on tele. Anticipate discharge tomorrow  9/30 VSS; afib  92 amio load. creat to 1.78.  D/C diuretics   10/1 Completed amiodarone load 5G. Lopressor increased to 25 bid. House EP consulted as requested by Dr. Dejesus for possible cardioversion.   10/2 s/p PPM implant placement.  Tolerated well.  Started on Toprol 100 mg PO daily. Continue with current medication regimen.  Structural Team following.    10/3   vss   dc home  not on diuretics   elevated creatinine to follow up with PCP/cardiologist

## 2024-09-26 NOTE — DISCHARGE NOTE NURSING/CASE MANAGEMENT/SOCIAL WORK - NSDCPEFALRISK_GEN_ALL_CORE
For information on Fall & Injury Prevention, visit: [email or web address] OR  [email or web address] OR  https://www.cdc.gov/steadi/patient.html

## 2024-09-26 NOTE — DISCHARGE NOTE NURSING/CASE MANAGEMENT/SOCIAL WORK - NSSCTYPOFSERV_GEN_ALL_CORE
Visiting nurse to call & arrange home care appointment for day after hospital discharge.  Skilled Nurse and Home Physical Therapy: Visiting nurse to call & arrange home care appointment for day after hospital discharge.

## 2024-09-26 NOTE — DISCHARGE NOTE PROVIDER - NSDCMRMEDTOKEN_GEN_ALL_CORE_FT
atorvastatin 10 mg oral tablet: 1 tab(s) orally once a day (at bedtime)  Cardizem 30 mg oral tablet: 1 tab(s) orally 3 times a day  clopidogrel 75 mg oral tablet: 1 tab(s) orally once a day  Eliquis 2.5 mg oral tablet: 1 tab(s) orally 2 times a day  furosemide 20 mg oral tablet: 1 tab(s) orally once a day  Metoprolol Tartrate 50 mg oral tablet: 1 tab(s) orally 2 times a day  spironolactone 25 mg oral tablet: 0.5 tab(s) orally once a day   atorvastatin 10 mg oral tablet: 1 tab(s) orally once a day (at bedtime)  Cardizem 30 mg oral tablet: 1 tab(s) orally 3 times a day  clopidogrel 75 mg oral tablet: 1 tab(s) orally once a day  Eliquis 2.5 mg oral tablet: 1 tab(s) orally 2 times a day  furosemide 20 mg oral tablet: 1 tab(s) orally once a day  melatonin 3 mg oral tablet: 1 tab(s) orally once a day (at bedtime) As needed Sleep  metoprolol succinate 100 mg oral tablet, extended release: 1 tab(s) orally once a day  Metoprolol Tartrate 50 mg oral tablet: 1 tab(s) orally 2 times a day  spironolactone 25 mg oral tablet: 0.5 tab(s) orally once a day   atorvastatin 10 mg oral tablet: 1 tab(s) orally once a day (at bedtime)  clopidogrel 75 mg oral tablet: 1 tab(s) orally once a day  Eliquis 2.5 mg oral tablet: 1 tab(s) orally 2 times a day  melatonin 3 mg oral tablet: 1 tab(s) orally once a day (at bedtime) As needed Sleep  metoprolol succinate 100 mg oral tablet, extended release: 1 tab(s) orally once a day

## 2024-09-26 NOTE — PROGRESS NOTE ADULT - SUBJECTIVE AND OBJECTIVE BOX
VITAL SIGNS    Telemetry:  afib 80    Vital Signs Last 24 Hrs  T(C): 36.7 (09-26-24 @ 03:46), Max: 36.9 (09-25-24 @ 15:32)  T(F): 98 (09-26-24 @ 03:46), Max: 98.5 (09-25-24 @ 15:32)  HR: 85 (09-26-24 @ 03:46) (51 - 110)  BP: 154/66 (09-26-24 @ 03:46) (100/52 - 154/66)  RR: 18 (09-26-24 @ 03:46) (14 - 18)  SpO2: 96% (09-26-24 @ 03:46) (95% - 100%)                   09-25 @ 07:01  -  09-26 @ 07:00  --------------------------------------------------------  IN: 420 mL / OUT: 600 mL / NET: -180 mL    09-26 @ 07:01  -  09-26 @ 09:16  --------------------------------------------------------  IN: 0 mL / OUT: 400 mL / NET: -400 mL          Daily Height in cm: 182.88 (25 Sep 2024 10:05)    Daily             CAPILLARY BLOOD GLUCOSE      POCT Blood Glucose.: 145 mg/dL (26 Sep 2024 07:56)  POCT Blood Glucose.: 165 mg/dL (25 Sep 2024 21:15)  POCT Blood Glucose.: 133 mg/dL (25 Sep 2024 16:30)  POCT Blood Glucose.: 207 mg/dL (25 Sep 2024 13:15)                Pacing Wires        [ x ]   Settings:     vvi                             Isolated  [  ]    Coumadin    [ ] YES          [  x]      NO           eliquis                        PHYSICAL EXAM        Neurology: alert and oriented x 3, nonfocal, no gross deficits  CV : s1 s2 RRR    Lungs: cta  Abdomen: soft, nontender, nondistended, positive bowel sounds                    :    voiding       Extremities:     - edema   /  -   calve tenderness ,      r groin, L groin  pw , site cdi          atorvastatin 10 milliGRAM(s) Oral at bedtime  cefuroxime  IVPB 1500 milliGRAM(s) IV Intermittent once  clopidogrel Tablet 75 milliGRAM(s) Oral daily  dextrose 5%. 1000 milliLiter(s) IV Continuous <Continuous>  dextrose 5%. 1000 milliLiter(s) IV Continuous <Continuous>  dextrose 50% Injectable 12.5 Gram(s) IV Push once  dextrose 50% Injectable 25 Gram(s) IV Push once  dextrose 50% Injectable 25 Gram(s) IV Push once  dextrose Oral Gel 15 Gram(s) Oral once PRN  furosemide    Tablet 20 milliGRAM(s) Oral daily  glucagon  Injectable 1 milliGRAM(s) IntraMuscular once  influenza  Vaccine (HIGH DOSE) 0.5 milliLiter(s) IntraMuscular once  insulin lispro (ADMELOG) corrective regimen sliding scale   SubCutaneous three times a day before meals  insulin lispro (ADMELOG) corrective regimen sliding scale   SubCutaneous at bedtime  sodium chloride 0.9%. 1000 milliLiter(s) IV Continuous <Continuous>  spironolactone 12.5 milliGRAM(s) Oral daily                    Physical Therapy Rec:   Home  [  ]   Home w/ PT  [  ]  Rehab  [  ]  Discussed with Cardiothoracic Team at AM rounds.

## 2024-09-26 NOTE — DISCHARGE NOTE PROVIDER - CARE PROVIDER_API CALL
Jason Ron  Thoracic and Cardiac Surgery  [address] [place] [id number]  Phone: [phone]  Fax: [phone]  Scheduled Appointment: 10/01/2024 10:15 AM

## 2024-09-26 NOTE — PROGRESS NOTE ADULT - ASSESSMENT
83 y/o Male with PMHx significant for HTN, HLD, DM2 (managed with diet, not on any hyperglycemic meds), tachy-chelsae syndrome, baseline RBBB on EKG, Afib (on Eliquis, afib recently dx this past February), remote colon CA (26 years ago s/p colon resection and chemo), FOX/CKD, and spinal stenosis who reports a history of dyspnea on exertion with walking around his home. He underwent cardiac cath on 9/9/24 and had MARGIE to the mid LAD. He is now scheduled for TAVR on 9/25/24. He denies CP, dizziness, syncope, fever or chills.      9/25: s/p Right transfemoral TAVR 29mm Evolut Fx(+). Per Structural Heart NP,  pre-op RBBB, intermittent CHB intraoperatively, maintain TVP to external PPM overnight. Patient transferred to SDU, in no acute distress. Monitor groins. Daily EKG. Repeat TTE in AM. Plavix tonight, Eliquis tomorrow if no contraindication per Dr. Heaton.   9/26 PW to be d/c, no pacing overnight.   Will discuss medications with Dr Heaton

## 2024-09-27 DIAGNOSIS — I48.91 UNSPECIFIED ATRIAL FIBRILLATION: ICD-10-CM

## 2024-09-27 LAB
ANION GAP SERPL CALC-SCNC: 13 MMOL/L — SIGNIFICANT CHANGE UP (ref 5–17)
BUN SERPL-MCNC: 26 MG/DL — HIGH (ref 7–23)
CALCIUM SERPL-MCNC: 8.8 MG/DL — SIGNIFICANT CHANGE UP (ref 8.4–10.5)
CHLORIDE SERPL-SCNC: 105 MMOL/L — SIGNIFICANT CHANGE UP (ref 96–108)
CO2 SERPL-SCNC: 21 MMOL/L — LOW (ref 22–31)
CREAT SERPL-MCNC: 1.46 MG/DL — HIGH (ref 0.5–1.3)
EGFR: 47 ML/MIN/1.73M2 — LOW
GLUCOSE BLDC GLUCOMTR-MCNC: 126 MG/DL — HIGH (ref 70–99)
GLUCOSE BLDC GLUCOMTR-MCNC: 151 MG/DL — HIGH (ref 70–99)
GLUCOSE BLDC GLUCOMTR-MCNC: 154 MG/DL — HIGH (ref 70–99)
GLUCOSE BLDC GLUCOMTR-MCNC: 157 MG/DL — HIGH (ref 70–99)
GLUCOSE SERPL-MCNC: 140 MG/DL — HIGH (ref 70–99)
HCT VFR BLD CALC: 37.4 % — LOW (ref 39–50)
HGB BLD-MCNC: 12.5 G/DL — LOW (ref 13–17)
MAGNESIUM SERPL-MCNC: 2.1 MG/DL — SIGNIFICANT CHANGE UP (ref 1.6–2.6)
MCHC RBC-ENTMCNC: 31.6 PG — SIGNIFICANT CHANGE UP (ref 27–34)
MCHC RBC-ENTMCNC: 33.4 GM/DL — SIGNIFICANT CHANGE UP (ref 32–36)
MCV RBC AUTO: 94.4 FL — SIGNIFICANT CHANGE UP (ref 80–100)
NRBC # BLD: 0 /100 WBCS — SIGNIFICANT CHANGE UP (ref 0–0)
PHOSPHATE SERPL-MCNC: 2.7 MG/DL — SIGNIFICANT CHANGE UP (ref 2.5–4.5)
PLATELET # BLD AUTO: 202 K/UL — SIGNIFICANT CHANGE UP (ref 150–400)
POTASSIUM SERPL-MCNC: 3.9 MMOL/L — SIGNIFICANT CHANGE UP (ref 3.5–5.3)
POTASSIUM SERPL-SCNC: 3.9 MMOL/L — SIGNIFICANT CHANGE UP (ref 3.5–5.3)
RBC # BLD: 3.96 M/UL — LOW (ref 4.2–5.8)
RBC # FLD: 11.9 % — SIGNIFICANT CHANGE UP (ref 10.3–14.5)
SODIUM SERPL-SCNC: 139 MMOL/L — SIGNIFICANT CHANGE UP (ref 135–145)
WBC # BLD: 8.88 K/UL — SIGNIFICANT CHANGE UP (ref 3.8–10.5)
WBC # FLD AUTO: 8.88 K/UL — SIGNIFICANT CHANGE UP (ref 3.8–10.5)

## 2024-09-27 PROCEDURE — 93010 ELECTROCARDIOGRAM REPORT: CPT

## 2024-09-27 PROCEDURE — 99232 SBSQ HOSP IP/OBS MODERATE 35: CPT

## 2024-09-27 RX ORDER — AMIODARONE HYDROCHLORIDE 50 MG/ML
INJECTION, SOLUTION INTRAVENOUS
Refills: 0 | Status: DISCONTINUED | OUTPATIENT
Start: 2024-09-27 | End: 2024-10-03

## 2024-09-27 RX ORDER — AMIODARONE HYDROCHLORIDE 50 MG/ML
400 INJECTION, SOLUTION INTRAVENOUS EVERY 8 HOURS
Refills: 0 | Status: COMPLETED | OUTPATIENT
Start: 2024-09-27 | End: 2024-09-30

## 2024-09-27 RX ORDER — DILTIAZEM HCL 300 MG
5 CAPSULE, EXTENDED RELEASE 24HR ORAL
Qty: 125 | Refills: 0 | Status: DISCONTINUED | OUTPATIENT
Start: 2024-09-27 | End: 2024-09-27

## 2024-09-27 RX ORDER — AMIODARONE HYDROCHLORIDE 50 MG/ML
200 INJECTION, SOLUTION INTRAVENOUS DAILY
Refills: 0 | Status: DISCONTINUED | OUTPATIENT
Start: 2024-10-01 | End: 2024-10-03

## 2024-09-27 RX ADMIN — APIXABAN 2.5 MILLIGRAM(S): 5 TABLET, FILM COATED ORAL at 05:20

## 2024-09-27 RX ADMIN — FUROSEMIDE 20 MILLIGRAM(S): 10 INJECTION INTRAVENOUS at 05:20

## 2024-09-27 RX ADMIN — ATORVASTATIN CALCIUM 10 MILLIGRAM(S): 10 TABLET, FILM COATED ORAL at 21:11

## 2024-09-27 RX ADMIN — AMIODARONE HYDROCHLORIDE 400 MILLIGRAM(S): 50 INJECTION, SOLUTION INTRAVENOUS at 21:11

## 2024-09-27 RX ADMIN — AMIODARONE HYDROCHLORIDE 400 MILLIGRAM(S): 50 INJECTION, SOLUTION INTRAVENOUS at 17:20

## 2024-09-27 RX ADMIN — Medication 2: at 08:05

## 2024-09-27 RX ADMIN — Medication 2: at 12:26

## 2024-09-27 RX ADMIN — SPIRONOLACTONE 12.5 MILLIGRAM(S): 100 TABLET, FILM COATED ORAL at 05:20

## 2024-09-27 RX ADMIN — Medication 75 MILLIGRAM(S): at 17:21

## 2024-09-27 RX ADMIN — AMIODARONE HYDROCHLORIDE 400 MILLIGRAM(S): 50 INJECTION, SOLUTION INTRAVENOUS at 09:36

## 2024-09-27 RX ADMIN — APIXABAN 2.5 MILLIGRAM(S): 5 TABLET, FILM COATED ORAL at 17:21

## 2024-09-27 RX ADMIN — CHLORHEXIDINE GLUCONATE ORAL RINSE 1 APPLICATION(S): 1.2 SOLUTION DENTAL at 17:24

## 2024-09-27 NOTE — PROGRESS NOTE ADULT - SUBJECTIVE AND OBJECTIVE BOX
S:  c/o dizziness earlier when he tried to get up    Telemetry:  afib 130's.  3.4 sec pause conversion pause    Vital Signs Last 24 Hrs  T(C): 37.4 (09-27-24 @ 11:34), Max: 37.4 (09-27-24 @ 11:34)  T(F): 99.3 (09-27-24 @ 11:34), Max: 99.3 (09-27-24 @ 11:34)  HR: 121 (09-27-24 @ 11:34) (66 - 130)  BP: 121/63 (09-27-24 @ 11:34) (103/52 - 157/67)  RR: 18 (09-27-24 @ 11:34) (18 - 18)  SpO2: 96% (09-27-24 @ 11:34) (93% - 97%)                   09-26 @ 07:01  -  09-27 @ 07:00  --------------------------------------------------------  IN: 190 mL / OUT: 1875 mL / NET: -1685 mL    09-27 @ 07:01  -  09-27 @ 12:07  --------------------------------------------------------  IN: 240 mL / OUT: 450 mL / NET: -210 mL             POCT Blood Glucose.: 151 mg/dL (27 Sep 2024 07:26)  POCT Blood Glucose.: 173 mg/dL (26 Sep 2024 21:28)  POCT Blood Glucose.: 143 mg/dL (26 Sep 2024 16:49)                                         12.5   8.88  )-----------( 202      ( 27 Sep 2024 05:42 )             37.4       09-27    139  |  105  |  26[H]  ----------------------------<  140[H]  3.9   |  21[L]  |  1.46[H]    Ca    8.8      27 Sep 2024 05:43  Phos  2.7     09-27  Mg     2.1     09-27    TPro  6.4  /  Alb  3.8  /  TBili  0.9  /  DBili  x   /  AST  15  /  ALT  17  /  AlkPhos  50  09-26          PHYSICAL EXAM:    Neurology: alert and oriented x 3, nonfocal, no gross deficits    CV :  S1S2 irreg irreg    Lungs:  clears to ausc.  no w/r/r    Abdomen: soft, nontender, nondistended, positive bowel sounds,              Extremities:  b/l groins stable.              aMIOdarone    Tablet   Oral   aMIOdarone    Tablet 400 milliGRAM(s) Oral every 8 hours  apixaban 2.5 milliGRAM(s) Oral every 12 hours  atorvastatin 10 milliGRAM(s) Oral at bedtime  cefuroxime  IVPB 1500 milliGRAM(s) IV Intermittent once  chlorhexidine 2% Cloths 1 Application(s) Topical <User Schedule>  clopidogrel Tablet 75 milliGRAM(s) Oral daily  dextrose 5%. 1000 milliLiter(s) IV Continuous <Continuous>  dextrose 5%. 1000 milliLiter(s) IV Continuous <Continuous>  dextrose 50% Injectable 12.5 Gram(s) IV Push once  dextrose 50% Injectable 25 Gram(s) IV Push once  dextrose 50% Injectable 25 Gram(s) IV Push once  dextrose Oral Gel 15 Gram(s) Oral once PRN  furosemide    Tablet 20 milliGRAM(s) Oral daily  glucagon  Injectable 1 milliGRAM(s) IntraMuscular once  influenza  Vaccine (HIGH DOSE) 0.5 milliLiter(s) IntraMuscular once  insulin lispro (ADMELOG) corrective regimen sliding scale   SubCutaneous at bedtime  insulin lispro (ADMELOG) corrective regimen sliding scale   SubCutaneous three times a day before meals  sodium chloride 0.9%. 1000 milliLiter(s) IV Continuous <Continuous>  spironolactone 12.5 milliGRAM(s) Oral daily                              Physical Therapy Rec:   Home  [x  ]   Home w/ PT  [  ]  Rehab  [  ]    Discussed with Cardiothoracic Team at AM rounds.

## 2024-09-27 NOTE — PROGRESS NOTE ADULT - PROBLEM SELECTOR PLAN 1
- s/p TAVR 9/25  - monitor B/L groin sites  - daily EKG  - on plavix, atorvastatin  - lasix / aldactone to be resumed in AM per Structural Heart  - Hold home Metoprolol and Diltiazem at this time per Structural Heart   -amiodarone load started  - resume Eliquis in AM, if no contraindications  - Case and plan d/w Attending and Structural Heart team

## 2024-09-27 NOTE — PROGRESS NOTE ADULT - PROBLEM SELECTOR PLAN 2
3.4 second conversion pause  hold BB and Cardizem  start amio load  zoll pads placed  may require PPM if further pauses  EP following--Dr Garcia

## 2024-09-27 NOTE — PROGRESS NOTE ADULT - ASSESSMENT
85 y/o Male with PMHx significant for HTN, HLD, DM2 (managed with diet, not on any hyperglycemic meds), tachy-chelsea syndrome, baseline RBBB on EKG, Afib (on Eliquis, afib recently dx this past February), remote colon CA (26 years ago s/p colon resection and chemo), FOX/CKD, and spinal stenosis who reports a history of dyspnea on exertion with walking around his home. He underwent cardiac cath on 9/9/24 and had MARGIE to the mid LAD. He is now scheduled for TAVR on 9/25/24. He denies CP, dizziness, syncope, fever or chills.      9/25: s/p Right transfemoral TAVR 29mm Evolut Fx(+). Per Structural Heart NP,  pre-op RBBB, intermittent CHB intraoperatively, maintain TVP to external PPM overnight. Patient transferred to SDU, in no acute distress. Monitor groins. Daily EKG. Repeat TTE in AM. Plavix tonight, Eliquis tomorrow if no contraindication per Dr. Heaton.   9/26 PW to be d/c, no pacing overnight.   Will discuss medications with Dr Heaton  9/27 afib 130's  3.4 second pause .  dw EP--will start amio load and observe on tele.  If any further pauses will possible require PPM.

## 2024-09-27 NOTE — CONSULT NOTE ADULT - SUBJECTIVE AND OBJECTIVE BOX
CHIEF COMPLAINT:AFIb dizziness, PAF      PAST MEDICAL & SURGICAL HISTORY:    84 yr male with h/o CAD Aortic stenosis, PAF, baseline first degree and RBBB,  s/p MARGIE , baseline sinus bradycardia, first degree RBBB, with AFIB RVR POD 3, Requiring temp pacing perioperatively.      HTN (hypertension)      HLD (hyperlipidemia)      AF (atrial fibrillation)      Aortic stenosis      Diabetes mellitus      FOX (acute kidney injury)      Chronic kidney disease (CKD)      H/O spinal stenosis      H/O acute heart failure      Atrial fibrillation with RVR      GERD (gastroesophageal reflux disease)      History of BPH      Colon cancer      H/O neuropathy      H/O tachycardia-bradycardia syndrome      History of right bundle branch block (RBBB)      S/P colon resection      History of chemotherapy      Stented coronary artery          HPI:                PREVIOUS DIAGNOSTIC TESTING:      ECHO  FINDINGS:    STRESS  FINDINGS:    CATHETERIZATION  FINDINGS:    ELECTROPHYSIOLOGY STUDY  FINDINGS:    CAROTID ULTRASOUND:  FINDINGS    VENOUS DUPLEX SCAN:  FINDINGS:    CHEST CT PULMONARY ANGIO with IV Contrast:  FINDINGS:  MEDICATIONS  (STANDING):  aMIOdarone    Tablet   Oral   aMIOdarone    Tablet 400 milliGRAM(s) Oral every 8 hours  apixaban 2.5 milliGRAM(s) Oral every 12 hours  atorvastatin 10 milliGRAM(s) Oral at bedtime  cefuroxime  IVPB 1500 milliGRAM(s) IV Intermittent once  chlorhexidine 2% Cloths 1 Application(s) Topical <User Schedule>  clopidogrel Tablet 75 milliGRAM(s) Oral daily  dextrose 5%. 1000 milliLiter(s) (50 mL/Hr) IV Continuous <Continuous>  dextrose 5%. 1000 milliLiter(s) (100 mL/Hr) IV Continuous <Continuous>  dextrose 50% Injectable 25 Gram(s) IV Push once  dextrose 50% Injectable 12.5 Gram(s) IV Push once  dextrose 50% Injectable 25 Gram(s) IV Push once  furosemide    Tablet 20 milliGRAM(s) Oral daily  glucagon  Injectable 1 milliGRAM(s) IntraMuscular once  influenza  Vaccine (HIGH DOSE) 0.5 milliLiter(s) IntraMuscular once  insulin lispro (ADMELOG) corrective regimen sliding scale   SubCutaneous three times a day before meals  insulin lispro (ADMELOG) corrective regimen sliding scale   SubCutaneous at bedtime  sodium chloride 0.9%. 1000 milliLiter(s) (20 mL/Hr) IV Continuous <Continuous>  spironolactone 12.5 milliGRAM(s) Oral daily    MEDICATIONS  (PRN):  dextrose Oral Gel 15 Gram(s) Oral once PRN Blood Glucose LESS THAN 70 milliGRAM(s)/deciliter      FAMILY HISTORY:      SOCIAL HISTORY:    CIGARETTES:    ALCOHOL:    REVIEW OF SYSTEMS:    CONSTITUTIONAL: No fever, weight loss, chills, shakes, or fatigue  EYES: No eye pain, visual disturbances, or discharge  ENMT:  No difficulty hearing, tinnitus, vertigo; No sinus or throat pain  NECK: No pain or stiffness  BREASTS: No pain, masses, or nipple discharge  RESPIRATORY: No cough, wheezing, hemoptysis, or shortness of breath  CARDIOVASCULAR: No chest pain, dyspnea, palpitations, dizziness, syncope, paroxysmal nocturnal dyspnea, orthopnea, or arm or leg swelling  GASTROINTESTINAL: No abdominal  or epigastric pain, nausea, vomiting, hematemesis, diarrhea, constipation, melena or bright red blood.  GENITOURINARY: No dysuria, nocturia, hematuria, or urinary incontinence  NEUROLOGICAL: No headaches, memory loss, slurred speech, limb weakness, loss of strength, numbness, or tremors  SKIN: No itching, burning, rashes, or lesions   LYMPH NODES: No enlarged glands  ENDOCRINE: No heat or cold intolerance, or hair loss  MUSCULOSKELETAL: No joint pain or swelling, muscle, back, or extremity pain  PSYCHIATRIC: No depression, anxiety, or difficulty sleeping  HEME/LYMPH: No easy bruising or bleeding gums  ALLERY AND IMMUNOLOGIC: No hives or rash.      Vital Signs Last 24 Hrs  T(C): 37.4 (27 Sep 2024 11:34), Max: 37.4 (27 Sep 2024 11:34)  T(F): 99.3 (27 Sep 2024 11:34), Max: 99.3 (27 Sep 2024 11:34)  HR: 121 (27 Sep 2024 11:34) (66 - 130)  BP: 121/63 (27 Sep 2024 11:34) (103/52 - 121/63)  BP(mean): --  RR: 18 (27 Sep 2024 11:34) (18 - 18)  SpO2: 96% (27 Sep 2024 11:34) (93% - 96%)    Parameters below as of 27 Sep 2024 11:34  Patient On (Oxygen Delivery Method): room air      Daily     Daily     09-26 @ 07:01  -  09-27 @ 07:00  --------------------------------------------------------  IN: 190 mL / OUT: 1875 mL / NET: -1685 mL    09-27 @ 07:01 - 09-27 @ 15:16  --------------------------------------------------------  IN: 480 mL / OUT: 450 mL / NET: 30 mL          PHYSICAL EXAM:    GENERAL: In no apparent distress, well nourished, and hydrated.  HEAD:  Atraumatic, Normocephalic  EYES: EOMI, PERRLA, conjunctiva and sclera clear  ENMT: No tonsillar erythema, exudates, or enlargement; Moist mucous membranes, Good dentition, No lesions  NECK: Supple and normal thyroid.  No JVD or carotid bruit.  Carotid pulse is 2+ bilaterally.  HEART: Regular rate and rhythm; No murmurs, rubs, or gallops.  PULMONARY: Clear to auscultation and perfusion.  No rales, wheezing, or rhonchi bilaterally.  ABDOMEN: Soft, Nontender, Nondistended; Bowel sounds present  EXTREMITIES:  2+ Peripheral Pulses, No clubbing, cyanosis, or edema  LYMPH: No lymphadenopathy noted  NEUROLOGICAL: Grossly nonfocal          INTERPRETATION OF TELEMETRY:    ECG:    I&O's Detail    26 Sep 2024 07:01  -  27 Sep 2024 07:00  --------------------------------------------------------  IN:    Oral Fluid: 150 mL    sodium chloride 0.9%: 40 mL  Total IN: 190 mL    OUT:    Voided (mL): 1875 mL  Total OUT: 1875 mL    Total NET: -1685 mL      27 Sep 2024 07:01  -  27 Sep 2024 15:16  --------------------------------------------------------  IN:    Oral Fluid: 480 mL  Total IN: 480 mL    OUT:    Voided (mL): 450 mL  Total OUT: 450 mL    Total NET: 30 mL          LABS:                        12.5   8.88  )-----------( 202      ( 27 Sep 2024 05:42 )             37.4     09-27    139  |  105  |  26[H]  ----------------------------<  140[H]  3.9   |  21[L]  |  1.46[H]    Ca    8.8      27 Sep 2024 05:43  Phos  2.7     09-27  Mg     2.1     09-27    TPro  6.4  /  Alb  3.8  /  TBili  0.9  /  DBili  x   /  AST  15  /  ALT  17  /  AlkPhos  50  09-26          Urinalysis Basic - ( 27 Sep 2024 05:43 )    Color: x / Appearance: x / SG: x / pH: x  Gluc: 140 mg/dL / Ketone: x  / Bili: x / Urobili: x   Blood: x / Protein: x / Nitrite: x   Leuk Esterase: x / RBC: x / WBC x   Sq Epi: x / Non Sq Epi: x / Bacteria: x      BNP  I&O's Detail    26 Sep 2024 07:01  -  27 Sep 2024 07:00  --------------------------------------------------------  IN:    Oral Fluid: 150 mL    sodium chloride 0.9%: 40 mL  Total IN: 190 mL    OUT:    Voided (mL): 1875 mL  Total OUT: 1875 mL    Total NET: -1685 mL      27 Sep 2024 07:01  -  27 Sep 2024 15:16  --------------------------------------------------------  IN:    Oral Fluid: 480 mL  Total IN: 480 mL    OUT:    Voided (mL): 450 mL  Total OUT: 450 mL    Total NET: 30 mL        Daily     Daily     RADIOLOGY & ADDITIONAL STUDIES:

## 2024-09-28 LAB
ANION GAP SERPL CALC-SCNC: 12 MMOL/L — SIGNIFICANT CHANGE UP (ref 5–17)
BUN SERPL-MCNC: 29 MG/DL — HIGH (ref 7–23)
CALCIUM SERPL-MCNC: 8.9 MG/DL — SIGNIFICANT CHANGE UP (ref 8.4–10.5)
CHLORIDE SERPL-SCNC: 105 MMOL/L — SIGNIFICANT CHANGE UP (ref 96–108)
CO2 SERPL-SCNC: 20 MMOL/L — LOW (ref 22–31)
CREAT SERPL-MCNC: 1.56 MG/DL — HIGH (ref 0.5–1.3)
EGFR: 44 ML/MIN/1.73M2 — LOW
GLUCOSE BLDC GLUCOMTR-MCNC: 144 MG/DL — HIGH (ref 70–99)
GLUCOSE BLDC GLUCOMTR-MCNC: 146 MG/DL — HIGH (ref 70–99)
GLUCOSE BLDC GLUCOMTR-MCNC: 153 MG/DL — HIGH (ref 70–99)
GLUCOSE BLDC GLUCOMTR-MCNC: 193 MG/DL — HIGH (ref 70–99)
GLUCOSE SERPL-MCNC: 141 MG/DL — HIGH (ref 70–99)
HCT VFR BLD CALC: 38.4 % — LOW (ref 39–50)
HGB BLD-MCNC: 12.9 G/DL — LOW (ref 13–17)
MCHC RBC-ENTMCNC: 31.7 PG — SIGNIFICANT CHANGE UP (ref 27–34)
MCHC RBC-ENTMCNC: 33.6 GM/DL — SIGNIFICANT CHANGE UP (ref 32–36)
MCV RBC AUTO: 94.3 FL — SIGNIFICANT CHANGE UP (ref 80–100)
NRBC # BLD: 0 /100 WBCS — SIGNIFICANT CHANGE UP (ref 0–0)
PLATELET # BLD AUTO: 205 K/UL — SIGNIFICANT CHANGE UP (ref 150–400)
POTASSIUM SERPL-MCNC: 3.9 MMOL/L — SIGNIFICANT CHANGE UP (ref 3.5–5.3)
POTASSIUM SERPL-SCNC: 3.9 MMOL/L — SIGNIFICANT CHANGE UP (ref 3.5–5.3)
RBC # BLD: 4.07 M/UL — LOW (ref 4.2–5.8)
RBC # FLD: 11.9 % — SIGNIFICANT CHANGE UP (ref 10.3–14.5)
SODIUM SERPL-SCNC: 137 MMOL/L — SIGNIFICANT CHANGE UP (ref 135–145)
WBC # BLD: 9.43 K/UL — SIGNIFICANT CHANGE UP (ref 3.8–10.5)
WBC # FLD AUTO: 9.43 K/UL — SIGNIFICANT CHANGE UP (ref 3.8–10.5)

## 2024-09-28 PROCEDURE — 99232 SBSQ HOSP IP/OBS MODERATE 35: CPT

## 2024-09-28 PROCEDURE — 93010 ELECTROCARDIOGRAM REPORT: CPT

## 2024-09-28 PROCEDURE — 71045 X-RAY EXAM CHEST 1 VIEW: CPT | Mod: 26

## 2024-09-28 RX ORDER — 5-HYDROXYTRYPTOPHAN (5-HTP) 100 MG
3 TABLET,DISINTEGRATING ORAL AT BEDTIME
Refills: 0 | Status: DISCONTINUED | OUTPATIENT
Start: 2024-09-28 | End: 2024-10-03

## 2024-09-28 RX ORDER — MAGNESIUM SULFATE 500 MG/ML
1 VIAL (ML) INJECTION ONCE
Refills: 0 | Status: COMPLETED | OUTPATIENT
Start: 2024-09-28 | End: 2024-09-28

## 2024-09-28 RX ADMIN — Medication 75 MILLIGRAM(S): at 11:57

## 2024-09-28 RX ADMIN — ATORVASTATIN CALCIUM 10 MILLIGRAM(S): 10 TABLET, FILM COATED ORAL at 20:24

## 2024-09-28 RX ADMIN — AMIODARONE HYDROCHLORIDE 400 MILLIGRAM(S): 50 INJECTION, SOLUTION INTRAVENOUS at 05:24

## 2024-09-28 RX ADMIN — Medication 40 MILLIEQUIVALENT(S): at 11:03

## 2024-09-28 RX ADMIN — SPIRONOLACTONE 12.5 MILLIGRAM(S): 100 TABLET, FILM COATED ORAL at 05:24

## 2024-09-28 RX ADMIN — FUROSEMIDE 20 MILLIGRAM(S): 10 INJECTION INTRAVENOUS at 05:24

## 2024-09-28 RX ADMIN — Medication 3 MILLIGRAM(S): at 22:13

## 2024-09-28 RX ADMIN — APIXABAN 2.5 MILLIGRAM(S): 5 TABLET, FILM COATED ORAL at 17:13

## 2024-09-28 RX ADMIN — Medication 2: at 07:53

## 2024-09-28 RX ADMIN — APIXABAN 2.5 MILLIGRAM(S): 5 TABLET, FILM COATED ORAL at 05:24

## 2024-09-28 RX ADMIN — Medication 100 GRAM(S): at 11:03

## 2024-09-28 RX ADMIN — Medication 2: at 11:55

## 2024-09-28 RX ADMIN — CHLORHEXIDINE GLUCONATE ORAL RINSE 1 APPLICATION(S): 1.2 SOLUTION DENTAL at 07:55

## 2024-09-28 RX ADMIN — AMIODARONE HYDROCHLORIDE 400 MILLIGRAM(S): 50 INJECTION, SOLUTION INTRAVENOUS at 11:57

## 2024-09-28 RX ADMIN — AMIODARONE HYDROCHLORIDE 400 MILLIGRAM(S): 50 INJECTION, SOLUTION INTRAVENOUS at 20:06

## 2024-09-28 NOTE — PROVIDER CONTACT NOTE (OTHER) - ASSESSMENT
Patient appears comfortable in recliner. Denies CP, SOB, palpitations. /69. HR 140s. Afib on tele.
Other VSS (see VS flowsheet). Patient with no complaints or concerns at this time.

## 2024-09-28 NOTE — PROVIDER CONTACT NOTE (OTHER) - BACKGROUND
Patient s/p TAVR 9/25. Patient has been in and out of afib. Amioload started 9/27/24.
Patient is s/p TAVR from 9/25/24.

## 2024-09-28 NOTE — PROGRESS NOTE ADULT - SUBJECTIVE AND OBJECTIVE BOX
INTERVAL HPI/OVERNIGHT EVENTS: héctor has been in and out of AFIb /flutter with controlled ventricular response @100 ,alternating with sinus @ 80, no advanced AV block    MEDICATIONS  (STANDING):  aMIOdarone    Tablet 400 milliGRAM(s) Oral every 8 hours  aMIOdarone    Tablet   Oral   apixaban 2.5 milliGRAM(s) Oral every 12 hours  atorvastatin 10 milliGRAM(s) Oral at bedtime  cefuroxime  IVPB 1500 milliGRAM(s) IV Intermittent once  chlorhexidine 2% Cloths 1 Application(s) Topical <User Schedule>  clopidogrel Tablet 75 milliGRAM(s) Oral daily  dextrose 5%. 1000 milliLiter(s) (100 mL/Hr) IV Continuous <Continuous>  dextrose 5%. 1000 milliLiter(s) (50 mL/Hr) IV Continuous <Continuous>  dextrose 50% Injectable 12.5 Gram(s) IV Push once  dextrose 50% Injectable 25 Gram(s) IV Push once  dextrose 50% Injectable 25 Gram(s) IV Push once  furosemide    Tablet 20 milliGRAM(s) Oral daily  glucagon  Injectable 1 milliGRAM(s) IntraMuscular once  influenza  Vaccine (HIGH DOSE) 0.5 milliLiter(s) IntraMuscular once  insulin lispro (ADMELOG) corrective regimen sliding scale   SubCutaneous three times a day before meals  insulin lispro (ADMELOG) corrective regimen sliding scale   SubCutaneous at bedtime  sodium chloride 0.9%. 1000 milliLiter(s) (20 mL/Hr) IV Continuous <Continuous>  spironolactone 12.5 milliGRAM(s) Oral daily    MEDICATIONS  (PRN):  dextrose Oral Gel 15 Gram(s) Oral once PRN Blood Glucose LESS THAN 70 milliGRAM(s)/deciliter      Allergies    codeine (Rash)    Intolerances      ROS:  General: Pt denies recent weight loss/fever/chills    Neurological: denies numbness or  sensation loss    Cardiovascular: denies chest pain/palpitations/leg edema    Respiratory and Thorax: denies SOB/cough/wheezing    Gastrointestinal: denies abdominal pain/diarrhea/constipation/bloody stool    Genitourinary: denies urinary frequency/urgency/ dysuria    Musculoskeletal: denies joint pain or swelling, denies restricted motion    Hematologic: denies abnormal bleeding  	    	  	    		        	    	            Vital Signs Last 24 Hrs  T(C): 36.6 (28 Sep 2024 11:17), Max: 37.1 (28 Sep 2024 04:11)  T(F): 97.8 (28 Sep 2024 11:17), Max: 98.8 (28 Sep 2024 04:11)  HR: 92 (28 Sep 2024 11:17) (85 - 144)  BP: 129/84 (28 Sep 2024 11:17) (116/69 - 144/65)  BP(mean): 84 (28 Sep 2024 09:26) (84 - 84)  RR: 18 (28 Sep 2024 11:17) (18 - 18)  SpO2: 95% (28 Sep 2024 11:17) (92% - 95%)    Parameters below as of 28 Sep 2024 11:17  Patient On (Oxygen Delivery Method): room air      Daily     Daily Weight in k.8 (28 Sep 2024 09:57)     @ 07:  -   @ 07:00  --------------------------------------------------------  IN: 780 mL / OUT: 1350 mL / NET: -570 mL     @ 07:  -   @ 17:58  --------------------------------------------------------  IN: 500 mL / OUT: 400 mL / NET: 100 mL      Physical Exam:    wdwn male  no JVD  cor RRR  lung clear  abd soft   ext no edema      LABS:                        12.9   9.43  )-----------( 205      ( 28 Sep 2024 04:49 )             38.4         137  |  105  |  29[H]  ----------------------------<  141[H]  3.9   |  20[L]  |  1.56[H]    Ca    8.9      28 Sep 2024 04:48  Phos  2.7       Mg     2.1             Urinalysis Basic - ( 28 Sep 2024 04:48 )    Color: x / Appearance: x / SG: x / pH: x  Gluc: 141 mg/dL / Ketone: x  / Bili: x / Urobili: x   Blood: x / Protein: x / Nitrite: x   Leuk Esterase: x / RBC: x / WBC x   Sq Epi: x / Non Sq Epi: x / Bacteria: x        RADIOLOGY & ADDITIONAL TESTS:    TELE:    EKG:

## 2024-09-28 NOTE — PROGRESS NOTE ADULT - ASSESSMENT
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 He underwent cardiac cath on 9/9/24 and had MARGIE to the mid LAD.      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 4 sec conversion pause.  AFIB up to 120s at rest,  started 9/27/28 on amiodarone, remains in AFIB/Flutter alternating with sinus,    advise    1. amiodarone 400 TID x 12 doses , then 200 mg daily  2. check TFTS  3. continue eliquis for now  4. if further bradycardia, or advanced AV block for PPM.       9/25: s/p Right transfemoral TAVR 29mm Evolut Fx(+). Per Structural Heart NP,  pre-op RBBB, intermittent CHB intraoperatively, maintain TVP to external PPM overnight. Patient transferred to SDU, in no acute distress. Monitor groins. Daily EKG. Repeat TTE in AM. Plavix tonight, Eliquis tomorrow if no contraindication per Dr. Heaton.   9/26 PW to be d/c, no pacing overnight.   Will discuss medications with Dr Heaton  9/27 afib 130's  3.4 second pause .  dw EP--will start amio load and observe on tele.  If any further pauses will possible require PPM.

## 2024-09-28 NOTE — PROGRESS NOTE ADULT - ASSESSMENT
85 y/o Male with PMHx significant for HTN, HLD, DM2 (managed with diet, not on any hyperglycemic meds), tachy-chelsea syndrome, baseline RBBB on EKG, Afib (on Eliquis, afib recently dx this past February), remote colon CA (26 years ago s/p colon resection and chemo), FOX/CKD, and spinal stenosis who reports a history of dyspnea on exertion with walking around his home. He underwent cardiac cath on 9/9/24 and had MARGIE to the mid LAD. He is now scheduled for TAVR on 9/25/24. He denies CP, dizziness, syncope, fever or chills.      9/25: s/p Right transfemoral TAVR 29mm Evolut Fx(+). Per Structural Heart NP,  pre-op RBBB, intermittent CHB intraoperatively, maintain TVP to external PPM overnight. Patient transferred to SDU, in no acute distress. Monitor groins. Daily EKG. Repeat TTE in AM. Plavix tonight, Eliquis tomorrow if no contraindication per Dr. Heaton.   9/26 PW to be d/c, no pacing overnight.   Will discuss medications with Dr Heaton  9/27 afib 130's  3.4 second pause .  dw EP--will start amio load and observe on tele.  If any further pauses will possible require PPM.    9/28 Pt converted from SR to aflutter 130;s this afternoon. continue with amiodarone load. No bradycardia of pauses on tele. Replete K and Mg. CXR r/o effusion

## 2024-09-28 NOTE — PROGRESS NOTE ADULT - SUBJECTIVE AND OBJECTIVE BOX
VITAL SIGNS    Telemetry:  AFib -> SR midnight -> aflutter 130's noon  Vital Signs Last 24 Hrs  T(C): 36.6 (24 @ 11:17), Max: 37.1 (24 @ 04:11)  T(F): 97.8 (24 @ 11:17), Max: 98.8 (24 @ 04:11)  HR: 92 (24 @ 11:17) (85 - 144)  BP: 129/84 (24 @ 11:17) (116/69 - 144/65)  RR: 18 (24 @ 11:17) (18 - 18)  SpO2: 95% (24 @ 11:17) (92% - 95%)             @ 07:01  -   @ 07:00  --------------------------------------------------------  IN: 780 mL / OUT: 1350 mL / NET: -570 mL     @ 07:01  -   @ 12:17  --------------------------------------------------------  IN: 200 mL / OUT: 400 mL / NET: -200 mL       Daily     Daily Weight in k.8 (28 Sep 2024 09:57)  Admit Wt: Drug Dosing Weight  Height (cm): 182.9 (25 Sep 2024 10:05)  Weight (kg): 106.5 (25 Sep 2024 10:05)  BMI (kg/m2): 31.8 (25 Sep 2024 10:05)  BSA (m2): 2.28 (25 Sep 2024 10:05)      CAPILLARY BLOOD GLUCOSE      POCT Blood Glucose.: 193 mg/dL (28 Sep 2024 11:42)  POCT Blood Glucose.: 153 mg/dL (28 Sep 2024 07:27)  POCT Blood Glucose.: 154 mg/dL (27 Sep 2024 21:29)  POCT Blood Glucose.: 126 mg/dL (27 Sep 2024 16:35)          MEDICATIONS  aMIOdarone    Tablet   Oral   aMIOdarone    Tablet 400 milliGRAM(s) Oral every 8 hours  apixaban 2.5 milliGRAM(s) Oral every 12 hours  atorvastatin 10 milliGRAM(s) Oral at bedtime  cefuroxime  IVPB 1500 milliGRAM(s) IV Intermittent once  chlorhexidine 2% Cloths 1 Application(s) Topical <User Schedule>  clopidogrel Tablet 75 milliGRAM(s) Oral daily  dextrose 5%. 1000 milliLiter(s) IV Continuous <Continuous>  dextrose 5%. 1000 milliLiter(s) IV Continuous <Continuous>  dextrose 50% Injectable 12.5 Gram(s) IV Push once  dextrose 50% Injectable 25 Gram(s) IV Push once  dextrose 50% Injectable 25 Gram(s) IV Push once  dextrose Oral Gel 15 Gram(s) Oral once PRN  furosemide    Tablet 20 milliGRAM(s) Oral daily  glucagon  Injectable 1 milliGRAM(s) IntraMuscular once  influenza  Vaccine (HIGH DOSE) 0.5 milliLiter(s) IntraMuscular once  insulin lispro (ADMELOG) corrective regimen sliding scale   SubCutaneous at bedtime  insulin lispro (ADMELOG) corrective regimen sliding scale   SubCutaneous three times a day before meals  sodium chloride 0.9%. 1000 milliLiter(s) IV Continuous <Continuous>  spironolactone 12.5 milliGRAM(s) Oral daily      >>> <<<  PHYSICAL EXAM  Subjective: NAD OOB to chair  Neurology: alert and oriented x 3, nonfocal, no gross deficits  CV :s1s2  Lungs: CTA  Abdomen: soft, NT,ND, (+ )BM  :  voiding  Extremities:  -c/c/e     LABS      137  |  105  |  29[H]  ----------------------------<  141[H]  3.9   |  20[L]  |  1.56[H]    Ca    8.9      28 Sep 2024 04:48  Phos  2.7       Mg     2.1                                        12.9   9.43  )-----------( 205      ( 28 Sep 2024 04:49 )             38.4                 PAST MEDICAL & SURGICAL HISTORY:  HTN (hypertension)      HLD (hyperlipidemia)      AF (atrial fibrillation)      Aortic stenosis      Diabetes mellitus      FOX (acute kidney injury)      Chronic kidney disease (CKD)      H/O spinal stenosis      H/O acute heart failure      Atrial fibrillation with RVR      GERD (gastroesophageal reflux disease)      History of BPH      Colon cancer      H/O neuropathy      H/O tachycardia-bradycardia syndrome      History of right bundle branch block (RBBB)      S/P colon resection      History of chemotherapy      Stented coronary artery

## 2024-09-29 LAB
ALBUMIN SERPL ELPH-MCNC: 3.6 G/DL — SIGNIFICANT CHANGE UP (ref 3.3–5)
ALP SERPL-CCNC: 51 U/L — SIGNIFICANT CHANGE UP (ref 40–120)
ALT FLD-CCNC: 15 U/L — SIGNIFICANT CHANGE UP (ref 10–45)
ANION GAP SERPL CALC-SCNC: 13 MMOL/L — SIGNIFICANT CHANGE UP (ref 5–17)
AST SERPL-CCNC: 13 U/L — SIGNIFICANT CHANGE UP (ref 10–40)
BILIRUB SERPL-MCNC: 1 MG/DL — SIGNIFICANT CHANGE UP (ref 0.2–1.2)
BUN SERPL-MCNC: 31 MG/DL — HIGH (ref 7–23)
CALCIUM SERPL-MCNC: 9 MG/DL — SIGNIFICANT CHANGE UP (ref 8.4–10.5)
CHLORIDE SERPL-SCNC: 103 MMOL/L — SIGNIFICANT CHANGE UP (ref 96–108)
CO2 SERPL-SCNC: 20 MMOL/L — LOW (ref 22–31)
CREAT SERPL-MCNC: 1.66 MG/DL — HIGH (ref 0.5–1.3)
EGFR: 41 ML/MIN/1.73M2 — LOW
GLUCOSE BLDC GLUCOMTR-MCNC: 126 MG/DL — HIGH (ref 70–99)
GLUCOSE BLDC GLUCOMTR-MCNC: 147 MG/DL — HIGH (ref 70–99)
GLUCOSE BLDC GLUCOMTR-MCNC: 156 MG/DL — HIGH (ref 70–99)
GLUCOSE BLDC GLUCOMTR-MCNC: 226 MG/DL — HIGH (ref 70–99)
GLUCOSE SERPL-MCNC: 148 MG/DL — HIGH (ref 70–99)
HCT VFR BLD CALC: 37.1 % — LOW (ref 39–50)
HGB BLD-MCNC: 12.8 G/DL — LOW (ref 13–17)
MCHC RBC-ENTMCNC: 32.3 PG — SIGNIFICANT CHANGE UP (ref 27–34)
MCHC RBC-ENTMCNC: 34.5 GM/DL — SIGNIFICANT CHANGE UP (ref 32–36)
MCV RBC AUTO: 93.7 FL — SIGNIFICANT CHANGE UP (ref 80–100)
NRBC # BLD: 0 /100 WBCS — SIGNIFICANT CHANGE UP (ref 0–0)
PLATELET # BLD AUTO: 214 K/UL — SIGNIFICANT CHANGE UP (ref 150–400)
POTASSIUM SERPL-MCNC: 4.2 MMOL/L — SIGNIFICANT CHANGE UP (ref 3.5–5.3)
POTASSIUM SERPL-SCNC: 4.2 MMOL/L — SIGNIFICANT CHANGE UP (ref 3.5–5.3)
PROT SERPL-MCNC: 6.4 G/DL — SIGNIFICANT CHANGE UP (ref 6–8.3)
RBC # BLD: 3.96 M/UL — LOW (ref 4.2–5.8)
RBC # FLD: 11.7 % — SIGNIFICANT CHANGE UP (ref 10.3–14.5)
SODIUM SERPL-SCNC: 136 MMOL/L — SIGNIFICANT CHANGE UP (ref 135–145)
T3 SERPL-MCNC: 76 NG/DL — LOW (ref 80–200)
T4 AB SER-ACNC: 7.2 UG/DL — SIGNIFICANT CHANGE UP (ref 4.6–12)
TSH SERPL-MCNC: 2.49 UIU/ML — SIGNIFICANT CHANGE UP (ref 0.27–4.2)
WBC # BLD: 7.91 K/UL — SIGNIFICANT CHANGE UP (ref 3.8–10.5)
WBC # FLD AUTO: 7.91 K/UL — SIGNIFICANT CHANGE UP (ref 3.8–10.5)

## 2024-09-29 PROCEDURE — 93010 ELECTROCARDIOGRAM REPORT: CPT

## 2024-09-29 PROCEDURE — 99232 SBSQ HOSP IP/OBS MODERATE 35: CPT

## 2024-09-29 RX ADMIN — AMIODARONE HYDROCHLORIDE 400 MILLIGRAM(S): 50 INJECTION, SOLUTION INTRAVENOUS at 22:16

## 2024-09-29 RX ADMIN — SPIRONOLACTONE 12.5 MILLIGRAM(S): 100 TABLET, FILM COATED ORAL at 05:03

## 2024-09-29 RX ADMIN — APIXABAN 2.5 MILLIGRAM(S): 5 TABLET, FILM COATED ORAL at 05:03

## 2024-09-29 RX ADMIN — FUROSEMIDE 20 MILLIGRAM(S): 10 INJECTION INTRAVENOUS at 05:03

## 2024-09-29 RX ADMIN — ATORVASTATIN CALCIUM 10 MILLIGRAM(S): 10 TABLET, FILM COATED ORAL at 22:16

## 2024-09-29 RX ADMIN — Medication 75 MILLIGRAM(S): at 11:28

## 2024-09-29 RX ADMIN — APIXABAN 2.5 MILLIGRAM(S): 5 TABLET, FILM COATED ORAL at 16:51

## 2024-09-29 RX ADMIN — Medication 4: at 11:29

## 2024-09-29 RX ADMIN — CHLORHEXIDINE GLUCONATE ORAL RINSE 1 APPLICATION(S): 1.2 SOLUTION DENTAL at 08:21

## 2024-09-29 RX ADMIN — AMIODARONE HYDROCHLORIDE 400 MILLIGRAM(S): 50 INJECTION, SOLUTION INTRAVENOUS at 05:03

## 2024-09-29 RX ADMIN — AMIODARONE HYDROCHLORIDE 400 MILLIGRAM(S): 50 INJECTION, SOLUTION INTRAVENOUS at 13:32

## 2024-09-29 NOTE — PROGRESS NOTE ADULT - ASSESSMENT
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 He underwent cardiac cath on 9/9/24 and had MARGIE to the mid LAD.      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 4 sec conversion pause.  AFIB up to 120s at rest,  started 9/27/28 on amiodarone, remains in AFIB/Flutter alternating with sinus,    HE has now been on amiodarone loading X 2 days, tolerting this with AFIb @100-110 alternating with NSR , no pauses    advise    1. cotninue  amiodarone 400 TID x 12 doses , then 200 mg daily  2. check TFTS  3. continue eliquis for now  4. if further bradycardia, or advanced AV block for PPM.   5/ if remains asymptomatic, will f u as OPT, MCOT patch in place

## 2024-09-29 NOTE — PROGRESS NOTE ADULT - PROBLEM SELECTOR PLAN 2
hold BB and Cardizem  c/w amiodarone  zoll pads placed observe for bradycardia and av block  EP following--Dr Garcia

## 2024-09-29 NOTE — PROGRESS NOTE ADULT - SUBJECTIVE AND OBJECTIVE BOX
VITAL SIGNS    Telemetry:  SR 70's <-> afib 100's  Vital Signs Last 24 Hrs  T(C): 36.6 (24 @ 11:16), Max: 36.9 (24 @ 04:35)  T(F): 97.9 (24 @ 11:16), Max: 98.4 (24 @ 04:35)  HR: 94 (24 @ 11:16) (76 - 112)  BP: 111/65 (24 @ 11:16) (111/65 - 157/83)  RR: 18 (24 @ 11:16) (18 - 18)  SpO2: 96% (24 @ 11:16) (93% - 96%)             @ 07:01  -   @ 07:00  --------------------------------------------------------  IN: 980 mL / OUT: 2075 mL / NET: -1095 mL     @ 07:01  -   @ 12:52  --------------------------------------------------------  IN: 240 mL / OUT: 625 mL / NET: -385 mL       Daily     Daily Weight in k.9 (29 Sep 2024 08:02)  Admit Wt: Drug Dosing Weight  Height (cm): 182.9 (25 Sep 2024 10:05)  Weight (kg): 106.5 (25 Sep 2024 10:05)  BMI (kg/m2): 31.8 (25 Sep 2024 10:05)  BSA (m2): 2.28 (25 Sep 2024 10:05)    Bilirubin Total: 1.0 mg/dL ( @ 05:44)    CAPILLARY BLOOD GLUCOSE      POCT Blood Glucose.: 226 mg/dL (29 Sep 2024 11:13)  POCT Blood Glucose.: 147 mg/dL (29 Sep 2024 07:17)  POCT Blood Glucose.: 146 mg/dL (28 Sep 2024 21:33)  POCT Blood Glucose.: 144 mg/dL (28 Sep 2024 16:43)          MEDICATIONS  aMIOdarone    Tablet   Oral   aMIOdarone    Tablet 400 milliGRAM(s) Oral every 8 hours  apixaban 2.5 milliGRAM(s) Oral every 12 hours  atorvastatin 10 milliGRAM(s) Oral at bedtime  chlorhexidine 2% Cloths 1 Application(s) Topical <User Schedule>  clopidogrel Tablet 75 milliGRAM(s) Oral daily  dextrose 5%. 1000 milliLiter(s) IV Continuous <Continuous>  dextrose 5%. 1000 milliLiter(s) IV Continuous <Continuous>  dextrose 50% Injectable 12.5 Gram(s) IV Push once  dextrose 50% Injectable 25 Gram(s) IV Push once  dextrose 50% Injectable 25 Gram(s) IV Push once  dextrose Oral Gel 15 Gram(s) Oral once PRN  furosemide    Tablet 20 milliGRAM(s) Oral daily  glucagon  Injectable 1 milliGRAM(s) IntraMuscular once  influenza  Vaccine (HIGH DOSE) 0.5 milliLiter(s) IntraMuscular once  insulin lispro (ADMELOG) corrective regimen sliding scale   SubCutaneous three times a day before meals  insulin lispro (ADMELOG) corrective regimen sliding scale   SubCutaneous at bedtime  melatonin 3 milliGRAM(s) Oral at bedtime PRN  sodium chloride 0.9%. 1000 milliLiter(s) IV Continuous <Continuous>  spironolactone 12.5 milliGRAM(s) Oral daily      >>> <<<  PHYSICAL EXAM  Subjective: NAD  Neurology: alert and oriented x 3, nonfocal, no gross deficits  CV : s1s2  Sternal Wound :  CDI , Stable  Lungs:cta  Abdomen: soft, NT,ND, (+ )BM  :  voiding  Extremities: -c/c/e      LABS      136  |  103  |  31[H]  ----------------------------<  148[H]  4.2   |  20[L]  |  1.66[H]    Ca    9.0      29 Sep 2024 05:44    TPro  6.4  /  Alb  3.6  /  TBili  1.0  /  DBili  x   /  AST  13  /  ALT  15  /  AlkPhos  51                                   12.8   7.91  )-----------( 214      ( 29 Sep 2024 05:44 )             37.1                 PAST MEDICAL & SURGICAL HISTORY:  HTN (hypertension)      HLD (hyperlipidemia)      AF (atrial fibrillation)      Aortic stenosis      Diabetes mellitus      FOX (acute kidney injury)      Chronic kidney disease (CKD)      H/O spinal stenosis      H/O acute heart failure      Atrial fibrillation with RVR      GERD (gastroesophageal reflux disease)      History of BPH      Colon cancer      H/O neuropathy      H/O tachycardia-bradycardia syndrome      History of right bundle branch block (RBBB)      S/P colon resection      History of chemotherapy      Stented coronary artery            Patient

## 2024-09-29 NOTE — PROGRESS NOTE ADULT - SUBJECTIVE AND OBJECTIVE BOX
INTERVAL HPI/OVERNIGHT EVENTS: patient improved , no chest pain no dyspnea    MEDICATIONS  (STANDING):  aMIOdarone    Tablet   Oral   aMIOdarone    Tablet 400 milliGRAM(s) Oral every 8 hours  apixaban 2.5 milliGRAM(s) Oral every 12 hours  atorvastatin 10 milliGRAM(s) Oral at bedtime  chlorhexidine 2% Cloths 1 Application(s) Topical <User Schedule>  clopidogrel Tablet 75 milliGRAM(s) Oral daily  dextrose 5%. 1000 milliLiter(s) (50 mL/Hr) IV Continuous <Continuous>  dextrose 5%. 1000 milliLiter(s) (100 mL/Hr) IV Continuous <Continuous>  dextrose 50% Injectable 12.5 Gram(s) IV Push once  dextrose 50% Injectable 25 Gram(s) IV Push once  dextrose 50% Injectable 25 Gram(s) IV Push once  furosemide    Tablet 20 milliGRAM(s) Oral daily  glucagon  Injectable 1 milliGRAM(s) IntraMuscular once  influenza  Vaccine (HIGH DOSE) 0.5 milliLiter(s) IntraMuscular once  insulin lispro (ADMELOG) corrective regimen sliding scale   SubCutaneous at bedtime  insulin lispro (ADMELOG) corrective regimen sliding scale   SubCutaneous three times a day before meals  sodium chloride 0.9%. 1000 milliLiter(s) (20 mL/Hr) IV Continuous <Continuous>  spironolactone 12.5 milliGRAM(s) Oral daily    MEDICATIONS  (PRN):  dextrose Oral Gel 15 Gram(s) Oral once PRN Blood Glucose LESS THAN 70 milliGRAM(s)/deciliter  melatonin 3 milliGRAM(s) Oral at bedtime PRN Sleep      Allergies    codeine (Rash)    Intolerances      ROS:  General: Pt denies recent weight loss/fever/chills    Neurological: denies numbness or  sensation loss    Cardiovascular: denies chest pain/palpitations/leg edema    Respiratory and Thorax: denies SOB/cough/wheezing    Gastrointestinal: denies abdominal pain/diarrhea/constipation/bloody stool    Genitourinary: denies urinary frequency/urgency/ dysuria    Musculoskeletal: denies joint pain or swelling, denies restricted motion    Hematologic: denies abnormal bleeding  	    	  	    		        	    	            Vital Signs Last 24 Hrs  T(C): 36.6 (29 Sep 2024 11:16), Max: 36.9 (29 Sep 2024 04:35)  T(F): 97.9 (29 Sep 2024 11:16), Max: 98.4 (29 Sep 2024 04:35)  HR: 94 (29 Sep 2024 11:16) (76 - 112)  BP: 111/65 (29 Sep 2024 11:16) (111/65 - 157/83)  BP(mean): --  RR: 18 (29 Sep 2024 11:16) (18 - 18)  SpO2: 96% (29 Sep 2024 11:16) (93% - 96%)    Parameters below as of 29 Sep 2024 11:16  Patient On (Oxygen Delivery Method): room air      Daily     Daily Weight in k.9 (29 Sep 2024 08:02)     @ 07:  -   @ 07:00  --------------------------------------------------------  IN: 980 mL / OUT: 2075 mL / NET: -1095 mL     @ 07: @ 16:46  --------------------------------------------------------  IN: 440 mL / OUT: 925 mL / NET: -485 mL      Physical Exam:    wdwn male  noJVD  cor RRR  lung clear  abd soft       LABS:                        12.8   7.91  )-----------( 214      ( 29 Sep 2024 05:44 )             37.1         136  |  103  |  31[H]  ----------------------------<  148[H]  4.2   |  20[L]  |  1.66[H]    Ca    9.0      29 Sep 2024 05:44    TPro  6.4  /  Alb  3.6  /  TBili  1.0  /  DBili  x   /  AST  13  /  ALT  15  /  AlkPhos  51        Urinalysis Basic - ( 29 Sep 2024 05:44 )    Color: x / Appearance: x / SG: x / pH: x  Gluc: 148 mg/dL / Ketone: x  / Bili: x / Urobili: x   Blood: x / Protein: x / Nitrite: x   Leuk Esterase: x / RBC: x / WBC x   Sq Epi: x / Non Sq Epi: x / Bacteria: x        RADIOLOGY & ADDITIONAL TESTS:    TELE:    EKG:

## 2024-09-29 NOTE — PROGRESS NOTE ADULT - ASSESSMENT
83 y/o Male with PMHx significant for HTN, HLD, DM2 (managed with diet, not on any hyperglycemic meds), tachy-chelsea syndrome, baseline RBBB on EKG, Afib (on Eliquis, afib recently dx this past February), remote colon CA (26 years ago s/p colon resection and chemo), FOX/CKD, and spinal stenosis who reports a history of dyspnea on exertion with walking around his home. He underwent cardiac cath on 9/9/24 and had MARGIE to the mid LAD. He is now scheduled for TAVR on 9/25/24. He denies CP, dizziness, syncope, fever or chills.      9/25: s/p Right transfemoral TAVR 29mm Evolut Fx(+). Per Structural Heart NP,  pre-op RBBB, intermittent CHB intraoperatively, maintain TVP to external PPM overnight. Patient transferred to SDU, in no acute distress. Monitor groins. Daily EKG. Repeat TTE in AM. Plavix tonight, Eliquis tomorrow if no contraindication per Dr. Heaton.   9/26 PW to be d/c, no pacing overnight.   Will discuss medications with Dr Heaton  9/27 afib 130's  3.4 second pause .  dw EP--will start amio load and observe on tele.  If any further pauses will possible require PPM.    9/28 Pt converted from SR to aflutter 130;s this afternoon. continue with amiodarone load. No bradycardia of pauses on tele. Replete K and Mg. CXR r/o effusion  9/29 CXR clear, No bradycardia or AV block on tele. Anticipate discharge tomorrow

## 2024-09-30 LAB
ALBUMIN SERPL ELPH-MCNC: 3.9 G/DL — SIGNIFICANT CHANGE UP (ref 3.3–5)
ALP SERPL-CCNC: 56 U/L — SIGNIFICANT CHANGE UP (ref 40–120)
ALT FLD-CCNC: 21 U/L — SIGNIFICANT CHANGE UP (ref 10–45)
ANION GAP SERPL CALC-SCNC: 13 MMOL/L — SIGNIFICANT CHANGE UP (ref 5–17)
ANION GAP SERPL CALC-SCNC: 14 MMOL/L — SIGNIFICANT CHANGE UP (ref 5–17)
AST SERPL-CCNC: 25 U/L — SIGNIFICANT CHANGE UP (ref 10–40)
BILIRUB SERPL-MCNC: 1 MG/DL — SIGNIFICANT CHANGE UP (ref 0.2–1.2)
BUN SERPL-MCNC: 34 MG/DL — HIGH (ref 7–23)
BUN SERPL-MCNC: 36 MG/DL — HIGH (ref 7–23)
CALCIUM SERPL-MCNC: 9.6 MG/DL — SIGNIFICANT CHANGE UP (ref 8.4–10.5)
CALCIUM SERPL-MCNC: 9.7 MG/DL — SIGNIFICANT CHANGE UP (ref 8.4–10.5)
CHLORIDE SERPL-SCNC: 102 MMOL/L — SIGNIFICANT CHANGE UP (ref 96–108)
CHLORIDE SERPL-SCNC: 98 MMOL/L — SIGNIFICANT CHANGE UP (ref 96–108)
CO2 SERPL-SCNC: 22 MMOL/L — SIGNIFICANT CHANGE UP (ref 22–31)
CO2 SERPL-SCNC: 23 MMOL/L — SIGNIFICANT CHANGE UP (ref 22–31)
CREAT SERPL-MCNC: 1.78 MG/DL — HIGH (ref 0.5–1.3)
CREAT SERPL-MCNC: 1.93 MG/DL — HIGH (ref 0.5–1.3)
EGFR: 34 ML/MIN/1.73M2 — LOW
EGFR: 37 ML/MIN/1.73M2 — LOW
GLUCOSE BLDC GLUCOMTR-MCNC: 138 MG/DL — HIGH (ref 70–99)
GLUCOSE BLDC GLUCOMTR-MCNC: 151 MG/DL — HIGH (ref 70–99)
GLUCOSE BLDC GLUCOMTR-MCNC: 176 MG/DL — HIGH (ref 70–99)
GLUCOSE BLDC GLUCOMTR-MCNC: 88 MG/DL — SIGNIFICANT CHANGE UP (ref 70–99)
GLUCOSE SERPL-MCNC: 115 MG/DL — HIGH (ref 70–99)
GLUCOSE SERPL-MCNC: 156 MG/DL — HIGH (ref 70–99)
HCT VFR BLD CALC: 40.2 % — SIGNIFICANT CHANGE UP (ref 39–50)
HGB BLD-MCNC: 13.4 G/DL — SIGNIFICANT CHANGE UP (ref 13–17)
MAGNESIUM SERPL-MCNC: 2.2 MG/DL — SIGNIFICANT CHANGE UP (ref 1.6–2.6)
MCHC RBC-ENTMCNC: 31.5 PG — SIGNIFICANT CHANGE UP (ref 27–34)
MCHC RBC-ENTMCNC: 33.3 GM/DL — SIGNIFICANT CHANGE UP (ref 32–36)
MCV RBC AUTO: 94.6 FL — SIGNIFICANT CHANGE UP (ref 80–100)
NRBC # BLD: 0 /100 WBCS — SIGNIFICANT CHANGE UP (ref 0–0)
PHOSPHATE SERPL-MCNC: 3.9 MG/DL — SIGNIFICANT CHANGE UP (ref 2.5–4.5)
PLATELET # BLD AUTO: 249 K/UL — SIGNIFICANT CHANGE UP (ref 150–400)
POTASSIUM SERPL-MCNC: 4.7 MMOL/L — SIGNIFICANT CHANGE UP (ref 3.5–5.3)
POTASSIUM SERPL-MCNC: 5.5 MMOL/L — HIGH (ref 3.5–5.3)
POTASSIUM SERPL-SCNC: 4.7 MMOL/L — SIGNIFICANT CHANGE UP (ref 3.5–5.3)
POTASSIUM SERPL-SCNC: 5.5 MMOL/L — HIGH (ref 3.5–5.3)
PROT SERPL-MCNC: 7 G/DL — SIGNIFICANT CHANGE UP (ref 6–8.3)
RBC # BLD: 4.25 M/UL — SIGNIFICANT CHANGE UP (ref 4.2–5.8)
RBC # FLD: 11.8 % — SIGNIFICANT CHANGE UP (ref 10.3–14.5)
SODIUM SERPL-SCNC: 135 MMOL/L — SIGNIFICANT CHANGE UP (ref 135–145)
SODIUM SERPL-SCNC: 137 MMOL/L — SIGNIFICANT CHANGE UP (ref 135–145)
WBC # BLD: 7.95 K/UL — SIGNIFICANT CHANGE UP (ref 3.8–10.5)
WBC # FLD AUTO: 7.95 K/UL — SIGNIFICANT CHANGE UP (ref 3.8–10.5)

## 2024-09-30 PROCEDURE — 99232 SBSQ HOSP IP/OBS MODERATE 35: CPT

## 2024-09-30 PROCEDURE — 93010 ELECTROCARDIOGRAM REPORT: CPT | Mod: 76

## 2024-09-30 RX ORDER — METOPROLOL TARTRATE 50 MG
12.5 TABLET ORAL EVERY 12 HOURS
Refills: 0 | Status: DISCONTINUED | OUTPATIENT
Start: 2024-09-30 | End: 2024-10-01

## 2024-09-30 RX ADMIN — Medication 2: at 07:50

## 2024-09-30 RX ADMIN — Medication 2: at 12:04

## 2024-09-30 RX ADMIN — SPIRONOLACTONE 12.5 MILLIGRAM(S): 100 TABLET, FILM COATED ORAL at 05:11

## 2024-09-30 RX ADMIN — FUROSEMIDE 20 MILLIGRAM(S): 10 INJECTION INTRAVENOUS at 05:11

## 2024-09-30 RX ADMIN — APIXABAN 2.5 MILLIGRAM(S): 5 TABLET, FILM COATED ORAL at 05:11

## 2024-09-30 RX ADMIN — ATORVASTATIN CALCIUM 10 MILLIGRAM(S): 10 TABLET, FILM COATED ORAL at 21:02

## 2024-09-30 RX ADMIN — CHLORHEXIDINE GLUCONATE ORAL RINSE 1 APPLICATION(S): 1.2 SOLUTION DENTAL at 17:52

## 2024-09-30 RX ADMIN — APIXABAN 2.5 MILLIGRAM(S): 5 TABLET, FILM COATED ORAL at 17:50

## 2024-09-30 RX ADMIN — AMIODARONE HYDROCHLORIDE 400 MILLIGRAM(S): 50 INJECTION, SOLUTION INTRAVENOUS at 14:00

## 2024-09-30 RX ADMIN — AMIODARONE HYDROCHLORIDE 400 MILLIGRAM(S): 50 INJECTION, SOLUTION INTRAVENOUS at 05:11

## 2024-09-30 RX ADMIN — AMIODARONE HYDROCHLORIDE 400 MILLIGRAM(S): 50 INJECTION, SOLUTION INTRAVENOUS at 21:02

## 2024-09-30 RX ADMIN — Medication 75 MILLIGRAM(S): at 12:05

## 2024-09-30 RX ADMIN — Medication 12.5 MILLIGRAM(S): at 22:13

## 2024-09-30 NOTE — PROGRESS NOTE ADULT - SUBJECTIVE AND OBJECTIVE BOX
VITAL SIGNS    Telemetry:      Vital Signs Last 24 Hrs  T(C): 36.6 (24 @ 04:30), Max: 36.6 (24 @ 11:16)  T(F): 97.8 (24 @ 04:30), Max: 97.9 (24 @ 11:16)  HR: 108 (24 @ 04:30) (94 - 108)  BP: 127/80 (24 @ 04:30) (111/65 - 144/85)  RR: 18 (24 @ 04:30) (18 - 18)  SpO2: 95% (24 @ 04:30) (91% - 96%)                   Daily     Daily Weight in k.9 (29 Sep 2024 08:02)      Bilirubin Total: 1.0 mg/dL ( @ 06:04)    CAPILLARY BLOOD GLUCOSE      POCT Blood Glucose.: 156 mg/dL (29 Sep 2024 22:12)  POCT Blood Glucose.: 126 mg/dL (29 Sep 2024 16:38)  POCT Blood Glucose.: 226 mg/dL (29 Sep 2024 11:13)  POCT Blood Glucose.: 147 mg/dL (29 Sep 2024 07:17)          Drains:     MS         [  ] Drainage:                 L Pleural  [  ]  Drainage:                R Pleural  [  ]  Drainage:    Pacing Wires        [  ]   Settings:                                  Isolated  [  ]    Coumadin    [ ] YES          [  ]      NO         Reason:                         PHYSICAL EXAM    Neurology: alert and oriented x 3, moves all extremities with no defecits  CV :  RRR  Sternal Wound :  CDI , Stable  Lungs:   CTA B/L  Abdomen: soft, nontender, nondistended, positive bowel sounds, last bowel movement   Extremities:                                            VITAL SIGNS    Telemetry:    afib   92    Vital Signs Last 24 Hrs  T(C): 36.6 (24 @ 04:30), Max: 36.6 (24 @ 11:16)  T(F): 97.8 (24 @ 04:30), Max: 97.9 (24 @ 11:16)  HR: 108 (24 @ 04:30) (94 - 108)  BP: 127/80 (24 @ 04:30) (111/65 - 144/85)  RR: 18 (24 @ 04:30) (18 - 18)  SpO2: 95% (24 @ 04:30) (91% - 96%)                   Daily     Daily Weight in k.9 (29 Sep 2024 08:02)      Bilirubin Total: 1.0 mg/dL ( @ 06:04)    CAPILLARY BLOOD GLUCOSE      POCT Blood Glucose.: 156 mg/dL (29 Sep 2024 22:12)  POCT Blood Glucose.: 126 mg/dL (29 Sep 2024 16:38)  POCT Blood Glucose.: 226 mg/dL (29 Sep 2024 11:13)  POCT Blood Glucose.: 147 mg/dL (29 Sep 2024 07:17)                              PHYSICAL EXAM  s   No SOB  No CP"  Neurology: alert and oriented x 3, moves all extremities with no defecits  CV :  RRr  Lungs:   CTA B/L  Abdomen: soft, nontender, nondistended, positive bowel sounds, last bowel movement    Extremities:       no edema    rt groin eccymsis    non tender

## 2024-09-30 NOTE — PHYSICAL THERAPY INITIAL EVALUATION ADULT - PERTINENT HX OF CURRENT PROBLEM, REHAB EVAL
Pt is 85 y/o M with PMHx significant for HTN, HLD, DM2 (managed with diet, not on any hyperglycemic meds), tachy-chelsea syndrome, baseline RBBB on EKG, Afib (on Eliquis, afib recently dx this past February), remote colon CA (26 years ago s/p colon resection and chemo), FOX/CKD, and spinal stenosis who reports a history of dyspnea on exertion with walking around his home. He underwent cardiac cath on 9/9/24 and had MARGIE to the mid LAD. He is now scheduled for TAVR on 9/25/24. He denies CP, dizziness, syncope, fever or chills. Pt is 85 y/o M with PMHx significant for HTN, HLD, DM2 (managed with diet, not on any hyperglycemic meds), tachy-chelsea syndrome, baseline RBBB on EKG, Afib (on Eliquis, afib recently dx this past February), remote colon CA (26 years ago s/p colon resection and chemo), FOX/CKD, and spinal stenosis who reports a history of dyspnea on exertion with walking around his home. He underwent cardiac cath on 9/9/24 and had MARGIE to the mid LAD. He is now s/p TAVR on 9/25/24.

## 2024-09-30 NOTE — PROGRESS NOTE ADULT - SUBJECTIVE AND OBJECTIVE BOX
INTERVAL HPI/OVERNIGHT EVENTS: patient  notably tachycardic    MEDICATIONS  (STANDING):  aMIOdarone    Tablet   Oral   apixaban 2.5 milliGRAM(s) Oral every 12 hours  atorvastatin 10 milliGRAM(s) Oral at bedtime  chlorhexidine 2% Cloths 1 Application(s) Topical <User Schedule>  clopidogrel Tablet 75 milliGRAM(s) Oral daily  dextrose 5%. 1000 milliLiter(s) (50 mL/Hr) IV Continuous <Continuous>  dextrose 5%. 1000 milliLiter(s) (100 mL/Hr) IV Continuous <Continuous>  dextrose 50% Injectable 12.5 Gram(s) IV Push once  dextrose 50% Injectable 25 Gram(s) IV Push once  dextrose 50% Injectable 25 Gram(s) IV Push once  glucagon  Injectable 1 milliGRAM(s) IntraMuscular once  influenza  Vaccine (HIGH DOSE) 0.5 milliLiter(s) IntraMuscular once  insulin lispro (ADMELOG) corrective regimen sliding scale   SubCutaneous at bedtime  insulin lispro (ADMELOG) corrective regimen sliding scale   SubCutaneous three times a day before meals  sodium chloride 0.9%. 1000 milliLiter(s) (20 mL/Hr) IV Continuous <Continuous>    MEDICATIONS  (PRN):  dextrose Oral Gel 15 Gram(s) Oral once PRN Blood Glucose LESS THAN 70 milliGRAM(s)/deciliter  melatonin 3 milliGRAM(s) Oral at bedtime PRN Sleep      Allergies    codeine (Rash)    Intolerances      ROS:  General: Pt denies recent weight loss/fever/chills    Neurological: denies numbness or  sensation loss    Cardiovascular: denies chest pain/palpitations/leg edema    Respiratory and Thorax: denies SOB/cough/wheezing    Gastrointestinal: denies abdominal pain/diarrhea/constipation/bloody stool    Genitourinary: denies urinary frequency/urgency/ dysuria    Musculoskeletal: denies joint pain or swelling, denies restricted motion    Hematologic: denies abnormal bleeding  	    	  	    		        	    	            Vital Signs Last 24 Hrs  T(C): 36.4 (30 Sep 2024 19:43), Max: 36.6 (30 Sep 2024 04:30)  T(F): 97.6 (30 Sep 2024 19:43), Max: 97.9 (30 Sep 2024 12:30)  HR: 113 (30 Sep 2024 19:43) (91 - 113)  BP: 145/90 (30 Sep 2024 19:43) (110/54 - 145/90)  BP(mean): --  RR: 18 (30 Sep 2024 19:43) (18 - 18)  SpO2: 97% (30 Sep 2024 19:43) (95% - 97%)    Parameters below as of 30 Sep 2024 19:43  Patient On (Oxygen Delivery Method): room air      Daily     Daily Weight in k.9 (30 Sep 2024 08:03)     @ 07:01  -   @ 07:00  --------------------------------------------------------  IN: 920 mL / OUT: 1900 mL / NET: -980 mL     @ 07:01  -   @ 21:10  --------------------------------------------------------  IN: 400 mL / OUT: 750 mL / NET: -350 mL      Physical Exam:    wdwn male  no JVD  cor irregular irregular  lung clear  abd soft   ext no edema      LABS:                        13.4   7.95  )-----------( 249      ( 30 Sep 2024 06:03 )             40.2     30    135  |  98  |  36[H]  ----------------------------<  115[H]  4.7   |  23  |  1.93[H]    Ca    9.7      30 Sep 2024 15:27  Phos  3.9       Mg     2.2         TPro  7.0  /  Alb  3.9  /  TBili  1.0  /  DBili  x   /  AST  25  /  ALT  21  /  AlkPhos  56        Urinalysis Basic - ( 30 Sep 2024 15:27 )    Color: x / Appearance: x / SG: x / pH: x  Gluc: 115 mg/dL / Ketone: x  / Bili: x / Urobili: x   Blood: x / Protein: x / Nitrite: x   Leuk Esterase: x / RBC: x / WBC x   Sq Epi: x / Non Sq Epi: x / Bacteria: x        RADIOLOGY & ADDITIONAL TESTS:    TELE:    EKG:

## 2024-09-30 NOTE — PROGRESS NOTE ADULT - ASSESSMENT
85 y/o Male with PMHx significant for HTN, HLD, DM2 (managed with diet, not on any hyperglycemic meds), tachy-chelsea syndrome, baseline RBBB on EKG, Afib (on Eliquis, afib recently dx this past February), remote colon CA (26 years ago s/p colon resection and chemo), FOX/CKD, and spinal stenosis who reports a history of dyspnea on exertion with walking around his home. He underwent cardiac cath on 9/9/24 and had MARGIE to the mid LAD. He is now scheduled for TAVR on 9/25/24. He denies CP, dizziness, syncope, fever or chills.      9/25: s/p Right transfemoral TAVR 29mm Evolut Fx(+). Per [facility] NP,  pre-op RBBB, intermittent CHB intraoperatively, maintain TVP to external PPM overnight. Patient transferred to SDU, in no acute distress. Monitor groins. Daily EKG. Repeat TTE in AM. Plavix tonight, Eliquis tomorrow if no contraindication per Dr. Heaton.   9/26 PW to be d/c, no pacing overnight.   Will discuss medications with Dr Heaton  9/27 afib 130's  3.4 second pause .  dw EP--will start amio load and observe on tele.  If any further pauses will possible require PPM.    9/28 Pt converted from SR to aflutter 130;s this afternoon. continue with amiodarone load. No bradycardia of pauses on tele. Replete K and Mg. CXR r/o effusion  9/29 CXR clear, No bradycardia or AV block on tele. Anticipate discharge tomorrow 83 y/o Male with PMHx significant for HTN, HLD, DM2 (managed with diet, not on any hyperglycemic meds), tachy-chelsea syndrome, baseline RBBB on EKG, Afib (on Eliquis, afib recently dx this past February), remote colon CA (26 years ago s/p colon resection and chemo), FOX/CKD, and spinal stenosis who reports a history of dyspnea on exertion with walking around his home. He underwent cardiac cath on 9/9/24 and had MARGIE to the mid LAD. He is now scheduled for TAVR on 9/25/24. He denies CP, dizziness, syncope, fever or chills.      9/25: s/p Right transfemoral TAVR 29mm Evolut Fx(+). Per [facility] NP,  pre-op RBBB, intermittent CHB intraoperatively, maintain TVP to external PPM overnight. Patient transferred to SDU, in no acute distress. Monitor groins. Daily EKG. Repeat TTE in AM. Plavix tonight, Eliquis tomorrow if no contraindication per Dr. Heaton.   9/26 PW to be d/c, no pacing overnight.   Will discuss medications with Dr Heaton  9/27 afib 130's  3.4 second pause .  dw EP--will start amio load and observe on tele.  If any further pauses will possible require PPM.    9/28 Pt converted from SR to aflutter 130;s this afternoon. continue with amiodarone load. No bradycardia of pauses on tele. Replete K and Mg. CXR r/o effusion  9/29 CXR clear, No bradycardia or AV block on tele. Anticipate discharge tomorrow  9/30     vss   afib  92   amio load   creat to 1.78    dc  diuresis

## 2024-09-30 NOTE — PHYSICAL THERAPY INITIAL EVALUATION ADULT - ADDITIONAL COMMENTS
Pt states he lives alone in single level home with 1 step to enter, uses [place] outdoors, +drives and was independent functionally and with ADL's prior. States his sister is here from out of state to assist as he recovers.

## 2024-09-30 NOTE — PROGRESS NOTE ADULT - ASSESSMENT
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 He underwent cardiac cath on 9/9/24 and had MARGIE to the mid LAD.      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 4 sec conversion pause.  AFIB up to 120s at rest,  started 9/27/28 on amiodarone, remains in AFIB/Flutter alternating with sinus,    HE has now been on amiodarone loading X 23days , remains in rapid AFIb at rest      advise    1. trial low dose metoprolol 12.5 BID  2.cotninue  amiodarone 400 TID x 12 doses , then 200 mg daily  2. check TFTS  3. continue eliquis for now  4. if further bradycardia, or advanced AV block for PPM.   5/ if remains asymptomatic, will f u as OPT, MCOT patch in place

## 2024-09-30 NOTE — PHYSICAL THERAPY INITIAL EVALUATION ADULT - NSPTDMEREC_GEN_A_CORE
Pt will require rolling walker at home due to their dx of s/p TAVR to help complete MRADL's (mobility related activity of daily living)./rolling walker

## 2024-09-30 NOTE — PROGRESS NOTE ADULT - PROBLEM SELECTOR PLAN 2
hold BB and Cardizem  c/w amiodarone  zoll pads placed observe for bradycardia and av block  EP following--Dr Garcia BB   c/w amiodarone  zoll pads placed observe for bradycardia and av block  EP following-

## 2024-09-30 NOTE — PROGRESS NOTE ADULT - PROBLEM SELECTOR PLAN 1
- s/p TAVR 9/25  - monitor B/L groin sites  - daily EKG  - on plavix, atorvastatin  - lasix / aldactone to be resumed in AM per [facility]  - Hold home Metoprolol and Diltiazem at this time per [facility]   -amiodarone load started  - resume Eliquis in AM, if no contraindications  - Case and plan d/w Attending and [facility] team - s/p TAVR 9/25  - daily EKG  - on plavix, atorvastatin  - lasix / aldactone  dc for elevated creat   Metoprolol  50 q12  -amiodarone load  completed in am  possible home tuesday   follow up creat and rhythm  - Eliquis

## 2024-10-01 LAB
ALBUMIN SERPL ELPH-MCNC: 3.8 G/DL — SIGNIFICANT CHANGE UP (ref 3.3–5)
ALP SERPL-CCNC: 57 U/L — SIGNIFICANT CHANGE UP (ref 40–120)
ALT FLD-CCNC: 23 U/L — SIGNIFICANT CHANGE UP (ref 10–45)
ANION GAP SERPL CALC-SCNC: 18 MMOL/L — HIGH (ref 5–17)
AST SERPL-CCNC: 18 U/L — SIGNIFICANT CHANGE UP (ref 10–40)
BILIRUB SERPL-MCNC: 1 MG/DL — SIGNIFICANT CHANGE UP (ref 0.2–1.2)
BLD GP AB SCN SERPL QL: NEGATIVE — SIGNIFICANT CHANGE UP
BUN SERPL-MCNC: 35 MG/DL — HIGH (ref 7–23)
CALCIUM SERPL-MCNC: 9.2 MG/DL — SIGNIFICANT CHANGE UP (ref 8.4–10.5)
CHLORIDE SERPL-SCNC: 99 MMOL/L — SIGNIFICANT CHANGE UP (ref 96–108)
CO2 SERPL-SCNC: 19 MMOL/L — LOW (ref 22–31)
CREAT SERPL-MCNC: 1.76 MG/DL — HIGH (ref 0.5–1.3)
EGFR: 38 ML/MIN/1.73M2 — LOW
GLUCOSE BLDC GLUCOMTR-MCNC: 146 MG/DL — HIGH (ref 70–99)
GLUCOSE BLDC GLUCOMTR-MCNC: 149 MG/DL — HIGH (ref 70–99)
GLUCOSE BLDC GLUCOMTR-MCNC: 153 MG/DL — HIGH (ref 70–99)
GLUCOSE BLDC GLUCOMTR-MCNC: 173 MG/DL — HIGH (ref 70–99)
GLUCOSE SERPL-MCNC: 143 MG/DL — HIGH (ref 70–99)
HCT VFR BLD CALC: 40.8 % — SIGNIFICANT CHANGE UP (ref 39–50)
HGB BLD-MCNC: 13.8 G/DL — SIGNIFICANT CHANGE UP (ref 13–17)
MAGNESIUM SERPL-MCNC: 2 MG/DL — SIGNIFICANT CHANGE UP (ref 1.6–2.6)
MCHC RBC-ENTMCNC: 31.6 PG — SIGNIFICANT CHANGE UP (ref 27–34)
MCHC RBC-ENTMCNC: 33.8 GM/DL — SIGNIFICANT CHANGE UP (ref 32–36)
MCV RBC AUTO: 93.4 FL — SIGNIFICANT CHANGE UP (ref 80–100)
NRBC # BLD: 0 /100 WBCS — SIGNIFICANT CHANGE UP (ref 0–0)
PHOSPHATE SERPL-MCNC: 3.2 MG/DL — SIGNIFICANT CHANGE UP (ref 2.5–4.5)
PLATELET # BLD AUTO: 250 K/UL — SIGNIFICANT CHANGE UP (ref 150–400)
POTASSIUM SERPL-MCNC: 4.2 MMOL/L — SIGNIFICANT CHANGE UP (ref 3.5–5.3)
POTASSIUM SERPL-SCNC: 4.2 MMOL/L — SIGNIFICANT CHANGE UP (ref 3.5–5.3)
PROT SERPL-MCNC: 6.9 G/DL — SIGNIFICANT CHANGE UP (ref 6–8.3)
RBC # BLD: 4.37 M/UL — SIGNIFICANT CHANGE UP (ref 4.2–5.8)
RBC # FLD: 11.7 % — SIGNIFICANT CHANGE UP (ref 10.3–14.5)
RH IG SCN BLD-IMP: NEGATIVE — SIGNIFICANT CHANGE UP
SODIUM SERPL-SCNC: 136 MMOL/L — SIGNIFICANT CHANGE UP (ref 135–145)
WBC # BLD: 8.33 K/UL — SIGNIFICANT CHANGE UP (ref 3.8–10.5)
WBC # FLD AUTO: 8.33 K/UL — SIGNIFICANT CHANGE UP (ref 3.8–10.5)

## 2024-10-01 PROCEDURE — 71045 X-RAY EXAM CHEST 1 VIEW: CPT | Mod: 26

## 2024-10-01 PROCEDURE — 93010 ELECTROCARDIOGRAM REPORT: CPT

## 2024-10-01 PROCEDURE — 99232 SBSQ HOSP IP/OBS MODERATE 35: CPT

## 2024-10-01 PROCEDURE — 99223 1ST HOSP IP/OBS HIGH 75: CPT

## 2024-10-01 RX ORDER — METOPROLOL TARTRATE 50 MG
12.5 TABLET ORAL ONCE
Refills: 0 | Status: COMPLETED | OUTPATIENT
Start: 2024-10-01 | End: 2024-10-01

## 2024-10-01 RX ORDER — METOPROLOL TARTRATE 50 MG
25 TABLET ORAL
Refills: 0 | Status: DISCONTINUED | OUTPATIENT
Start: 2024-10-01 | End: 2024-10-02

## 2024-10-01 RX ADMIN — Medication 75 MILLIGRAM(S): at 10:32

## 2024-10-01 RX ADMIN — Medication 25 MILLIGRAM(S): at 17:06

## 2024-10-01 RX ADMIN — Medication 12.5 MILLIGRAM(S): at 10:35

## 2024-10-01 RX ADMIN — APIXABAN 2.5 MILLIGRAM(S): 5 TABLET, FILM COATED ORAL at 17:06

## 2024-10-01 RX ADMIN — CHLORHEXIDINE GLUCONATE ORAL RINSE 1 APPLICATION(S): 1.2 SOLUTION DENTAL at 10:30

## 2024-10-01 RX ADMIN — ATORVASTATIN CALCIUM 10 MILLIGRAM(S): 10 TABLET, FILM COATED ORAL at 21:41

## 2024-10-01 RX ADMIN — APIXABAN 2.5 MILLIGRAM(S): 5 TABLET, FILM COATED ORAL at 06:07

## 2024-10-01 NOTE — PROGRESS NOTE ADULT - PROBLEM SELECTOR PLAN 1
- s/p TAVR 9/25  - daily EKG  - on plavix, atorvastatin  - lasix / aldactone  dc for elevated creat   Metoprolol  25 q12  -amiodarone load  completed in am  possible home tuesday   follow up creat and rhythm  - Eliquis 2.5 bid

## 2024-10-01 NOTE — CONSULT NOTE ADULT - ASSESSMENT
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 He underwent cardiac cath on 9/9/24 and had MARGIE to the mid LAD.      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 4 sec conversion pause    AFIB up to 120s at rest    advise    1. start amiodarone 400 TID x 12 doses , then 200 mg daily  2. chekc TFTS  3. continue eliquis for now  4. if further bradycardia, or advanced AV block for PPM.       9/25: s/p Right transfemoral TAVR 29mm Evolut Fx(+). Per [facility] NP,  pre-op RBBB, intermittent CHB intraoperatively, maintain TVP to external PPM overnight. Patient transferred to SDU, in no acute distress. Monitor groins. Daily EKG. Repeat TTE in AM. Plavix tonight, Eliquis tomorrow if no contraindication per Dr. Heaton.   9/26 PW to be d/c, no pacing overnight.   Will discuss medications with Dr Heaton  9/27 afib 130's  3.4 second pause .  dw EP--will start amio load and observe on tele.  If any further pauses will possible require PPM.  
83 y/o Male with PMHx significant for HTN, HLD, DM2 (managed with diet, not on any meds),  tachy-chelsea syndrome, baseline RBBB on EKG, Afib (on Eliquis, afib recently dx this past February), remote colon CA (26 years ago s/p colon resection and chemo), FOX/CKD, and spinal stenosis who reports a history of dyspnea on exertion with walking around his home. He underwent cardiac cath on 9/9/24 and had MARGIE to the mid LAD.     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  Started 9/27/28 on amiodarone. Has had PAF since post TAVR. He has been in AF since 9/29 ~430AM. He has been on therapeutic Eliquis since 9/28.     #TAVR 9/25/24  #1st degree AVB, RBBB/LPFB  #AF with RVR    Recommendations:  -Recommend PPM prior to rhythm control strategy given 1st degree AVB, RBBB/LPFB  -NPO at MN  -Hold Eliquis after tonight's dose  -Active T&S  -D/w EP attending and primary team

## 2024-10-01 NOTE — CONSULT NOTE ADULT - SUBJECTIVE AND OBJECTIVE BOX
CHIEF COMPLAINT: AF with RVR    HISTORY OF PRESENT ILLNESS: 85 y/o Male with PMHx significant for HTN, HLD, DM2 (managed with diet, not on any meds),  tachy-chelsea syndrome, baseline RBBB on EKG, Afib (on Eliquis, afib recently dx this past February), remote colon CA (26 years ago s/p colon resection and chemo), FOX/CKD, and spinal stenosis who reports a history of dyspnea on exertion with walking around his home. He underwent cardiac cath on 9/9/24 and had MARGIE to the mid LAD.      He has baseline first degree AV block and RBBB, required temp PPM intraoperatively for CHB, no further advanced AVblock, He was maintained on low dose metoprolol + cardizem preoperative    He now has developed PAF, with 3. 4 sec conversion pause on 9/27.  AFIB up to 120s at rest,  started 9/27/28 on amiodarone, remains in AFIB/Flutter alternating with sinus,    He has now been on amiodarone loading X days , remains in rapid AFIb at rest. Appears to have been in AF since 9/29 AM. He has been on Eliquis since then .        Allergies    codeine (Rash)    Intolerances    	    MEDICATIONS:  aMIOdarone    Tablet   Oral   aMIOdarone    Tablet 200 milliGRAM(s) Oral daily  apixaban 2.5 milliGRAM(s) Oral every 12 hours  clopidogrel Tablet 75 milliGRAM(s) Oral daily  metoprolol tartrate 25 milliGRAM(s) Oral two times a day        melatonin 3 milliGRAM(s) Oral at bedtime PRN      atorvastatin 10 milliGRAM(s) Oral at bedtime  dextrose 50% Injectable 25 Gram(s) IV Push once  dextrose 50% Injectable 12.5 Gram(s) IV Push once  dextrose 50% Injectable 25 Gram(s) IV Push once  dextrose Oral Gel 15 Gram(s) Oral once PRN  glucagon  Injectable 1 milliGRAM(s) IntraMuscular once  insulin lispro (ADMELOG) corrective regimen sliding scale   SubCutaneous three times a day before meals  insulin lispro (ADMELOG) corrective regimen sliding scale   SubCutaneous at bedtime    chlorhexidine 2% Cloths 1 Application(s) Topical <User Schedule>  dextrose 5%. 1000 milliLiter(s) IV Continuous <Continuous>  dextrose 5%. 1000 milliLiter(s) IV Continuous <Continuous>  influenza  Vaccine (HIGH DOSE) 0.5 milliLiter(s) IntraMuscular once  sodium chloride 0.9%. 1000 milliLiter(s) IV Continuous <Continuous>      PAST MEDICAL & SURGICAL HISTORY:  HTN (hypertension)      HLD (hyperlipidemia)      AF (atrial fibrillation)      Aortic stenosis      Diabetes mellitus      FOX (acute kidney injury)      Chronic kidney disease (CKD)      H/O spinal stenosis      H/O acute heart failure      Atrial fibrillation with RVR      GERD (gastroesophageal reflux disease)      History of BPH      Colon cancer      H/O neuropathy      H/O tachycardia-bradycardia syndrome      History of right bundle branch block (RBBB)      S/P colon resection      History of chemotherapy      Stented coronary artery          FAMILY HISTORY:      SOCIAL HISTORY:    [ ]Non-smoker  [ ] Smoker  [ ] Alcohol      REVIEW OF SYSTEMS:  See HPI. All ROS negative unless otherwise noted    PHYSICAL EXAM:  T(C): 36.6 (10-01-24 @ 13:00), Max: 36.6 (10-01-24 @ 13:00)  HR: 114 (10-01-24 @ 17:02) (85 - 114)  BP: 139/74 (10-01-24 @ 17:02) (124/80 - 145/90)  RR: 18 (10-01-24 @ 13:00) (18 - 18)  SpO2: 93% (10-01-24 @ 15:28) (93% - 97%)  Wt(kg): --  I&O's Summary    30 Sep 2024 07:01  -  01 Oct 2024 07:00  --------------------------------------------------------  IN: 600 mL / OUT: 1300 mL / NET: -700 mL    01 Oct 2024 07:01  -  01 Oct 2024 17:31  --------------------------------------------------------  IN: 0 mL / OUT: 650 mL / NET: -650 mL        Appearance: Alert. NAD	  HEENT:   NC/AT	  Cardiovascular: +S1S2 RRR no m/g/r  Respiratory: CTA B/L	  Psychiatry: A & O x 3, Mood & affect appropriate  Gastrointestinal:  Soft, NT.ND., + BS	  Skin: No rashes	  Neurologic: Non-focal  Extremities: No edema BLE  Vascular: Peripheral pulses palpable 2+ bilaterally      LABS:	 	    CBC Full  -  ( 01 Oct 2024 06:18 )  WBC Count : 8.33 K/uL  Hemoglobin : 13.8 g/dL  Hematocrit : 40.8 %  Platelet Count - Automated : 250 K/uL  Mean Cell Volume : 93.4 fl  Mean Cell Hemoglobin : 31.6 pg  Mean Cell Hemoglobin Concentration : 33.8 gm/dL  Auto Neutrophil # : x  Auto Lymphocyte # : x  Auto Monocyte # : x  Auto Eosinophil # : x  Auto Basophil # : x  Auto Neutrophil % : x  Auto Lymphocyte % : x  Auto Monocyte % : x  Auto Eosinophil % : x  Auto Basophil % : x    10-01    136  |  99  |  35[H]  ----------------------------<  143[H]  4.2   |  19[L]  |  1.76[H]  09-30    135  |  98  |  36[H]  ----------------------------<  115[H]  4.7   |  23  |  1.93[H]    Ca    9.2      01 Oct 2024 06:18  Ca    9.7      30 Sep 2024 15:27  Phos  3.2     10-01  Phos  3.9     09-30  Mg     2.0     10-01  Mg     2.2     09-30    TPro  6.9  /  Alb  3.8  /  TBili  1.0  /  DBili  x   /  AST  18  /  ALT  23  /  AlkPhos  57  10-01  TPro  7.0  /  Alb  3.9  /  TBili  1.0  /  DBili  x   /  AST  25  /  ALT  21  /  AlkPhos  56  09-30      proBNP:   Lipid Profile:   HgA1c:   TSH:       CARDIAC MARKERS:                TELEMETRY: 	    ECG:  	  RADIOLOGY:  OTHER: 	    PREVIOUS DIAGNOSTIC TESTING:    Echocardiogram:    Catheterization:    Stress Test:  	  	  ASSESSMENT/PLAN: 	     CHIEF COMPLAINT: AF with RVR    HISTORY OF PRESENT ILLNESS: 85 y/o Male with PMHx significant for HTN, HLD, DM2 (managed with diet, not on any meds),  tachy-chelsea syndrome, baseline RBBB on EKG, Afib (on Eliquis, afib recently dx this past February), remote colon CA (26 years ago s/p colon resection and chemo), FOX/CKD, and spinal stenosis who reports a history of dyspnea on exertion with walking around his home. He underwent cardiac cath on 24 and had MARGIE to the mid LAD.     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  AFIB up to 120s at rest,  started 28 on amiodarone, remains in AFIB/Flutter alternating with sinus. He has been in AF since  ~430AM. He has been on therapeutic Eliquis since .         Allergies    codeine (Rash)    Intolerances    	    MEDICATIONS:  aMIOdarone    Tablet   Oral   aMIOdarone    Tablet 200 milliGRAM(s) Oral daily  apixaban 2.5 milliGRAM(s) Oral every 12 hours  clopidogrel Tablet 75 milliGRAM(s) Oral daily  metoprolol tartrate 25 milliGRAM(s) Oral two times a day        melatonin 3 milliGRAM(s) Oral at bedtime PRN      atorvastatin 10 milliGRAM(s) Oral at bedtime  dextrose 50% Injectable 25 Gram(s) IV Push once  dextrose 50% Injectable 12.5 Gram(s) IV Push once  dextrose 50% Injectable 25 Gram(s) IV Push once  dextrose Oral Gel 15 Gram(s) Oral once PRN  glucagon  Injectable 1 milliGRAM(s) IntraMuscular once  insulin lispro (ADMELOG) corrective regimen sliding scale   SubCutaneous three times a day before meals  insulin lispro (ADMELOG) corrective regimen sliding scale   SubCutaneous at bedtime    chlorhexidine 2% Cloths 1 Application(s) Topical <User Schedule>  dextrose 5%. 1000 milliLiter(s) IV Continuous <Continuous>  dextrose 5%. 1000 milliLiter(s) IV Continuous <Continuous>  influenza  Vaccine (HIGH DOSE) 0.5 milliLiter(s) IntraMuscular once  sodium chloride 0.9%. 1000 milliLiter(s) IV Continuous <Continuous>      PAST MEDICAL & SURGICAL HISTORY:  HTN (hypertension)      HLD (hyperlipidemia)      AF (atrial fibrillation)      Aortic stenosis      Diabetes mellitus      FOX (acute kidney injury)      Chronic kidney disease (CKD)      H/O spinal stenosis      H/O acute heart failure      Atrial fibrillation with RVR      GERD (gastroesophageal reflux disease)      History of BPH      Colon cancer      H/O neuropathy      H/O tachycardia-bradycardia syndrome      History of right bundle branch block (RBBB)      S/P colon resection      History of chemotherapy      Stented coronary artery          FAMILY HISTORY:      SOCIAL HISTORY:    [ ]Non-smoker  [ ] Smoker  [ ] Alcohol      REVIEW OF SYSTEMS:  See HPI. All ROS negative unless otherwise noted    PHYSICAL EXAM:  T(C): 36.6 (10-01-24 @ 13:00), Max: 36.6 (10-01-24 @ 13:00)  HR: 114 (10-01-24 @ 17:02) (85 - 114)  BP: 139/74 (10-01-24 @ 17:02) (124/80 - 145/90)  RR: 18 (10-01-24 @ 13:00) (18 - 18)  SpO2: 93% (10-01-24 @ 15:28) (93% - 97%)  Wt(kg): --  I&O's Summary    30 Sep 2024 07:01  -  01 Oct 2024 07:00  --------------------------------------------------------  IN: 600 mL / OUT: 1300 mL / NET: -700 mL    01 Oct 2024 07:  -  01 Oct 2024 17:31  --------------------------------------------------------  IN: 0 mL / OUT: 650 mL / NET: -650 mL        Appearance: Alert. NAD	  HEENT:   NC/AT	  Cardiovascular: +S1S2 irregular  Respiratory: CTA B/L	  Psychiatry: A & O x 3, Mood & affect appropriate  Gastrointestinal:  Soft	  Skin: No rashes	  Neurologic: Non-focal  Extremities: No edema BLE      LABS:	 	    CBC Full  -  ( 01 Oct 2024 06:18 )  WBC Count : 8.33 K/uL  Hemoglobin : 13.8 g/dL  Hematocrit : 40.8 %  Platelet Count - Automated : 250 K/uL  Mean Cell Volume : 93.4 fl  Mean Cell Hemoglobin : 31.6 pg  Mean Cell Hemoglobin Concentration : 33.8 gm/dL  Auto Neutrophil # : x  Auto Lymphocyte # : x  Auto Monocyte # : x  Auto Eosinophil # : x  Auto Basophil # : x  Auto Neutrophil % : x  Auto Lymphocyte % : x  Auto Monocyte % : x  Auto Eosinophil % : x  Auto Basophil % : x    10-01    136  |  99  |  35[H]  ----------------------------<  143[H]  4.2   |  19[L]  |  1.76[H]      135  |  98  |  36[H]  ----------------------------<  115[H]  4.7   |  23  |  1.93[H]    Ca    9.2      01 Oct 2024 06:18  Ca    9.7      30 Sep 2024 15:27  Phos  3.2     10-01  Phos  3.9       Mg     2.0     10-01  Mg     2.2         TPro  6.9  /  Alb  3.8  /  TBili  1.0  /  DBili  x   /  AST  18  /  ALT  23  /  AlkPhos  57  10-01  TPro  7.0  /  Alb  3.9  /  TBili  1.0  /  DBili  x   /  AST  25  /  ALT  21  /  AlkPhos  56          TELEMETRY: -110s, in AF since ~430AM. Paroxsymal AF <=> NSR since post TAVR.   	    EC/9: NSR with 1st degree AVB, RBBB.  AF with RVR, LPFB,RBBB  	    PREVIOUS DIAGNOSTIC TESTING:    Echocardiogram:  < from: TTE W or WO Ultrasound Enhancing Agent (24 @ 09:42) >  CONCLUSIONS:      1. Left ventricular systolic function is normal with an ejection fraction of 71 % by Avila's method of disks.   2. Probably normal right ventricular systolic function. Tricuspid annular plane systolic excursion (TAPSE) is 2.8 cm (normal >=1.7 cm).   3. No pericardial effusion seen.   4. 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 No prosthetic aortic stenosis. No intravalvular regurgitation No paravalvular regurgitation.   5. Compared to the transthoracic echocardiogram performed on 2024, s/p TAVR.   6. Moderate mitral valve stenosis.   7. Technically difficult image quality.   8. There is moderate calcification of the mitral valve annulus.   9. There is no evidence of a left ventricular thrombus.    ________________________________________________________________________________________  FINDINGS:     Left Ventricle:  Left ventricular wall thickness is normal. Left ventricular systolic function is normal with a calculated ejection fraction of 71 % by the Avila's biplane method of disks. There are no regional wall motion abnormalities seen. There is no evidence of a left ventricular thrombus. Analysis of left ventricular diastolic function and filling pressure is made challenging by the presence of mitral annular calcification.     Right Ventricle:  The right ventricle is not well visualized. Right ventricular systolic function is probably normal. Tricuspid annular plane systolic excursion (TAPSE) is 2.8 cm (normal >=1.7 cm).     Left Atrium:  The left atrium is normal in size with an indexed volume of 30.27 ml/m².     Right Atrium:  The right atrium is normal in size with an indexed volume of 22.63 ml/m².     Interatrial Septum:  The interatrial septum appears intact.     Aortic Valve:  A 29mm Evolut FX+ (TAVR) is present in the aortic position. The prosthetic valve is well seated with normal function. There is no prosthetic aortic stenosis. There is no intravalvular regurgitation. There is no paravalvular regurgitation. The peak transaortic velocity is 2.53 m/s, peak transaortic gradient is 25.6 mmHg and mean transaortic gradient is 13.0 mmHg with an LVOT/aortic valve VTI ratio of 0.53.     Mitral Valve:  There is moderate calcification of the mitral valve annulus. Mitral valve leaflets are diffusely calcified. There is moderate mitral valve stenosis, secondary to dystrophic mitral annular calcification. The transmitral peak gradient is 18.1 mmHg and mean gradient is 6.00 mmHg at a heart rate of 94 bpm. There is trace mitral regurgitation.     Tricuspid Valve:  Structurally normal tricuspid valve with normal leaflet excursion. The tricuspid valve was not well visualized. There is trace tricuspid regurgitation. There is insufficient tricuspid regurgitation detected to calculate pulmonary artery systolic pressure.     Pericardium:  No pericardial effusion seen.     Systemic Veins:  The inferior vena cava is normal in size measuring 1.40 cm in diameter, (normal <2.1cm) with normal inspiratory collapse (normal >50%) consistent with normal right atrial pressure (~3, range 0-5mmHg).  ____________________________________________________________________  QUANTITATIVE DATA:  Left Ventricle Measurements: (Indexed to BSA)     IVSd (2D):   0.9 cm  LVPWd (2D):  1.2 cm  LVIDd (2D):  5.1 cm  LV Mass:     201 g  84.3 g/m²  LV Vol d, MOD A2C: 84.6 ml 35.52 ml/m²  LV Vol d, MOD A4C: 99.7 ml 41.86 ml/m²  LV Vol d, MOD BP:  94.6 ml 39.73 ml/m²  LV Vol s, MOD A2C: 20.6 ml 8.65 ml/m²  LV Vol s, MOD A4C: 38.7 ml 16.25 ml/m²  LV Vol s,MOD BP:  27.1 ml 11.40 ml/m²  LVOT SV MOD BP:    67.5 ml  LV EF% MOD BP:     71 %     MV E Vmax:    0.81 m/s  MV A Vmax:    1.70 m/s  MV E/A:       0.48  e' lateral:   5.55 cm/s  e' medial:    3.00 cm/s  E/e' lateral: 14.67  E/e' medial:  27.13  E/e'Average: 19.04    Aorta Measurements: (Normal range) (Indexed to BSA)     Ao Root d (3.1 - 3.7 cm)            Left Atrium Measurements: (Indexed to BSA)  LA Diam 2D: 5.00 cm         Right Ventricle Measurements: Right Atrial Measurements:     TAPSE:         2.8 cm       RA Vol:       53.90 ml  RV S' Vmax:      16.10 cm/s   RA Vol Index: 22.63 ml/m²  RV Base (RVID1): 4.6 cm       LVOT / RVOT/ Qp/Qs Data: (Indexed to BSA)  LVOT Vmax:      1.38 m/s  LVOT Vmn:       0.933 m/s  LVOT VTI:       23.90cm  LVOT peak grad: 8 mmHg  LVOT mean grad: 4.0 mmHg    Aortic Valve Measurements:  AV Vmax:                2.5 m/s  AV Peak Gradient:       25.6 mmHg  AV Mean Gradient:       13.0 mmHg  AV VTI:                 45.3 cm  AV VTI Ratio:           0.53  AoV Dimensionless Index 0.53    Mitral Valve Measurements:     MV Vmax:        2.13 m/s  MV VTI, catracho     0.577 m  MV Mean Grad:   6.0 mmHg  MV Peak Grad:   18.1 mmHg  MV E Vmax:      0.8 m/s  MV A Vmax:      1.7 m/s  MV E/A:         0.5  MV Gradient HR: 94 bpm       Tricuspid Valve Measurements:     RA Pressure: 3 mmHg    < end of copied text >    < from: [facility] (24 @ 11:06) >  Conclusions     TAVR was performed with a 29mm EVOLUT FX+ valve via percutaneous  right TF access in the setting of symptomatic severe    aortic stenosis. The patient has CHRONIC DIASTOLIC HEART FAILURE; the  Serum Pro-BNP was 1643 pg/mL on 24 and  1100 pg/mL on 24; the LVEDP was 27 mmHg upon arrival to the  hybrid OR (prior to TAVR). There was mild PVL post  TAVR; please refer to the intraoperative TTE report for additional  details. The patient has a baseline RBBB and the TVP was  secured inplace to allow for a period of additional observation post  TAVR. Following closure of the right CFA with 2 Proglides,  aortography demonstrated appropriate runoff and hemostasis.      < end of copied text >      Catheterization:  < from: Cardiac Catheterization (24 @ 11:15) >  Conclusions:   Successful PCI with MARGIE to the mid LAD.  DAPT for 6 mths and proceed  with TAVR    < end of copied text >      ASSESSMENT/PLAN:

## 2024-10-01 NOTE — PROGRESS NOTE ADULT - SUBJECTIVE AND OBJECTIVE BOX
VITAL SIGNS    Telemetry:  AFib aflutter  120's x 24 hrs  Vital Signs Last 24 Hrs  T(C): 36.5 (10-01-24 @ 04:33), Max: 36.6 (09-30-24 @ 12:30)  T(F): 97.7 (10-01-24 @ 04:33), Max: 97.9 (09-30-24 @ 12:30)  HR: 85 (10-01-24 @ 10:30) (85 - 113)  BP: 124/80 (10-01-24 @ 10:30) (124/80 - 145/90)  RR: 18 (10-01-24 @ 04:33) (18 - 18)  SpO2: 95% (10-01-24 @ 04:33) (95% - 97%)            09-30 @ 07:01  -  10-01 @ 07:00  --------------------------------------------------------  IN: 600 mL / OUT: 1300 mL / NET: -700 mL       Daily     Daily   Admit Wt: Drug Dosing Weight  Height (cm): 182.9 (25 Sep 2024 10:05)  Weight (kg): 106.5 (25 Sep 2024 10:05)  BMI (kg/m2): 31.8 (25 Sep 2024 10:05)  BSA (m2): 2.28 (25 Sep 2024 10:05)    Bilirubin Total: 1.0 mg/dL (10-01 @ 06:18)    CAPILLARY BLOOD GLUCOSE      POCT Blood Glucose.: 149 mg/dL (01 Oct 2024 07:37)  POCT Blood Glucose.: 138 mg/dL (30 Sep 2024 21:42)  POCT Blood Glucose.: 88 mg/dL (30 Sep 2024 16:36)  POCT Blood Glucose.: 176 mg/dL (30 Sep 2024 11:33)          MEDICATIONS  aMIOdarone    Tablet   Oral   aMIOdarone    Tablet 200 milliGRAM(s) Oral daily  apixaban 2.5 milliGRAM(s) Oral every 12 hours  atorvastatin 10 milliGRAM(s) Oral at bedtime  chlorhexidine 2% Cloths 1 Application(s) Topical <User Schedule>  clopidogrel Tablet 75 milliGRAM(s) Oral daily  dextrose 5%. 1000 milliLiter(s) IV Continuous <Continuous>  dextrose 5%. 1000 milliLiter(s) IV Continuous <Continuous>  dextrose 50% Injectable 25 Gram(s) IV Push once  dextrose 50% Injectable 12.5 Gram(s) IV Push once  dextrose 50% Injectable 25 Gram(s) IV Push once  dextrose Oral Gel 15 Gram(s) Oral once PRN  glucagon  Injectable 1 milliGRAM(s) IntraMuscular once  influenza  Vaccine (HIGH DOSE) 0.5 milliLiter(s) IntraMuscular once  insulin lispro (ADMELOG) corrective regimen sliding scale   SubCutaneous three times a day before meals  insulin lispro (ADMELOG) corrective regimen sliding scale   SubCutaneous at bedtime  melatonin 3 milliGRAM(s) Oral at bedtime PRN  metoprolol tartrate 25 milliGRAM(s) Oral two times a day  metoprolol tartrate 12.5 milliGRAM(s) Oral once  sodium chloride 0.9%. 1000 milliLiter(s) IV Continuous <Continuous>      >>> <<<  PHYSICAL EXAM  Subjective: NAD  Neurology: alert and oriented x 3, nonfocal, no gross deficits  CV :s1s2  Sternal Wound :  CDI , Stable  Lungs:cta  Abdomen: soft, NT,ND, (+ )BM  :  voiding  Extremities: -c/c/e      LABS  10-01    136  |  99  |  35[H]  ----------------------------<  143[H]  4.2   |  19[L]  |  1.76[H]    Ca    9.2      01 Oct 2024 06:18  Phos  3.2     10-01  Mg     2.0     10-01    TPro  6.9  /  Alb  3.8  /  TBili  1.0  /  DBili  x   /  AST  18  /  ALT  23  /  AlkPhos  57  10-01                                 13.8   8.33  )-----------( 250      ( 01 Oct 2024 06:18 )             40.8          < from: Xray Chest 1 View- PORTABLE-Urgent (Xray Chest 1 View- PORTABLE-Urgent .) (09.28.24 @ 13:45) >    FINDINGS:  Status post TAVR.  The cardiac silhouette is not adequately evaluated on AP film.  No focal consolidations. Trace left pleural effusion.  There is no pneumothorax or right pleural effusion.  Degenerative changes of bilateral shoulders. No acute osseous   abnormalities.    IMPRESSION:  No focal consolidations.    < end of copied text >         PAST MEDICAL & SURGICAL HISTORY:  HTN (hypertension)      HLD (hyperlipidemia)      AF (atrial fibrillation)      Aortic stenosis      Diabetes mellitus      FOX (acute kidney injury)      Chronic kidney disease (CKD)      H/O spinal stenosis      H/O acute heart failure      Atrial fibrillation with RVR      GERD (gastroesophageal reflux disease)      History of BPH      Colon cancer      H/O neuropathy      H/O tachycardia-bradycardia syndrome      History of right bundle branch block (RBBB)      S/P colon resection      History of chemotherapy      Stented coronary artery

## 2024-10-01 NOTE — PROGRESS NOTE ADULT - ASSESSMENT
85 y/o Male with PMHx significant for HTN, HLD, DM2 (managed with diet, not on any hyperglycemic meds), tachy-chelsea syndrome, baseline RBBB on EKG, Afib (on Eliquis, afib recently dx this past February), remote colon CA (26 years ago s/p colon resection and chemo), FOX/CKD, and spinal stenosis who reports a history of dyspnea on exertion with walking around his home. He underwent cardiac cath on 9/9/24 and had MARGIE to the mid LAD. He is now scheduled for TAVR on 9/25/24. He denies CP, dizziness, syncope, fever or chills.      9/25: s/p Right transfemoral TAVR 29mm Evolut Fx(+). Per [facility] NP,  pre-op RBBB, intermittent CHB intraoperatively, maintain TVP to external PPM overnight. Patient transferred to SDU, in no acute distress. Monitor groins. Daily EKG. Repeat TTE in AM. Plavix tonight, Eliquis tomorrow if no contraindication per Dr. Heaton.   9/26 PW to be d/c, no pacing overnight.   Will discuss medications with Dr Heaton  9/27 afib 130's  3.4 second pause .  dw EP--will start amio load and observe on tele.  If any further pauses will possible require PPM.    9/28 Pt converted from SR to aflutter 130;s this afternoon. continue with amiodarone load. No bradycardia of pauses on tele. Replete K and Mg. CXR r/o effusion  9/29 CXR clear, No bradycardia or AV block on tele. Anticipate discharge tomorrow  9/30     vss   afib  92   amio load   creat to 1.78    dc  diuresis  10/1 Completed amiodarone load 5G. Lopressor increased to 25 bid. House EP consulted as requested by Dr Dejesus for possible cardioversion

## 2024-10-01 NOTE — CONSULT NOTE ADULT - NS ATTEND AMEND GEN_ALL_CORE FT
The patient has had chronic RBBB/LPHB with OR prolongation, and now, needs AA medications to maintain sinus. He has also has sinus node dysfunction. With amiodarone loading, there are risks of exacerbating heart block. The presence of a pacemaker will allow safe treatment of his AF with AA medications

## 2024-10-01 NOTE — PROGRESS NOTE ADULT - PROBLEM SELECTOR PLAN 2
BB   c/w amiodarone  zoll pads placed observe for bradycardia and av block  EP to evaluate for possible cardioversion

## 2024-10-02 DIAGNOSIS — Z95.3 PRESENCE OF XENOGENIC HEART VALVE: ICD-10-CM

## 2024-10-02 DIAGNOSIS — Z95.0 PRESENCE OF CARDIAC PACEMAKER: ICD-10-CM

## 2024-10-02 LAB
ANION GAP SERPL CALC-SCNC: 14 MMOL/L — SIGNIFICANT CHANGE UP (ref 5–17)
APTT BLD: 35.3 SEC — SIGNIFICANT CHANGE UP (ref 24.5–35.6)
BUN SERPL-MCNC: 36 MG/DL — HIGH (ref 7–23)
CALCIUM SERPL-MCNC: 9.3 MG/DL — SIGNIFICANT CHANGE UP (ref 8.4–10.5)
CHLORIDE SERPL-SCNC: 100 MMOL/L — SIGNIFICANT CHANGE UP (ref 96–108)
CO2 SERPL-SCNC: 20 MMOL/L — LOW (ref 22–31)
CREAT SERPL-MCNC: 1.72 MG/DL — HIGH (ref 0.5–1.3)
EGFR: 39 ML/MIN/1.73M2 — LOW
GLUCOSE BLDC GLUCOMTR-MCNC: 151 MG/DL — HIGH (ref 70–99)
GLUCOSE BLDC GLUCOMTR-MCNC: 153 MG/DL — HIGH (ref 70–99)
GLUCOSE BLDC GLUCOMTR-MCNC: 154 MG/DL — HIGH (ref 70–99)
GLUCOSE BLDC GLUCOMTR-MCNC: 158 MG/DL — HIGH (ref 70–99)
GLUCOSE SERPL-MCNC: 151 MG/DL — HIGH (ref 70–99)
HCT VFR BLD CALC: 42.1 % — SIGNIFICANT CHANGE UP (ref 39–50)
HGB BLD-MCNC: 13.9 G/DL — SIGNIFICANT CHANGE UP (ref 13–17)
INR BLD: 1.28 RATIO — HIGH (ref 0.85–1.16)
MCHC RBC-ENTMCNC: 30.7 PG — SIGNIFICANT CHANGE UP (ref 27–34)
MCHC RBC-ENTMCNC: 33 GM/DL — SIGNIFICANT CHANGE UP (ref 32–36)
MCV RBC AUTO: 92.9 FL — SIGNIFICANT CHANGE UP (ref 80–100)
NRBC # BLD: 0 /100 WBCS — SIGNIFICANT CHANGE UP (ref 0–0)
PLATELET # BLD AUTO: 279 K/UL — SIGNIFICANT CHANGE UP (ref 150–400)
POTASSIUM SERPL-MCNC: 4.1 MMOL/L — SIGNIFICANT CHANGE UP (ref 3.5–5.3)
POTASSIUM SERPL-SCNC: 4.1 MMOL/L — SIGNIFICANT CHANGE UP (ref 3.5–5.3)
PROTHROM AB SERPL-ACNC: 14.6 SEC — HIGH (ref 9.9–13.4)
RBC # BLD: 4.53 M/UL — SIGNIFICANT CHANGE UP (ref 4.2–5.8)
RBC # FLD: 11.9 % — SIGNIFICANT CHANGE UP (ref 10.3–14.5)
SODIUM SERPL-SCNC: 134 MMOL/L — LOW (ref 135–145)
WBC # BLD: 8.33 K/UL — SIGNIFICANT CHANGE UP (ref 3.8–10.5)
WBC # FLD AUTO: 8.33 K/UL — SIGNIFICANT CHANGE UP (ref 3.8–10.5)

## 2024-10-02 PROCEDURE — C1769: CPT

## 2024-10-02 PROCEDURE — C9600: CPT | Mod: LD

## 2024-10-02 PROCEDURE — 99232 SBSQ HOSP IP/OBS MODERATE 35: CPT

## 2024-10-02 PROCEDURE — C1887: CPT

## 2024-10-02 PROCEDURE — 93010 ELECTROCARDIOGRAM REPORT: CPT

## 2024-10-02 PROCEDURE — 93454 CORONARY ARTERY ANGIO S&I: CPT | Mod: 59

## 2024-10-02 PROCEDURE — 93724 ELEC ALYS ANTITCHYCAR PM SYS: CPT | Mod: 26

## 2024-10-02 PROCEDURE — 82962 GLUCOSE BLOOD TEST: CPT

## 2024-10-02 PROCEDURE — 80048 BASIC METABOLIC PNL TOTAL CA: CPT

## 2024-10-02 PROCEDURE — 93005 ELECTROCARDIOGRAM TRACING: CPT

## 2024-10-02 PROCEDURE — 33208 INSRT HEART PM ATRIAL & VENT: CPT | Mod: KX

## 2024-10-02 PROCEDURE — C1874: CPT

## 2024-10-02 PROCEDURE — 71045 X-RAY EXAM CHEST 1 VIEW: CPT | Mod: 26

## 2024-10-02 PROCEDURE — 99152 MOD SED SAME PHYS/QHP 5/>YRS: CPT

## 2024-10-02 PROCEDURE — C1894: CPT

## 2024-10-02 PROCEDURE — 93010 ELECTROCARDIOGRAM REPORT: CPT | Mod: 77,76

## 2024-10-02 PROCEDURE — 85027 COMPLETE CBC AUTOMATED: CPT

## 2024-10-02 PROCEDURE — C1725: CPT

## 2024-10-02 RX ORDER — METOPROLOL TARTRATE 50 MG
100 TABLET ORAL DAILY
Refills: 0 | Status: DISCONTINUED | OUTPATIENT
Start: 2024-10-02 | End: 2024-10-03

## 2024-10-02 RX ORDER — METOPROLOL TARTRATE 50 MG
5 TABLET ORAL ONCE
Refills: 0 | Status: COMPLETED | OUTPATIENT
Start: 2024-10-02 | End: 2024-10-02

## 2024-10-02 RX ORDER — METOPROLOL TARTRATE 50 MG
5 TABLET ORAL ONCE
Refills: 0 | Status: DISCONTINUED | OUTPATIENT
Start: 2024-10-02 | End: 2024-10-02

## 2024-10-02 RX ORDER — APIXABAN 5 MG/1
2.5 TABLET, FILM COATED ORAL EVERY 12 HOURS
Refills: 0 | Status: DISCONTINUED | OUTPATIENT
Start: 2024-10-02 | End: 2024-10-03

## 2024-10-02 RX ORDER — PANTOPRAZOLE SODIUM 40 MG/1
40 TABLET, DELAYED RELEASE ORAL
Refills: 0 | Status: DISCONTINUED | OUTPATIENT
Start: 2024-10-02 | End: 2024-10-03

## 2024-10-02 RX ADMIN — Medication 5 MILLIGRAM(S): at 12:49

## 2024-10-02 RX ADMIN — Medication 25 MILLIGRAM(S): at 05:44

## 2024-10-02 RX ADMIN — CHLORHEXIDINE GLUCONATE ORAL RINSE 1 APPLICATION(S): 1.2 SOLUTION DENTAL at 11:36

## 2024-10-02 RX ADMIN — Medication 75 MILLIGRAM(S): at 15:45

## 2024-10-02 RX ADMIN — APIXABAN 2.5 MILLIGRAM(S): 5 TABLET, FILM COATED ORAL at 17:18

## 2024-10-02 RX ADMIN — AMIODARONE HYDROCHLORIDE 200 MILLIGRAM(S): 50 INJECTION, SOLUTION INTRAVENOUS at 05:44

## 2024-10-02 RX ADMIN — Medication 2: at 17:16

## 2024-10-02 RX ADMIN — Medication 100 MILLIGRAM(S): at 15:45

## 2024-10-02 RX ADMIN — ATORVASTATIN CALCIUM 10 MILLIGRAM(S): 10 TABLET, FILM COATED ORAL at 22:08

## 2024-10-02 NOTE — PROGRESS NOTE ADULT - PROBLEM SELECTOR PLAN 3
10/2 s/p PPM placement   EP following  Start Toprol 100 mg PO daily  AC therapy Eliquis 2.5 mg PO BID to restart this evening

## 2024-10-02 NOTE — PROGRESS NOTE ADULT - PROBLEM SELECTOR PROBLEM 1
Aortic stenosis
S/p TAVR (transcatheter aortic valve replacement), bioprosthetic
Aortic stenosis
Aortic stenosis

## 2024-10-02 NOTE — PROGRESS NOTE ADULT - SUBJECTIVE AND OBJECTIVE BOX
24H hour events: Pt without acute complaints, currently remains in AF 70-90s    MEDICATIONS:  aMIOdarone    Tablet   Oral   aMIOdarone    Tablet 200 milliGRAM(s) Oral daily  clopidogrel Tablet 75 milliGRAM(s) Oral daily  metoprolol tartrate 25 milliGRAM(s) Oral two times a day        melatonin 3 milliGRAM(s) Oral at bedtime PRN      atorvastatin 10 milliGRAM(s) Oral at bedtime  dextrose 50% Injectable 12.5 Gram(s) IV Push once  dextrose 50% Injectable 25 Gram(s) IV Push once  dextrose 50% Injectable 25 Gram(s) IV Push once  dextrose Oral Gel 15 Gram(s) Oral once PRN  glucagon  Injectable 1 milliGRAM(s) IntraMuscular once  insulin lispro (ADMELOG) corrective regimen sliding scale   SubCutaneous at bedtime  insulin lispro (ADMELOG) corrective regimen sliding scale   SubCutaneous three times a day before meals    chlorhexidine 2% Cloths 1 Application(s) Topical <User Schedule>  dextrose 5%. 1000 milliLiter(s) IV Continuous <Continuous>  dextrose 5%. 1000 milliLiter(s) IV Continuous <Continuous>  influenza  Vaccine (HIGH DOSE) 0.5 milliLiter(s) IntraMuscular once  sodium chloride 0.9%. 1000 milliLiter(s) IV Continuous <Continuous>      REVIEW OF SYSTEMS:  See HPI, otherwise ROS negative.    PHYSICAL EXAM:  T(C): 36.6 (10-02-24 @ 05:36), Max: 36.6 (10-01-24 @ 13:00)  HR: 91 (10-02-24 @ 05:36) (85 - 114)  BP: 144/68 (10-02-24 @ 05:36) (124/80 - 144/68)  RR: 18 (10-02-24 @ 05:36) (18 - 18)  SpO2: 97% (10-02-24 @ 05:36) (93% - 97%)  Wt(kg): --  I&O's Summary    01 Oct 2024 07:01  -  02 Oct 2024 07:00  --------------------------------------------------------  IN: 1190 mL / OUT: 2200 mL / NET: -1010 mL        Appearance: Alert. NAD	  HEENT:   NC/AT	  Cardiovascular: +S1S2 RRR no m/g/r  Respiratory: CTA B/L	  Psychiatry: A & O x 3, Mood & affect appropriate  Gastrointestinal:  Soft, NT.ND., + BS	  Skin: No rashes	  Neurologic: Non-focal  Extremities: No edema BLE  Vascular: Peripheral pulses palpable 2+ bilaterally      LABS:	 	    CBC Full  -  ( 02 Oct 2024 06:30 )  WBC Count : 8.33 K/uL  Hemoglobin : 13.9 g/dL  Hematocrit : 42.1 %  Platelet Count - Automated : 279 K/uL  Mean Cell Volume : 92.9 fl  Mean Cell Hemoglobin : 30.7 pg  Mean Cell Hemoglobin Concentration : 33.0 gm/dL  Auto Neutrophil # : x  Auto Lymphocyte # : x  Auto Monocyte # : x  Auto Eosinophil # : x  Auto Basophil # : x  Auto Neutrophil % : x  Auto Lymphocyte % : x  Auto Monocyte % : x  Auto Eosinophil % : x  Auto Basophil % : x    10-02    134[L]  |  100  |  36[H]  ----------------------------<  151[H]  4.1   |  20[L]  |  1.72[H]  10-01    136  |  99  |  35[H]  ----------------------------<  143[H]  4.2   |  19[L]  |  1.76[H]    Ca    9.3      02 Oct 2024 06:30  Ca    9.2      01 Oct 2024 06:18  Phos  3.2     10-01  Mg     2.0     10-01    TPro  6.9  /  Alb  3.8  /  TBili  1.0  /  DBili  x   /  AST  18  /  ALT  23  /  AlkPhos  57  10-01    Thyroid Stimulating Hormone, Serum (09.29.24 @ 05:44)    Thyroid Stimulating Hormone, Serum: 2.49 uIU/mL      TELEMETRY: AF 70-90s  	    < from: TTE W or WO Ultrasound Enhancing Agent (09.26.24 @ 09:42) >  CONCLUSIONS:      1. Left ventricular systolic function is normal with an ejection fraction of 71 % by Avila's method of disks.   2. Probably normal right ventricular systolic function. Tricuspid annular plane systolic excursion (TAPSE) is 2.8 cm (normal >=1.7 cm).   3. No pericardial effusion seen.   4. 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 No prosthetic aortic stenosis. No intravalvular regurgitation No paravalvular regurgitation.   5. Compared to the transthoracic echocardiogram performed on 9/25/2024, s/p TAVR.   6. Moderate mitral valve stenosis.   7. Technically difficult image quality.   8. There is moderate calcification of the mitral valve annulus.   9. There is no evidence of a left ventricular thrombus.    ________________________________________________________________________________________  FINDINGS:     Left Ventricle:  Left ventricular wall thickness is normal. Left ventricular systolic function is normal with a calculated ejection fraction of 71 % by the Avila's biplane method of disks. There are no regional wall motion abnormalities seen. There is no evidence of a left ventricular thrombus. Analysis of left ventricular diastolic function and filling pressure is made challenging by the presence of mitral annular calcification.     Right Ventricle:  The right ventricle is not well visualized. Right ventricular systolic function is probably normal. Tricuspid annular plane systolic excursion (TAPSE) is 2.8 cm (normal >=1.7 cm).     Left Atrium:  The left atrium is normal in size with an indexed volume of 30.27 ml/m².     Right Atrium:  The right atrium is normal in size with an indexed volume of 22.63 ml/m².     Interatrial Septum:  The interatrial septum appears intact.     Aortic Valve:  A 29mm Evolut FX+ (TAVR) is present in the aortic position. The prosthetic valve is well seated with normal function. There is no prosthetic aortic stenosis. There is no intravalvular regurgitation. There is no paravalvular regurgitation. The peak transaortic velocity is 2.53 m/s, peak transaortic gradient is 25.6 mmHg and mean transaortic gradient is 13.0 mmHg with an LVOT/aortic valve VTI ratio of 0.53.     Mitral Valve:  There is moderate calcification of the mitral valve annulus. Mitral valve leaflets are diffusely calcified. There is moderate mitral valve stenosis, secondary to dystrophic mitral annular calcification. The transmitral peak gradient is 18.1 mmHg and mean gradient is 6.00 mmHg at a heart rate of 94 bpm. There is trace mitral regurgitation.     Tricuspid Valve:  Structurally normal tricuspid valve with normal leaflet excursion. The tricuspid valve was not well visualized. There is trace tricuspid regurgitation. There is insufficient tricuspid regurgitation detected to calculate pulmonary artery systolic pressure.     Pericardium:  No pericardial effusion seen.     Systemic Veins:  The inferior vena cava is normal in size measuring 1.40 cm in diameter, (normal <2.1cm) with normal inspiratory collapse (normal >50%) consistent with normal right atrial pressure (~3, range 0-5mmHg).  ____________________________________________________________________  QUANTITATIVE DATA:  Left Ventricle Measurements: (Indexed to BSA)     IVSd (2D):   0.9 cm  LVPWd (2D):  1.2 cm  LVIDd (2D):  5.1 cm  LV Mass:     201 g  84.3 g/m²  LV Vol d, MOD A2C: 84.6 ml 35.52 ml/m²  LV Vol d, MOD A4C: 99.7 ml 41.86 ml/m²  LV Vol d, MOD BP:  94.6 ml 39.73 ml/m²  LV Vol s, MOD A2C: 20.6 ml 8.65 ml/m²  LV Vol s, MOD A4C: 38.7 ml 16.25 ml/m²  LV Vol s,MOD BP:  27.1 ml 11.40 ml/m²  LVOT SV MOD BP:    67.5 ml  LV EF% MOD BP:     71 %     MV E Vmax:    0.81 m/s  MV A Vmax:    1.70 m/s  MV E/A:       0.48  e' lateral:   5.55 cm/s  e' medial:    3.00 cm/s  E/e' lateral: 14.67  E/e' medial:  27.13  E/e'Average: 19.04    Aorta Measurements: (Normal range) (Indexed to BSA)     Ao Root d (3.1 - 3.7 cm)            Left Atrium Measurements: (Indexed to BSA)  LA Diam 2D: 5.00 cm         Right Ventricle Measurements: Right Atrial Measurements:     TAPSE:         2.8 cm       RA Vol:       53.90 ml  RV S' Vmax:      16.10 cm/s   RA Vol Index: 22.63 ml/m²  RV Base (RVID1): 4.6 cm       LVOT / RVOT/ Qp/Qs Data: (Indexed to BSA)  LVOT Vmax:      1.38 m/s  LVOT Vmn:       0.933 m/s  LVOT VTI:       23.90cm  LVOT peak grad: 8 mmHg  LVOT mean grad: 4.0 mmHg    Aortic Valve Measurements:  AV Vmax:                2.5 m/s  AV Peak Gradient:       25.6 mmHg  AV Mean Gradient:       13.0 mmHg  AV VTI:                 45.3 cm  AV VTI Ratio:           0.53  AoV Dimensionless Index 0.53    Mitral Valve Measurements:     MV Vmax:        2.13 m/s  MV VTI, catracho     0.577 m  MV Mean Grad:   6.0 mmHg  MV Peak Grad:   18.1 mmHg  MV E Vmax:      0.8 m/s  MV A Vmax:      1.7 m/s  MV E/A:         0.5  MV Gradient HR: 94 bpm       Tricuspid Valve Measurements:     RA Pressure: 3 mmHg    < end of copied text >    	  ASSESSMENT/PLAN: 	     24H hour events: Pt without acute complaints, in AF 70-90s this AM prior to PPM    MEDICATIONS:  aMIOdarone    Tablet   Oral   aMIOdarone    Tablet 200 milliGRAM(s) Oral daily  clopidogrel Tablet 75 milliGRAM(s) Oral daily  metoprolol tartrate 25 milliGRAM(s) Oral two times a day        melatonin 3 milliGRAM(s) Oral at bedtime PRN      atorvastatin 10 milliGRAM(s) Oral at bedtime  dextrose 50% Injectable 12.5 Gram(s) IV Push once  dextrose 50% Injectable 25 Gram(s) IV Push once  dextrose 50% Injectable 25 Gram(s) IV Push once  dextrose Oral Gel 15 Gram(s) Oral once PRN  glucagon  Injectable 1 milliGRAM(s) IntraMuscular once  insulin lispro (ADMELOG) corrective regimen sliding scale   SubCutaneous at bedtime  insulin lispro (ADMELOG) corrective regimen sliding scale   SubCutaneous three times a day before meals    chlorhexidine 2% Cloths 1 Application(s) Topical <User Schedule>  dextrose 5%. 1000 milliLiter(s) IV Continuous <Continuous>  dextrose 5%. 1000 milliLiter(s) IV Continuous <Continuous>  influenza  Vaccine (HIGH DOSE) 0.5 milliLiter(s) IntraMuscular once  sodium chloride 0.9%. 1000 milliLiter(s) IV Continuous <Continuous>      REVIEW OF SYSTEMS:  See HPI, otherwise ROS negative.    PHYSICAL EXAM:  T(C): 36.6 (10-02-24 @ 05:36), Max: 36.6 (10-01-24 @ 13:00)  HR: 91 (10-02-24 @ 05:36) (85 - 114)  BP: 144/68 (10-02-24 @ 05:36) (124/80 - 144/68)  RR: 18 (10-02-24 @ 05:36) (18 - 18)  SpO2: 97% (10-02-24 @ 05:36) (93% - 97%)  Wt(kg): --  I&O's Summary    01 Oct 2024 07:01  -  02 Oct 2024 07:00  --------------------------------------------------------  IN: 1190 mL / OUT: 2200 mL / NET: -1010 mL        Appearance: Alert. NAD	  HEENT:   NC/AT	  Cardiovascular: +S1S2   Respiratory: CTA B/L	  Psychiatry: A & O x 3, Mood & affect appropriate  Gastrointestinal:  Soft, NT.ND., + BS	  Skin: No rashes	  Neurologic: Non-focal  Extremities: No edema BLE      LABS:	 	    CBC Full  -  ( 02 Oct 2024 06:30 )  WBC Count : 8.33 K/uL  Hemoglobin : 13.9 g/dL  Hematocrit : 42.1 %  Platelet Count - Automated : 279 K/uL  Mean Cell Volume : 92.9 fl  Mean Cell Hemoglobin : 30.7 pg  Mean Cell Hemoglobin Concentration : 33.0 gm/dL  Auto Neutrophil # : x  Auto Lymphocyte # : x  Auto Monocyte # : x  Auto Eosinophil # : x  Auto Basophil # : x  Auto Neutrophil % : x  Auto Lymphocyte % : x  Auto Monocyte % : x  Auto Eosinophil % : x  Auto Basophil % : x    10-02    134[L]  |  100  |  36[H]  ----------------------------<  151[H]  4.1   |  20[L]  |  1.72[H]  10-01    136  |  99  |  35[H]  ----------------------------<  143[H]  4.2   |  19[L]  |  1.76[H]    Ca    9.3      02 Oct 2024 06:30  Ca    9.2      01 Oct 2024 06:18  Phos  3.2     10-01  Mg     2.0     10-01    TPro  6.9  /  Alb  3.8  /  TBili  1.0  /  DBili  x   /  AST  18  /  ALT  23  /  AlkPhos  57  10-01    Thyroid Stimulating Hormone, Serum (09.29.24 @ 05:44)    Thyroid Stimulating Hormone, Serum: 2.49 uIU/mL      TELEMETRY: AF 70-90s  	    < from: TTE W or WO Ultrasound Enhancing Agent (09.26.24 @ 09:42) >  CONCLUSIONS:      1. Left ventricular systolic function is normal with an ejection fraction of 71 % by Avila's method of disks.   2. Probably normal right ventricular systolic function. Tricuspid annular plane systolic excursion (TAPSE) is 2.8 cm (normal >=1.7 cm).   3. No pericardial effusion seen.   4. 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 No prosthetic aortic stenosis. No intravalvular regurgitation No paravalvular regurgitation.   5. Compared to the transthoracic echocardiogram performed on 9/25/2024, s/p TAVR.   6. Moderate mitral valve stenosis.   7. Technically difficult image quality.   8. There is moderate calcification of the mitral valve annulus.   9. There is no evidence of a left ventricular thrombus.    ________________________________________________________________________________________  FINDINGS:     Left Ventricle:  Left ventricular wall thickness is normal. Left ventricular systolic function is normal with a calculated ejection fraction of 71 % by the Avila's biplane method of disks. There are no regional wall motion abnormalities seen. There is no evidence of a left ventricular thrombus. Analysis of left ventricular diastolic function and filling pressure is made challenging by the presence of mitral annular calcification.     Right Ventricle:  The right ventricle is not well visualized. Right ventricular systolic function is probably normal. Tricuspid annular plane systolic excursion (TAPSE) is 2.8 cm (normal >=1.7 cm).     Left Atrium:  The left atrium is normal in size with an indexed volume of 30.27 ml/m².     Right Atrium:  The right atrium is normal in size with an indexed volume of 22.63 ml/m².     Interatrial Septum:  The interatrial septum appears intact.     Aortic Valve:  A 29mm Evolut FX+ (TAVR) is present in the aortic position. The prosthetic valve is well seated with normal function. There is no prosthetic aortic stenosis. There is no intravalvular regurgitation. There is no paravalvular regurgitation. The peak transaortic velocity is 2.53 m/s, peak transaortic gradient is 25.6 mmHg and mean transaortic gradient is 13.0 mmHg with an LVOT/aortic valve VTI ratio of 0.53.     Mitral Valve:  There is moderate calcification of the mitral valve annulus. Mitral valve leaflets are diffusely calcified. There is moderate mitral valve stenosis, secondary to dystrophic mitral annular calcification. The transmitral peak gradient is 18.1 mmHg and mean gradient is 6.00 mmHg at a heart rate of 94 bpm. There is trace mitral regurgitation.     Tricuspid Valve:  Structurally normal tricuspid valve with normal leaflet excursion. The tricuspid valve was not well visualized. There is trace tricuspid regurgitation. There is insufficient tricuspid regurgitation detected to calculate pulmonary artery systolic pressure.     Pericardium:  No pericardial effusion seen.     Systemic Veins:  The inferior vena cava is normal in size measuring 1.40 cm in diameter, (normal <2.1cm) with normal inspiratory collapse (normal >50%) consistent with normal right atrial pressure (~3, range 0-5mmHg).  ____________________________________________________________________  QUANTITATIVE DATA:  Left Ventricle Measurements: (Indexed to BSA)     IVSd (2D):   0.9 cm  LVPWd (2D):  1.2 cm  LVIDd (2D):  5.1 cm  LV Mass:     201 g  84.3 g/m²  LV Vol d, MOD A2C: 84.6 ml 35.52 ml/m²  LV Vol d, MOD A4C: 99.7 ml 41.86 ml/m²  LV Vol d, MOD BP:  94.6 ml 39.73 ml/m²  LV Vol s, MOD A2C: 20.6 ml 8.65 ml/m²  LV Vol s, MOD A4C: 38.7 ml 16.25 ml/m²  LV Vol s,MOD BP:  27.1 ml 11.40 ml/m²  LVOT SV MOD BP:    67.5 ml  LV EF% MOD BP:     71 %     MV E Vmax:    0.81 m/s  MV A Vmax:    1.70 m/s  MV E/A:       0.48  e' lateral:   5.55 cm/s  e' medial:    3.00 cm/s  E/e' lateral: 14.67  E/e' medial:  27.13  E/e'Average: 19.04    Aorta Measurements: (Normal range) (Indexed to BSA)     Ao Root d (3.1 - 3.7 cm)            Left Atrium Measurements: (Indexed to BSA)  LA Diam 2D: 5.00 cm         Right Ventricle Measurements: Right Atrial Measurements:     TAPSE:         2.8 cm       RA Vol:       53.90 ml  RV S' Vmax:      16.10 cm/s   RA Vol Index: 22.63 ml/m²  RV Base (RVID1): 4.6 cm       LVOT / RVOT/ Qp/Qs Data: (Indexed to BSA)  LVOT Vmax:      1.38 m/s  LVOT Vmn:       0.933 m/s  LVOT VTI:       23.90cm  LVOT peak grad: 8 mmHg  LVOT mean grad: 4.0 mmHg    Aortic Valve Measurements:  AV Vmax:                2.5 m/s  AV Peak Gradient:       25.6 mmHg  AV Mean Gradient:       13.0 mmHg  AV VTI:                 45.3 cm  AV VTI Ratio:           0.53  AoV Dimensionless Index 0.53    Mitral Valve Measurements:     MV Vmax:        2.13 m/s  MV VTI, catracho     0.577 m  MV Mean Grad:   6.0 mmHg  MV Peak Grad:   18.1 mmHg  MV E Vmax:      0.8 m/s  MV A Vmax:      1.7 m/s  MV E/A:         0.5  MV Gradient HR: 94 bpm       Tricuspid Valve Measurements:     RA Pressure: 3 mmHg    < end of copied text >    	  ASSESSMENT/PLAN:

## 2024-10-02 NOTE — PROGRESS NOTE ADULT - PROBLEM SELECTOR PROBLEM 2
AF (atrial fibrillation)

## 2024-10-02 NOTE — PROGRESS NOTE ADULT - SUBJECTIVE AND OBJECTIVE BOX
VITAL SIGNS    Subjective: "I'm feeling ok" Denies CP, palpitation, SOB, GARCIA, HA, dizziness, N/V/D, fever or chills.  No acute event noted overnight.     Telemetry: Afib        Vital Signs Last 24 Hrs  T(C): 36.6 (10-02-24 @ 11:55), Max: 36.6 (10-02-24 @ 05:36)  T(F): 97.8 (10-02-24 @ 11:55), Max: 97.8 (10-02-24 @ 05:36)  HR: 76 (10-02-24 @ 15:10) (69 - 128)  BP: 156/76 (10-02-24 @ 15:10) (123/71 - 156/76)  RR: 18 (10-02-24 @ 15:10) (14 - 18)  SpO2: 98% (10-02-24 @ 15:10) (94% - 99%)           10-01 @ 07:01  -  10-02 @ 07:00  --------------------------------------------------------  IN: 1190 mL / OUT: 2200 mL / NET: -1010 mL    10-02 @ 07:01  -  10-02 @ 16:03  --------------------------------------------------------  IN: 525 mL / OUT: 0 mL / NET: 525 mL      LABS  10-02    134[L]  |  100  |  36[H]  ----------------------------<  151[H]  4.1   |  20[L]  |  1.72[H]    Ca    9.3      02 Oct 2024 06:30  Phos  3.2     10-  Mg     2.0     10-01    TPro  6.9  /  Alb  3.8  /  TBili  1.0  /  DBili  x   /  AST  18  /  ALT  23  /  AlkPhos  57  1001                                 13.9   8.33  )-----------( 279      ( 02 Oct 2024 06:30 )             42.1          PT/INR - ( 02 Oct 2024 08:26 )   PT: 14.6 sec;   INR: 1.28 ratio         PTT - ( 02 Oct 2024 08:26 )  PTT:35.3 sec        Daily     Daily Weight in k (02 Oct 2024 08:00)    CAPILLARY BLOOD GLUCOSE    POCT Blood Glucose.: 151 mg/dL (02 Oct 2024 12:00)  POCT Blood Glucose.: 158 mg/dL (02 Oct 2024 07:45)  POCT Blood Glucose.: 173 mg/dL (01 Oct 2024 21:03)  POCT Blood Glucose.: 146 mg/dL (01 Oct 2024 16:31)        PHYSICAL EXAM    Neurology: alert and oriented x 3, nonfocal, no gross deficits    CV: (+) S1 and S2, No murmurs, rubs, gallops or clicks    Chest: Left inframammary incision with pressure dressing in place.      Lungs: CTA B/L     Abdomen: soft, nontender, nondistended, positive bowel sounds, (+) Flatus; (+) BM     :  Voiding               Extremities:  B/L LE (-) edema; negative calf tenderness; (+) 2 DP palpable; B/L groin sites C/D/I.         aMIOdarone Tablet   Oral   aMIOdarone  Tablet 200 milliGRAM(s) Oral daily  apixaban 2.5 milliGRAM(s) Oral every 12 hours  atorvastatin 10 milliGRAM(s) Oral at bedtime  chlorhexidine 2% Cloths 1 Application(s) Topical <User Schedule>  clopidogrel Tablet 75 milliGRAM(s) Oral daily  dextrose 5%. 1000 milliLiter(s) IV Continuous <Continuous>  dextrose 5%. 1000 milliLiter(s) IV Continuous <Continuous>  dextrose 50% Injectable 12.5 Gram(s) IV Push once  dextrose 50% Injectable 25 Gram(s) IV Push once  dextrose 50% Injectable 25 Gram(s) IV Push once  dextrose Oral Gel 15 Gram(s) Oral once PRN  glucagon  Injectable 1 milliGRAM(s) IntraMuscular once  influenza  Vaccine (HIGH DOSE) 0.5 milliLiter(s) IntraMuscular once  insulin lispro (ADMELOG) corrective regimen sliding scale  SubCutaneous at bedtime  insulin lispro (ADMELOG) corrective regimen sliding scale  SubCutaneous three times a day before meals  melatonin 3 milliGRAM(s) Oral at bedtime PRN  metoprolol succinate  milliGRAM(s) Oral daily  sodium chloride 0.9%. 1000 milliLiter(s) IV Continuous <Continuous>    Physical Therapy Rec:   Home  [ X ]   Home w/ PT  [  ]  Rehab  [  ]    Discussed with Cardiothoracic Team at AM rounds.

## 2024-10-02 NOTE — PROGRESS NOTE ADULT - ASSESSMENT
83 y/o Male with PMHx significant for HTN, HLD, DM2 (managed with diet, not on any meds),  tachy-chelsea syndrome, baseline RBBB on EKG, Afib (on Eliquis, afib recently dx this past February), remote colon CA (26 years ago s/p colon resection and chemo), FOX/CKD, and spinal stenosis who reports a history of dyspnea on exertion with walking around his home. He underwent cardiac cath on 9/9/24 and had MARGIE to the mid LAD.     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  Started 9/27/28 on amiodarone. Has had PAF since post TAVR. He has been in AF since 9/29 ~430AM. He has been on therapeutic Eliquis since 9/28.     #TAVR 9/25/24  #1st degree AVB, RBBB/LPFB  #AF with RVR  #Tachy-chelsea syndrome    Recommendations:  -Recommend PPM prior to rhythm control strategy given 1st degree AVB, RBBB/LPFB, sinus node dysfunction, and risk of exacerbating heart block with antiarrhythmic medications.   -Eliquis resumption TBD after PPM placement    *Note incomplete* 85 y/o Male with PMHx significant for HTN, HLD, DM2 (managed with diet, not on any meds),  tachy-chelsea syndrome, baseline RBBB on EKG, Afib (on Eliquis, afib recently dx this past February), remote colon CA (26 years ago s/p colon resection and chemo), FOX/CKD, and spinal stenosis who reports a history of dyspnea on exertion with walking around his home. He underwent cardiac cath on 9/9/24 and had MARGIE to the mid LAD.     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  Started 9/27/28 on amiodarone. Has had PAF since post TAVR. He has been in AF since 9/29 ~430AM. He has been on therapeutic Eliquis since 9/28.     #TAVR 9/25/24  #1st degree AVB, RBBB/LPFB  #AF with RVR  #Tachy-chelsea syndrome    Pt is now s/p dual chamber [employer] PPM with Dr Gu on 10/1/24.   Pt pace terminated out of AT post PPM placement (was V-pacing at maximum tracking rate as he was in AT and atrial events were falling into refractory period and being undersensed). Decreased PVARP. Pt DDD 70bpm    Recommendations:  -Eliquis to be resumed tonight. Pt has been on this long term, it was a prior to admission medication for his known history of PAF. On 2.5mg BID as age>80 and Cr>1.5. Moderate mitral stenosis noted on YOSELYN 9/26 however no mitral stenosis noted on YOSELYN from 9/25. As this is a long term medication, will keep Eliquis at this time for OAC. Warfarin vs Eliquis risk/benefit discussed with patient and he is ok with proceeding with Eliquis  -Continue Toprol XL and amiodarone 200mg daily  -D/w patient that the plan will be for short term amiodarone. Explained risk of amiodarone side effects of eyes, liver, lungs and thyroid and need for OP follow up. Will f/u with Dr Brittanie to discuss AF ablation in the future  -Ok for discharge from EP perspective, likely staying the night per CTSx team  -[facility] appt details (10/11/24 at 2:40PM) and instruction sheet provided to patient   -D/w primary team and EP attending 83 y/o Male with PMHx significant for HTN, HLD, DM2 (managed with diet, not on any meds),  tachy-chelsea syndrome, baseline RBBB on EKG, Afib (on Eliquis, afib recently dx this past February), remote colon CA (26 years ago s/p colon resection and chemo), FOX/CKD, and spinal stenosis who reports a history of dyspnea on exertion with walking around his home. He underwent cardiac cath on 9/9/24 and had MARGIE to the mid LAD.     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  Started 9/27/28 on amiodarone. He had been in AF since 9/29 ~430AM. He has been on therapeutic Eliquis since 9/28.     #TAVR 9/25/24  #1st degree AVB, RBBB/LPFB  #AF with RVR  #Tachy-chelsea syndrome    Pt is now s/p dual chamber [employer] PPM with Dr Gu on 10/1/24.   Pt pace terminated out of AT post PPM placement (was V-pacing at maximum tracking rate as he was in AT and atrial events were falling into refractory period and being undersensed). Decreased PVARP. Pt DDD 70bpm. Currently NSR/AP-    Recommendations:  -Eliquis to be resumed tonight. Pt has been on this long term, it was a prior to admission medication for his known history of PAF. On 2.5mg BID as age>80 and Cr>1.5. Moderate mitral stenosis noted on YOSELYN 9/26 however no mitral stenosis noted on YOSELYN from 9/25. As this is a long term medication, will keep Eliquis at this time for OAC. Warfarin vs Eliquis risk/benefit discussed with patient and he is ok with proceeding with Eliquis  -Continue Toprol XL and amiodarone 200mg daily  -D/w patient that the plan will be for short term amiodarone. Explained risk of amiodarone side effects of eyes, liver, lungs and thyroid and need for OP follow up. Will f/u with Dr Brittanie to discuss AF ablation in the future  -Ok for discharge from EP perspective, likely staying the night per CTSx team  -[facility] appt details (10/11/24 at 2:40PM) and instruction sheet provided to patient   -D/w primary team and EP attending

## 2024-10-02 NOTE — PROGRESS NOTE ADULT - PROBLEM SELECTOR PLAN 2
Continue on Amio load 400 mg PO TID for a 5 gram load then decreased to 200 mg PO daily for maintenance dose.  Start Toprol 100 mg PO daily  AC therapy Eliquis 2.5 mg PO BID to restart this evening

## 2024-10-02 NOTE — PROGRESS NOTE ADULT - PROBLEM SELECTOR PLAN 1
Structural Team following   Continue Plavix 75 mg PO daily    Start Toprol 100 mg PO daily.   Continue with Lipitor 10 mg PO HS    Increase activity as tolerated.   Encourage Chest PT / Pulmonary toileting and Incentive spirometry every 1 hour x 10 while awake.   Continue with Protonix 40 mg PO daily for PUD prophylaxis.   D/C plan home once medically cleared   Plan of care discussed with attending

## 2024-10-02 NOTE — PROGRESS NOTE ADULT - ASSESSMENT
85 y/o Male with PMHx significant for HTN, HLD, DM2 (managed with diet, not on any hyperglycemic meds), tachy-chelsea syndrome, baseline RBBB on EKG, Afib (on Eliquis, afib recently dx this past February), remote colon CA (26 years ago s/p colon resection and chemo), FOX/CKD, and spinal stenosis who reports a history of dyspnea on exertion with walking around his home. He underwent cardiac cath on 9/9/24 and had MARGIE to the mid LAD. He is now scheduled for TAVR on 9/25/24. He denies CP, dizziness, syncope, fever or chills.      9/25: s/p Right transfemoral TAVR 29mm Evolut Fx (+). Per Structural Heart NP,  pre-op RBBB, intermittent CHB intraoperatively, maintain TVP to external PPM overnight. Patient transferred to SDU, in no acute distress. Monitor groins. Daily EKG. Repeat TTE in AM. Plavix tonight, Eliquis tomorrow if no contraindication per Dr. Heaton.   9/26 PW to be d/c, no pacing overnight.   Will discuss medications with Dr. Heaton  9/27 afib 130's  3.4 second pause .  dw EP--will start amio load and observe on tele.  If any further pauses will possible require PPM.    9/28 Pt converted from SR to aflutter 130;s this afternoon. Continue with amiodarone load. No bradycardia of pauses on tele. Replete K and Mg. CXR r/o effusion  9/29 CXR clear, No bradycardia or AV block on tele. Anticipate discharge tomorrow  9/30 VSS; afib  92 amio load. creat to 1.78.  D/C diuretics   10/1 Completed amiodarone load 5G. Lopressor increased to 25 bid. House EP consulted as requested by Dr. Dejesus for possible cardioversion.   10/2 s/p PPM implant placement.  Tolerated well.  Started on Toprol 100 mg PO daily. Continue with current medication regimen.  Structural Team following.    Disposition: Home once medically cleared.

## 2024-10-03 VITALS
OXYGEN SATURATION: 97 % | HEART RATE: 71 BPM | SYSTOLIC BLOOD PRESSURE: 107 MMHG | TEMPERATURE: 97 F | DIASTOLIC BLOOD PRESSURE: 60 MMHG | RESPIRATION RATE: 18 BRPM

## 2024-10-03 LAB
ANION GAP SERPL CALC-SCNC: 15 MMOL/L — SIGNIFICANT CHANGE UP (ref 5–17)
BUN SERPL-MCNC: 32 MG/DL — HIGH (ref 7–23)
CALCIUM SERPL-MCNC: 9.4 MG/DL — SIGNIFICANT CHANGE UP (ref 8.4–10.5)
CHLORIDE SERPL-SCNC: 99 MMOL/L — SIGNIFICANT CHANGE UP (ref 96–108)
CO2 SERPL-SCNC: 21 MMOL/L — LOW (ref 22–31)
CREAT SERPL-MCNC: 1.65 MG/DL — HIGH (ref 0.5–1.3)
EGFR: 41 ML/MIN/1.73M2 — LOW
GLUCOSE BLDC GLUCOMTR-MCNC: 139 MG/DL — HIGH (ref 70–99)
GLUCOSE BLDC GLUCOMTR-MCNC: 156 MG/DL — HIGH (ref 70–99)
GLUCOSE SERPL-MCNC: 135 MG/DL — HIGH (ref 70–99)
HCT VFR BLD CALC: 40.4 % — SIGNIFICANT CHANGE UP (ref 39–50)
HGB BLD-MCNC: 13.5 G/DL — SIGNIFICANT CHANGE UP (ref 13–17)
MAGNESIUM SERPL-MCNC: 2.2 MG/DL — SIGNIFICANT CHANGE UP (ref 1.6–2.6)
MCHC RBC-ENTMCNC: 31.5 PG — SIGNIFICANT CHANGE UP (ref 27–34)
MCHC RBC-ENTMCNC: 33.4 GM/DL — SIGNIFICANT CHANGE UP (ref 32–36)
MCV RBC AUTO: 94.4 FL — SIGNIFICANT CHANGE UP (ref 80–100)
NRBC # BLD: 0 /100 WBCS — SIGNIFICANT CHANGE UP (ref 0–0)
PHOSPHATE SERPL-MCNC: 3.5 MG/DL — SIGNIFICANT CHANGE UP (ref 2.5–4.5)
PLATELET # BLD AUTO: 276 K/UL — SIGNIFICANT CHANGE UP (ref 150–400)
POTASSIUM SERPL-MCNC: 4.4 MMOL/L — SIGNIFICANT CHANGE UP (ref 3.5–5.3)
POTASSIUM SERPL-SCNC: 4.4 MMOL/L — SIGNIFICANT CHANGE UP (ref 3.5–5.3)
RBC # BLD: 4.28 M/UL — SIGNIFICANT CHANGE UP (ref 4.2–5.8)
RBC # FLD: 12 % — SIGNIFICANT CHANGE UP (ref 10.3–14.5)
SODIUM SERPL-SCNC: 135 MMOL/L — SIGNIFICANT CHANGE UP (ref 135–145)
WBC # BLD: 10.54 K/UL — HIGH (ref 3.8–10.5)
WBC # FLD AUTO: 10.54 K/UL — HIGH (ref 3.8–10.5)

## 2024-10-03 PROCEDURE — 71046 X-RAY EXAM CHEST 2 VIEWS: CPT | Mod: 26

## 2024-10-03 PROCEDURE — 93010 ELECTROCARDIOGRAM REPORT: CPT

## 2024-10-03 PROCEDURE — 99232 SBSQ HOSP IP/OBS MODERATE 35: CPT | Mod: FS

## 2024-10-03 RX ORDER — SPIRONOLACTONE 25 MG/1
0.5 TABLET, FILM COATED ORAL
Refills: 0 | DISCHARGE

## 2024-10-03 RX ORDER — METOPROLOL TARTRATE 50 MG
1 TABLET ORAL
Qty: 0 | Refills: 0 | DISCHARGE
Start: 2024-10-03

## 2024-10-03 RX ORDER — DILTIAZEM HYDROCHLORIDE 5 MG/ML
1 INJECTION INTRAVENOUS
Refills: 0 | DISCHARGE

## 2024-10-03 RX ORDER — METOPROLOL SUCCINATE 100 MG/1
100 TABLET, EXTENDED RELEASE ORAL DAILY
Refills: 0 | Status: ACTIVE | COMMUNITY

## 2024-10-03 RX ORDER — CLOPIDOGREL 75 MG/1
75 TABLET, FILM COATED ORAL
Refills: 0 | Status: ACTIVE | COMMUNITY

## 2024-10-03 RX ORDER — GLUCOSAMINE HCL/CHONDROITIN SU 500-400 MG
3 CAPSULE ORAL
Refills: 0 | Status: ACTIVE | COMMUNITY

## 2024-10-03 RX ORDER — APIXABAN 5 MG/1
1 TABLET, FILM COATED ORAL
Qty: 60 | Refills: 0
Start: 2024-10-03 | End: 2024-11-01

## 2024-10-03 RX ORDER — METOPROLOL TARTRATE 100 MG/1
1 TABLET ORAL
Refills: 0 | DISCHARGE

## 2024-10-03 RX ORDER — 5-HYDROXYTRYPTOPHAN (5-HTP) 100 MG
1 TABLET,DISINTEGRATING ORAL
Qty: 0 | Refills: 0 | DISCHARGE
Start: 2024-10-03

## 2024-10-03 RX ORDER — METOPROLOL TARTRATE 50 MG
1 TABLET ORAL
Qty: 30 | Refills: 0
Start: 2024-10-03 | End: 2024-11-01

## 2024-10-03 RX ORDER — FUROSEMIDE 40 MG
1 TABLET ORAL
Refills: 0 | DISCHARGE

## 2024-10-03 RX ADMIN — PANTOPRAZOLE SODIUM 40 MILLIGRAM(S): 40 TABLET, DELAYED RELEASE ORAL at 05:37

## 2024-10-03 RX ADMIN — CHLORHEXIDINE GLUCONATE ORAL RINSE 1 APPLICATION(S): 1.2 SOLUTION DENTAL at 11:24

## 2024-10-03 RX ADMIN — Medication 75 MILLIGRAM(S): at 11:23

## 2024-10-03 RX ADMIN — AMIODARONE HYDROCHLORIDE 200 MILLIGRAM(S): 50 INJECTION, SOLUTION INTRAVENOUS at 05:37

## 2024-10-03 RX ADMIN — Medication 100 MILLIGRAM(S): at 05:37

## 2024-10-03 RX ADMIN — APIXABAN 2.5 MILLIGRAM(S): 5 TABLET, FILM COATED ORAL at 05:37

## 2024-10-03 NOTE — PROGRESS NOTE ADULT - SUBJECTIVE AND OBJECTIVE BOX
24H hour events: Pt without complaint, no acute events overnight, Tele: SR with V pacing and intermittent AV pacing, HR 70-80's, had PAF yesterday around 11:15am    MEDICATIONS:  aMIOdarone    Tablet 200 milliGRAM(s) Oral daily  apixaban 2.5 milliGRAM(s) Oral every 12 hours  clopidogrel Tablet 75 milliGRAM(s) Oral daily  metoprolol succinate  milliGRAM(s) Oral daily  melatonin 3 milliGRAM(s) Oral at bedtime PRN  pantoprazole    Tablet 40 milliGRAM(s) Oral before breakfast  atorvastatin 10 milliGRAM(s) Oral at bedtime  dextrose 50% Injectable 12.5 Gram(s) IV Push once  dextrose 50% Injectable 25 Gram(s) IV Push once  dextrose 50% Injectable 25 Gram(s) IV Push once  dextrose Oral Gel 15 Gram(s) Oral once PRN  glucagon  Injectable 1 milliGRAM(s) IntraMuscular once  insulin lispro (ADMELOG) corrective regimen sliding scale   SubCutaneous three times a day before meals  insulin lispro (ADMELOG) corrective regimen sliding scale   SubCutaneous at bedtime  chlorhexidine 2% Cloths 1 Application(s) Topical <User Schedule>  dextrose 5%. 1000 milliLiter(s) IV Continuous <Continuous>  dextrose 5%. 1000 milliLiter(s) IV Continuous <Continuous>  influenza  Vaccine (HIGH DOSE) 0.5 milliLiter(s) IntraMuscular once  sodium chloride 0.9%. 1000 milliLiter(s) IV Continuous <Continuous>      REVIEW OF SYSTEMS:  Complete 12 point ROS negative.    PHYSICAL EXAM:  T(C): 36.8 (10-02-24 @ 19:55), Max: 36.8 (10-02-24 @ 19:55)  HR: 82 (10-03-24 @ 05:32) (69 - 128)  BP: 117/66 (10-03-24 @ 05:32) (117/66 - 156/76)  RR: 18 (10-03-24 @ 05:32) (14 - 18)  SpO2: 100% (10-03-24 @ 05:32) (96% - 100%)  Wt(kg): --  I&O's Summary    02 Oct 2024 07:01  -  03 Oct 2024 07:00  --------------------------------------------------------  IN: 885 mL / OUT: 0 mL / NET: 885 mL    03 Oct 2024 07:01  -  03 Oct 2024 10:23  --------------------------------------------------------  IN: 240 mL / OUT: 450 mL / NET: -210 mL        Appearance: Normal	  HEENT: PERRL, EOMI	  Cardiovascular: Normal S1 S2, No JVD, No murmurs  Respiratory: Lungs clear to auscultation	  Psychiatry: A & O x 3, Mood & affect appropriate  Gastrointestinal: Soft, Non-tender, + BS	  Skin: Left chest wall PPM site C/D/I (no hematoma)	  Neurologic: Grossly intact  Extremities: No clubbing, cyanosis or edema  Vascular: Peripheral pulses palpable 2+ bilaterally        LABS:	 	    CBC Full  -  ( 03 Oct 2024 06:55 )  WBC Count : 10.54 K/uL  Hemoglobin : 13.5 g/dL  Hematocrit : 40.4 %  Platelet Count - Automated : 276 K/uL  Mean Cell Volume : 94.4 fl  Mean Cell Hemoglobin : 31.5 pg  Mean Cell Hemoglobin Concentration : 33.4 gm/dL  Auto Neutrophil # : x  Auto Lymphocyte # : x  Auto Monocyte # : x  Auto Eosinophil # : x  Auto Basophil # : x  Auto Neutrophil % : x  Auto Lymphocyte % : x  Auto Monocyte % : x  Auto Eosinophil % : x  Auto Basophil % : x    10-03    135  |  99  |  32[H]  ----------------------------<  135[H]  4.4   |  21[L]  |  1.65[H]  10-02    134[L]  |  100  |  36[H]  ----------------------------<  151[H]  4.1   |  20[L]  |  1.72[H]    Ca    9.4      03 Oct 2024 06:55  Ca    9.3      02 Oct 2024 06:30  Phos  3.5     10-03  Mg     2.2     10-03    TSH: 2.49  T4: 7.2  T3: 76      TELEMETRY: SR with V pacing and intermittent AV pacing, HR 70-80's, had PAF yesterday around 11:15am	      < from: TTE W or WO Ultrasound Enhancing Agent (09.26.24 @ 09:42) >  CONCLUSIONS:      1. Left ventricular systolic function is normal with an ejection fraction of 71 % by Avila's method of disks.   2. Probably normal right ventricular systolic function. Tricuspid annular plane systolic excursion (TAPSE) is 2.8 cm (normal >=1.7 cm).   3. No pericardial effusion seen.   4. 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 No prosthetic aortic stenosis. No intravalvular regurgitation No paravalvular regurgitation.   5. Compared to the transthoracic echocardiogram performed on 9/25/2024, s/p TAVR.   6. Moderate mitral valve stenosis.   7. Technically difficult image quality.   8. There is moderate calcification of the mitral valve annulus.   9. There is no evidence of a left ventricular thrombus.    ________________________________________________________________________________________  FINDINGS:     Left Ventricle:  Left ventricular wall thickness is normal. Left ventricular systolic function is normal with a calculated ejection fraction of 71 % by the Avila's biplane method of disks. There are no regional wall motion abnormalities seen. There is no evidence of a left ventricular thrombus. Analysis of left ventricular diastolic function and filling pressure is made challenging by the presence of mitral annular calcification.     Right Ventricle:  The right ventricle is not well visualized. Right ventricular systolic function is probably normal. Tricuspid annular plane systolic excursion (TAPSE) is 2.8 cm (normal >=1.7 cm).     Left Atrium:  The left atrium is normal in size with an indexed volume of 30.27 ml/m².     Right Atrium:  The right atrium is normal in size with an indexed volume of 22.63 ml/m².     Interatrial Septum:  The interatrial septum appears intact.     Aortic Valve:  A 29mm Evolut FX+ (TAVR) is present in the aortic position. The prosthetic valve is well seated with normal function. There is no prosthetic aortic stenosis. There is no intravalvular regurgitation. There is no paravalvular regurgitation. The peak transaortic velocity is 2.53 m/s, peak transaortic gradient is 25.6 mmHg and mean transaortic gradient is 13.0 mmHg with an LVOT/aortic valve VTI ratio of 0.53.     Mitral Valve:  There is moderate calcification of the mitral valve annulus. Mitral valve leaflets are diffusely calcified. There is moderate mitral valve stenosis, secondary to dystrophic mitral annular calcification. The transmitral peak gradient is 18.1 mmHg and mean gradient is 6.00 mmHg at a heart rate of 94 bpm. There is trace mitral regurgitation.     Tricuspid Valve:  Structurally normal tricuspid valve with normal leaflet excursion. The tricuspid valve was not well visualized. There is trace tricuspid regurgitation. There is insufficient tricuspid regurgitation detected to calculate pulmonary artery systolic pressure.     Pericardium:  No pericardial effusion seen.     Systemic Veins:  The inferior vena cava is normal in size measuring 1.40 cm in diameter, (normal <2.1cm) with normal inspiratory collapse (normal >50%) consistent with normal right atrial pressure (~3, range 0-5mmHg).  ____________________________________________________________________  QUANTITATIVE DATA:  Left Ventricle Measurements: (Indexed to BSA)     IVSd (2D):   0.9 cm  LVPWd (2D):  1.2 cm  LVIDd (2D):  5.1 cm  LV Mass:     201 g  84.3 g/m²  LV Vol d, MOD A2C: 84.6 ml 35.52 ml/m²  LV Vol d, MOD A4C: 99.7 ml 41.86 ml/m²  LV Vol d, MOD BP:  94.6 ml 39.73 ml/m²  LV Vol s, MOD A2C: 20.6 ml 8.65 ml/m²  LV Vol s, MOD A4C: 38.7 ml 16.25 ml/m²  LV Vol s,MOD BP:  27.1 ml 11.40 ml/m²  LVOT SV MOD BP:    67.5 ml  LV EF% MOD BP:     71 %     MV E Vmax:    0.81 m/s  MV A Vmax:    1.70 m/s  MV E/A:       0.48  e' lateral:   5.55 cm/s  e' medial:    3.00 cm/s  E/e' lateral: 14.67  E/e' medial:  27.13  E/e'Average: 19.04    Aorta Measurements: (Normal range) (Indexed to BSA)     Ao Root d (3.1 - 3.7 cm)            Left Atrium Measurements: (Indexed to BSA)  LA Diam 2D: 5.00 cm         Right Ventricle Measurements: Right Atrial Measurements:     TAPSE:         2.8 cm       RA Vol:       53.90 ml  RV S' Vmax:      16.10 cm/s   RA Vol Index: 22.63 ml/m²  RV Base (RVID1): 4.6 cm       LVOT / RVOT/ Qp/Qs Data: (Indexed to BSA)  LVOT Vmax:      1.38 m/s  LVOT Vmn:       0.933 m/s  LVOT VTI:       23.90cm  LVOT peak grad: 8 mmHg  LVOT mean grad: 4.0 mmHg    Aortic Valve Measurements:  AV Vmax:                2.5 m/s  AV Peak Gradient:       25.6 mmHg  AV Mean Gradient:       13.0 mmHg  AV VTI:                 45.3 cm  AV VTI Ratio:           0.53  AoV Dimensionless Index 0.53    Mitral Valve Measurements:     MV Vmax:        2.13 m/s  MV VTI, catracho     0.577 m  MV Mean Grad:   6.0 mmHg  MV Peak Grad:   18.1 mmHg  MV E Vmax:      0.8 m/s  MV A Vmax:      1.7 m/s  MV E/A:         0.5  MV Gradient HR: 94 bpm       Tricuspid Valve Measurements:     RA Pressure: 3 mmHg    < end of copied text >

## 2024-10-03 NOTE — PROGRESS NOTE ADULT - ASSESSMENT
83 y/o Male with PMHx significant for HTN, HLD, DM2 (managed with diet, not on any meds),  tachy-chelsea syndrome, baseline RBBB on EKG, Afib (on Eliquis, afib recently dx this past February), remote colon CA (26 years ago s/p colon resection and chemo), FOX/CKD, and spinal stenosis who reports a history of dyspnea on exertion with walking around his home. He underwent cardiac cath on 9/9/24 and had MARGIE to the mid LAD.     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  Started 9/27/28 on amiodarone. He had been in AF since 9/29 ~430AM. He has been on therapeutic Eliquis since 9/28.     #TAVR 9/25/24  #1st degree AVB, RBBB/LPFB  #AF with RVR  #Tachy-chelsea syndrome      Recommendations:  -s/p dual chamber [employer] PPM with Dr Gu on 10/2/24.   -Eliquis resumed last night. Pt has been on this long term, it was a prior to admission medication for his known history of PAF. On 2.5mg BID as age>80 and Cr>1.5. Moderate mitral stenosis noted on YOSELYN 9/26 however no mitral stenosis noted on YOSELYN from 9/25. As this is a long term medication, will keep Eliquis at this time for OAC. Warfarin vs Eliquis risk/benefit discussed with patient on 10/2 and he is ok with proceeding with Eliquis.  -Continue Toprol XL and amiodarone 200mg daily  -Post PPM instruction re-enforced with the pt and his sister.   -PPM booklet, Fact sheet, Temp ID card and follow up appointment card given to the patient on 10/2  -PA/LA CXR revealed good device placement, dual chamber PPM in place  -Post op PPM interrogated and paired with remote monitor by [employer] rep this am; normal device function   -D/w patient on 10/2 that the plan will be for short term amiodarone. Explained risk of amiodarone side effects of eyes, liver, lungs and thyroid and need for OP follow up. Will f/u with Dr Gu to discuss AF ablation in the future  -[facility] appt details (10/11/24 at 2:40PM)  -Ok for discharge from EP perspective  -D/w primary team and EP attending    MARLA Oliver NP-C  [phone]

## 2024-10-03 NOTE — PROGRESS NOTE ADULT - PROVIDER SPECIALTY LIST ADULT
CT Surgery
Electrophysiology
CT Surgery
CT Surgery
Electrophysiology
CT Surgery
CT Surgery
Electrophysiology
CT Surgery

## 2024-10-04 ENCOUNTER — APPOINTMENT (OUTPATIENT)
Dept: CARE COORDINATION | Facility: HOME HEALTH | Age: 84
End: 2024-10-04

## 2024-10-04 ENCOUNTER — TRANSCRIPTION ENCOUNTER (OUTPATIENT)
Age: 84
End: 2024-10-04

## 2024-10-04 PROBLEM — Z95.2 S/P TAVR (TRANSCATHETER AORTIC VALVE REPLACEMENT): Status: ACTIVE | Noted: 2024-10-04

## 2024-10-04 NOTE — REASON FOR VISIT
[Home] : at home, [unfilled] , at the time of the visit. [Other Location: e.g. Home (Enter Location, City,State)___] : at [unfilled] [Family Member] : family member [Verbal consent obtained from patient] : the patient, [unfilled] [FreeTextEntry4] : Sabi Valerio

## 2024-10-04 NOTE — HISTORY OF PRESENT ILLNESS
[FreeTextEntry1] : FOLLOW YOUR HEART HOME VISIT-[facility] Telephonic Home Visit made to Mr. Rodriguez for post discharge transitional care management and post follow up.      Patient of Dr. Ron s/p TAVR on 9/25, s/p PPM 10/2/24. Discharge on 10/3/24 from [facility]  Mr. Rodriguez is a 84 year old male with PMHx significant for HTN, HLD, DM2 (managed with diet, not on any hyperglycemic meds), tachy-chelsea syndrome, baseline RBBB on EKG, Afib (on Eliquis, afib recently dx this past February), remote colon CA (26 years ago s/p colon resection and chemo), FOX/CKD, and spinal stenosis who reports a history of dyspnea on exertion with walking around his home. He underwent s/p TAVR on 9/25/24. Post-op coarse afib with 3.4 pause. 10/2 s/p PPM insertion.  On 10/3 discharged to home diuretics on hold due to elevated creatinine level. Spoke with patient & sister (Sabi) for follow up s/p hospitalization. Denies any new complaints/issues at this time. Compliant with all medications as per d/c order with +teach back. HCS initiated with (name of home care).  Patient has scheduled follow up appointment. Reminded of CN role and contact number was provided to the patient.  Advised to call with any questions/concerns, verbalized understanding.

## 2024-10-04 NOTE — PLAN
[TextEntry] : Post operative teaching reinforced, daily weights, showering daily, no heavy lifting greater than 5 pounds, no driving until seen by surgeon.  Obtain temperature daily. Diabetes glucose control. Medication adherence. Encouraged the use of incentive spirometry 10 times every hour.  Care Navigator contact information provided & yellow card reinforced.  Patient was encouraged to call Care Navigator with any issues, concerns, or questions.   1. Daily Shower 2. Weight yourself daily and notify any weight gain greater than 2-3 pounds in 24 hours. 3. Regular diet - low fat, low cholesterol, no added salt. 4. Cleanse leg incision daily while showering with warm water and mild soap, pat dry and maintain open to air.  DO NOT APPLY ANY LOTION, CREAMS, OR POWDERS TO INCISIONS, KEEP OPEN TO AIR 5. No driving until cleared by MD.  6. No heavy lifting nothing greater than 5 pounds until cleared by MD.  7. Call / Notify MD any fever greater than 101.0 8. Increase Activity as tolerated. 9. Utilize heart pillow to splint pillow

## 2024-10-08 ENCOUNTER — APPOINTMENT (OUTPATIENT)
Dept: CARDIOTHORACIC SURGERY | Facility: CLINIC | Age: 84
End: 2024-10-08
Payer: MEDICARE

## 2024-10-08 ENCOUNTER — APPOINTMENT (OUTPATIENT)
Dept: ELECTROPHYSIOLOGY | Facility: CLINIC | Age: 84
End: 2024-10-08

## 2024-10-08 VITALS
DIASTOLIC BLOOD PRESSURE: 74 MMHG | BODY MASS INDEX: 32.51 KG/M2 | RESPIRATION RATE: 18 BRPM | HEIGHT: 72 IN | SYSTOLIC BLOOD PRESSURE: 129 MMHG | TEMPERATURE: 97.6 F | HEART RATE: 77 BPM | WEIGHT: 240 LBS

## 2024-10-08 DIAGNOSIS — Z95.2 PRESENCE OF PROSTHETIC HEART VALVE: ICD-10-CM

## 2024-10-08 PROCEDURE — 93000 ELECTROCARDIOGRAM COMPLETE: CPT | Mod: 59

## 2024-10-08 PROCEDURE — 99024 POSTOP FOLLOW-UP VISIT: CPT

## 2024-10-08 PROCEDURE — 99213 OFFICE O/P EST LOW 20 MIN: CPT

## 2024-10-08 PROCEDURE — 93280 PM DEVICE PROGR EVAL DUAL: CPT

## 2024-10-09 RX ORDER — AMIODARONE HYDROCHLORIDE 200 MG/1
200 TABLET ORAL
Qty: 90 | Refills: 1 | Status: COMPLETED | COMMUNITY
Start: 2024-10-09 | End: 2024-10-09

## 2024-10-09 RX ORDER — AMIODARONE HYDROCHLORIDE 200 MG/1
200 TABLET ORAL DAILY
Qty: 90 | Refills: 3 | Status: COMPLETED | COMMUNITY
Start: 2024-10-09 | End: 2024-10-09

## 2024-10-09 RX ORDER — AMIODARONE HYDROCHLORIDE 200 MG/1
200 TABLET ORAL
Qty: 90 | Refills: 1 | Status: ACTIVE | COMMUNITY
Start: 2024-10-09 | End: 1900-01-01

## 2024-10-09 NOTE — CHART NOTE - NSCHARTNOTEFT_GEN_A_CORE
The patient was noted to be in an atrial tachycardia on ECG. The [employer] was used to communicate with his recently placed dual chamber pacemaker. EP test mode was entered. The atrial cycle length was 250 ms. Serial pacing from 230 down to 190 ms was performed with termination of the tachycardia to sinus after pacing at 190 ms. Atrial pacing ensued. The atrial lead threshold was tested and found to be 0.8V at 0.5 ms (could not be tested at implant). The patient will be on amiodarone 200 mg po daily and continue on apixaban 5 mg po bid. detailed exam

## 2024-10-11 ENCOUNTER — NON-APPOINTMENT (OUTPATIENT)
Age: 84
End: 2024-10-11

## 2024-11-26 PROCEDURE — 86850 RBC ANTIBODY SCREEN: CPT

## 2024-11-26 PROCEDURE — C1785: CPT

## 2024-11-26 PROCEDURE — C1898: CPT

## 2024-11-26 PROCEDURE — C1892: CPT

## 2024-11-26 PROCEDURE — 84480 ASSAY TRIIODOTHYRONINE (T3): CPT

## 2024-11-26 PROCEDURE — 33208 INSRT HEART PM ATRIAL & VENT: CPT

## 2024-11-26 PROCEDURE — 84436 ASSAY OF TOTAL THYROXINE: CPT

## 2024-11-26 PROCEDURE — C1894: CPT

## 2024-11-26 PROCEDURE — 84443 ASSAY THYROID STIM HORMONE: CPT

## 2024-11-26 PROCEDURE — C1769: CPT

## 2024-11-26 PROCEDURE — 85025 COMPLETE CBC W/AUTO DIFF WBC: CPT

## 2024-11-26 PROCEDURE — 93306 TTE W/DOPPLER COMPLETE: CPT

## 2024-11-26 PROCEDURE — 80048 BASIC METABOLIC PNL TOTAL CA: CPT

## 2024-11-26 PROCEDURE — 83735 ASSAY OF MAGNESIUM: CPT

## 2024-11-26 PROCEDURE — 86900 BLOOD TYPING SEROLOGIC ABO: CPT

## 2024-11-26 PROCEDURE — 86901 BLOOD TYPING SEROLOGIC RH(D): CPT

## 2024-11-26 PROCEDURE — 97116 GAIT TRAINING THERAPY: CPT

## 2024-11-26 PROCEDURE — C1887: CPT

## 2024-11-26 PROCEDURE — 36415 COLL VENOUS BLD VENIPUNCTURE: CPT

## 2024-11-26 PROCEDURE — 76000 FLUOROSCOPY <1 HR PHYS/QHP: CPT

## 2024-11-26 PROCEDURE — 97530 THERAPEUTIC ACTIVITIES: CPT

## 2024-11-26 PROCEDURE — 85610 PROTHROMBIN TIME: CPT

## 2024-11-26 PROCEDURE — 93005 ELECTROCARDIOGRAM TRACING: CPT

## 2024-11-26 PROCEDURE — C1760: CPT

## 2024-11-26 PROCEDURE — C8929: CPT

## 2024-11-26 PROCEDURE — 80053 COMPREHEN METABOLIC PANEL: CPT

## 2024-11-26 PROCEDURE — 85027 COMPLETE CBC AUTOMATED: CPT

## 2024-11-26 PROCEDURE — 84100 ASSAY OF PHOSPHORUS: CPT

## 2024-11-26 PROCEDURE — 85730 THROMBOPLASTIN TIME PARTIAL: CPT

## 2024-11-26 PROCEDURE — 71046 X-RAY EXAM CHEST 2 VIEWS: CPT

## 2024-11-26 PROCEDURE — C1889: CPT

## 2024-11-26 PROCEDURE — 71045 X-RAY EXAM CHEST 1 VIEW: CPT

## 2024-11-26 PROCEDURE — 97162 PT EVAL MOD COMPLEX 30 MIN: CPT

## 2024-11-26 PROCEDURE — 82962 GLUCOSE BLOOD TEST: CPT

## 2024-12-12 ENCOUNTER — APPOINTMENT (OUTPATIENT)
Dept: ELECTROPHYSIOLOGY | Facility: CLINIC | Age: 84
End: 2024-12-12

## 2024-12-12 ENCOUNTER — NON-APPOINTMENT (OUTPATIENT)
Age: 84
End: 2024-12-12

## 2024-12-12 VITALS
RESPIRATION RATE: 14 BRPM | WEIGHT: 240 LBS | HEART RATE: 71 BPM | OXYGEN SATURATION: 98 % | BODY MASS INDEX: 32.51 KG/M2 | DIASTOLIC BLOOD PRESSURE: 75 MMHG | SYSTOLIC BLOOD PRESSURE: 145 MMHG | HEIGHT: 72 IN

## 2024-12-12 DIAGNOSIS — I35.0 NONRHEUMATIC AORTIC (VALVE) STENOSIS: ICD-10-CM

## 2024-12-12 DIAGNOSIS — N18.9 CHRONIC KIDNEY DISEASE, UNSPECIFIED: ICD-10-CM

## 2024-12-12 DIAGNOSIS — I48.0 PAROXYSMAL ATRIAL FIBRILLATION: ICD-10-CM

## 2024-12-12 DIAGNOSIS — Z95.2 PRESENCE OF PROSTHETIC HEART VALVE: ICD-10-CM

## 2024-12-12 DIAGNOSIS — R06.00 DYSPNEA, UNSPECIFIED: ICD-10-CM

## 2024-12-12 PROCEDURE — 99213 OFFICE O/P EST LOW 20 MIN: CPT | Mod: 24

## 2024-12-12 PROCEDURE — 93000 ELECTROCARDIOGRAM COMPLETE: CPT | Mod: 59

## 2024-12-12 PROCEDURE — 93280 PM DEVICE PROGR EVAL DUAL: CPT

## 2025-01-27 ENCOUNTER — NON-APPOINTMENT (OUTPATIENT)
Age: 85
End: 2025-01-27

## 2025-01-27 ENCOUNTER — APPOINTMENT (OUTPATIENT)
Dept: ELECTROPHYSIOLOGY | Facility: CLINIC | Age: 85
End: 2025-01-27

## 2025-01-27 ENCOUNTER — RESULT CHARGE (OUTPATIENT)
Age: 85
End: 2025-01-27

## 2025-01-27 VITALS
HEART RATE: 71 BPM | BODY MASS INDEX: 32.51 KG/M2 | SYSTOLIC BLOOD PRESSURE: 164 MMHG | OXYGEN SATURATION: 97 % | WEIGHT: 240 LBS | HEIGHT: 72 IN | DIASTOLIC BLOOD PRESSURE: 73 MMHG

## 2025-01-27 PROCEDURE — 93280 PM DEVICE PROGR EVAL DUAL: CPT

## 2025-01-27 PROCEDURE — 93000 ELECTROCARDIOGRAM COMPLETE: CPT | Mod: 59

## 2025-03-10 ENCOUNTER — NON-APPOINTMENT (OUTPATIENT)
Age: 85
End: 2025-03-10

## 2025-03-26 ENCOUNTER — APPOINTMENT (OUTPATIENT)
Dept: ELECTROPHYSIOLOGY | Facility: CLINIC | Age: 85
End: 2025-03-26
Payer: MEDICARE

## 2025-03-26 VITALS
SYSTOLIC BLOOD PRESSURE: 183 MMHG | BODY MASS INDEX: 32.51 KG/M2 | WEIGHT: 240 LBS | DIASTOLIC BLOOD PRESSURE: 70 MMHG | HEIGHT: 72 IN | HEART RATE: 97 BPM | OXYGEN SATURATION: 96 %

## 2025-03-26 DIAGNOSIS — I48.91 UNSPECIFIED ATRIAL FIBRILLATION: ICD-10-CM

## 2025-03-26 DIAGNOSIS — I45.4 NONSPECIFIC INTRAVENTRICULAR BLOCK: ICD-10-CM

## 2025-03-26 DIAGNOSIS — I48.0 PAROXYSMAL ATRIAL FIBRILLATION: ICD-10-CM

## 2025-03-26 DIAGNOSIS — Z95.2 PRESENCE OF PROSTHETIC HEART VALVE: ICD-10-CM

## 2025-03-26 PROCEDURE — 93000 ELECTROCARDIOGRAM COMPLETE: CPT | Mod: 59

## 2025-03-26 PROCEDURE — 99213 OFFICE O/P EST LOW 20 MIN: CPT

## 2025-03-26 PROCEDURE — 93280 PM DEVICE PROGR EVAL DUAL: CPT

## 2025-04-15 ENCOUNTER — APPOINTMENT (OUTPATIENT)
Dept: CARDIOLOGY | Facility: CLINIC | Age: 85
End: 2025-04-15

## 2025-05-26 ENCOUNTER — RX RENEWAL (OUTPATIENT)
Age: 85
End: 2025-05-26

## 2025-06-05 ENCOUNTER — RX RENEWAL (OUTPATIENT)
Age: 85
End: 2025-06-05

## 2025-06-19 ENCOUNTER — NON-APPOINTMENT (OUTPATIENT)
Age: 85
End: 2025-06-19

## 2025-06-25 ENCOUNTER — NON-APPOINTMENT (OUTPATIENT)
Age: 85
End: 2025-06-25

## 2025-06-25 ENCOUNTER — APPOINTMENT (OUTPATIENT)
Dept: ELECTROPHYSIOLOGY | Facility: CLINIC | Age: 85
End: 2025-06-25

## 2025-06-25 PROCEDURE — 93296 REM INTERROG EVL PM/IDS: CPT

## 2025-06-25 PROCEDURE — 93294 REM INTERROG EVL PM/LDLS PM: CPT

## 2025-07-04 ENCOUNTER — RX RENEWAL (OUTPATIENT)
Age: 85
End: 2025-07-04

## 2025-07-08 PROBLEM — Z01.818 PREOP TESTING: Status: ACTIVE | Noted: 2025-07-08

## 2025-07-14 ENCOUNTER — TRANSCRIPTION ENCOUNTER (OUTPATIENT)
Age: 85
End: 2025-07-14

## 2025-07-14 ENCOUNTER — OUTPATIENT (OUTPATIENT)
Dept: OUTPATIENT SERVICES | Facility: HOSPITAL | Age: 85
LOS: 1 days | End: 2025-07-14
Payer: MEDICARE

## 2025-07-14 VITALS — HEART RATE: 70 BPM | WEIGHT: 250 LBS | SYSTOLIC BLOOD PRESSURE: 167 MMHG | DIASTOLIC BLOOD PRESSURE: 74 MMHG

## 2025-07-14 VITALS — SYSTOLIC BLOOD PRESSURE: 161 MMHG | DIASTOLIC BLOOD PRESSURE: 72 MMHG | OXYGEN SATURATION: 95 % | HEART RATE: 76 BPM

## 2025-07-14 DIAGNOSIS — Z95.5 PRESENCE OF CORONARY ANGIOPLASTY IMPLANT AND GRAFT: Chronic | ICD-10-CM

## 2025-07-14 DIAGNOSIS — Z92.21 PERSONAL HISTORY OF ANTINEOPLASTIC CHEMOTHERAPY: Chronic | ICD-10-CM

## 2025-07-14 DIAGNOSIS — I48.0 PAROXYSMAL ATRIAL FIBRILLATION: ICD-10-CM

## 2025-07-14 DIAGNOSIS — Z90.49 ACQUIRED ABSENCE OF OTHER SPECIFIED PARTS OF DIGESTIVE TRACT: Chronic | ICD-10-CM

## 2025-07-14 DIAGNOSIS — Z95.2 PRESENCE OF PROSTHETIC HEART VALVE: Chronic | ICD-10-CM

## 2025-07-14 DIAGNOSIS — Z95.0 PRESENCE OF CARDIAC PACEMAKER: Chronic | ICD-10-CM

## 2025-07-14 LAB
ANION GAP SERPL CALC-SCNC: 12 MMOL/L — SIGNIFICANT CHANGE UP (ref 5–17)
BLD GP AB SCN SERPL QL: NEGATIVE — SIGNIFICANT CHANGE UP
BUN SERPL-MCNC: 28 MG/DL — HIGH (ref 7–23)
CALCIUM SERPL-MCNC: 9.5 MG/DL — SIGNIFICANT CHANGE UP (ref 8.4–10.5)
CHLORIDE SERPL-SCNC: 108 MMOL/L — SIGNIFICANT CHANGE UP (ref 96–108)
CO2 SERPL-SCNC: 24 MMOL/L — SIGNIFICANT CHANGE UP (ref 22–31)
CREAT SERPL-MCNC: 1.6 MG/DL — HIGH (ref 0.5–1.3)
EGFR: 42 ML/MIN/1.73M2 — LOW
EGFR: 42 ML/MIN/1.73M2 — LOW
GLUCOSE BLDC GLUCOMTR-MCNC: 127 MG/DL — HIGH (ref 70–99)
GLUCOSE BLDC GLUCOMTR-MCNC: 132 MG/DL — HIGH (ref 70–99)
GLUCOSE BLDC GLUCOMTR-MCNC: 136 MG/DL — HIGH (ref 70–99)
GLUCOSE SERPL-MCNC: 149 MG/DL — HIGH (ref 70–99)
POTASSIUM SERPL-MCNC: 4.8 MMOL/L — SIGNIFICANT CHANGE UP (ref 3.5–5.3)
POTASSIUM SERPL-MCNC: 4.8 MMOL/L — SIGNIFICANT CHANGE UP (ref 3.5–5.3)
POTASSIUM SERPL-SCNC: 4.8 MMOL/L — SIGNIFICANT CHANGE UP (ref 3.5–5.3)
POTASSIUM SERPL-SCNC: 4.8 MMOL/L — SIGNIFICANT CHANGE UP (ref 3.5–5.3)
RH IG SCN BLD-IMP: NEGATIVE — SIGNIFICANT CHANGE UP
SODIUM SERPL-SCNC: 144 MMOL/L — SIGNIFICANT CHANGE UP (ref 135–145)

## 2025-07-14 PROCEDURE — 84132 ASSAY OF SERUM POTASSIUM: CPT

## 2025-07-14 PROCEDURE — 86900 BLOOD TYPING SEROLOGIC ABO: CPT

## 2025-07-14 PROCEDURE — 93655 ICAR CATH ABLTJ DSCRT ARRHYT: CPT

## 2025-07-14 PROCEDURE — 93656 COMPRE EP EVAL ABLTJ ATR FIB: CPT

## 2025-07-14 PROCEDURE — 93010 ELECTROCARDIOGRAM REPORT: CPT | Mod: 77

## 2025-07-14 PROCEDURE — 82962 GLUCOSE BLOOD TEST: CPT

## 2025-07-14 PROCEDURE — 93010 ELECTROCARDIOGRAM REPORT: CPT

## 2025-07-14 PROCEDURE — 86901 BLOOD TYPING SEROLOGIC RH(D): CPT

## 2025-07-14 PROCEDURE — C1759: CPT

## 2025-07-14 PROCEDURE — 93657 TX L/R ATRIAL FIB ADDL: CPT

## 2025-07-14 PROCEDURE — C1894: CPT

## 2025-07-14 PROCEDURE — C1766: CPT

## 2025-07-14 PROCEDURE — 86850 RBC ANTIBODY SCREEN: CPT

## 2025-07-14 PROCEDURE — C1733: CPT

## 2025-07-14 PROCEDURE — C1730: CPT

## 2025-07-14 PROCEDURE — 93005 ELECTROCARDIOGRAM TRACING: CPT

## 2025-07-14 PROCEDURE — 80048 BASIC METABOLIC PNL TOTAL CA: CPT

## 2025-07-14 PROCEDURE — C9399: CPT

## 2025-07-14 RX ORDER — SPIRONOLACTONE 25 MG
0.5 TABLET ORAL
Refills: 0 | DISCHARGE

## 2025-07-14 RX ORDER — METOPROLOL SUCCINATE 50 MG/1
100 TABLET, EXTENDED RELEASE ORAL DAILY
Refills: 0 | Status: DISCONTINUED | OUTPATIENT
Start: 2025-07-14 | End: 2025-07-28

## 2025-07-14 RX ORDER — ATORVASTATIN CALCIUM 80 MG/1
1 TABLET, FILM COATED ORAL
Refills: 0 | DISCHARGE

## 2025-07-14 RX ORDER — APIXABAN 5 MG/1
2.5 TABLET, FILM COATED ORAL EVERY 12 HOURS
Refills: 0 | Status: DISCONTINUED | OUTPATIENT
Start: 2025-07-14 | End: 2025-07-28

## 2025-07-14 RX ORDER — DILTIAZEM HYDROCHLORIDE 240 MG/1
1 TABLET, EXTENDED RELEASE ORAL
Refills: 0 | DISCHARGE

## 2025-07-14 RX ADMIN — METOPROLOL SUCCINATE 100 MILLIGRAM(S): 50 TABLET, EXTENDED RELEASE ORAL at 16:45

## 2025-07-14 RX ADMIN — APIXABAN 2.5 MILLIGRAM(S): 5 TABLET, FILM COATED ORAL at 16:45

## 2025-07-14 NOTE — ASU DISCHARGE PLAN (ADULT/PEDIATRIC) - ASU DC SPECIAL INSTRUCTIONSFT
WOUND CARE:  The day AFTER your procedure  - Remove the bandage at the site GENTLY, clean with mild soap and water, and pat dry; leave open to air  - You may shower   - DO NOT apply lotions, creams, ointments, powder, perfumes to your incision site  - Check your groin every day. A small amount of bruising or soreness is normal, a bump (smaller than nickel) might be present, which is normal  - DO NOT SOAK your site for 1 week (no baths, no pools, no tubs, etc..)    ACTIVITY:  Your procedure was done through your groin  For the next 5 DAYS:  - Limit climbing stairs, no strenuous activity, pushing , pulling, or straining     DO NOT LIFT anything 10 lbs or heavier   - You may resume sexual activity in 7 days, unless instructed otherwise  - Mild palpitations are normal     Follow heart healthy diet recommended by your doctor.   IF you smoke, STOP SMOKING (may call [phone] for center of tobacco control if you need assistance).    For the next 24 hours:   - Stay at home and rest, do not drive or operate heavy machinery  - Do not drink alcoholic beverages   - Do not make important personal or business decisions     ***CALL YOUR DOCTOR ***  IF you have fever, chills, body aches, or severe pain, swelling, redness, heat, yellow drainage from your incision site  IF bleeding or significant new swelling from your puncture site  IF you experience rapid heartbeat or palpitations that cause: lightheadedness, dizziness, or fainting spells  If you experience difficulty swallowing, or pain with swallowing   IF unable to get in contact with your doctor, you may call the Cardiology Office at [facility] at [phone]

## 2025-07-14 NOTE — PATIENT PROFILE ADULT - FALL HARM RISK - RISK INTERVENTIONS

## 2025-07-14 NOTE — ASU DISCHARGE PLAN (ADULT/PEDIATRIC) - FINANCIAL ASSISTANCE
[facility] provides services at a reduced cost to those who are determined to be eligible through [facility]’s financial assistance program. Information regarding [facility]’s financial assistance program can be found by going to [email or web address] or by calling [phone].

## 2025-07-14 NOTE — H&P CARDIOLOGY - NSICDXPASTSURGICALHX_GEN_ALL_CORE_FT
PAST SURGICAL HISTORY:  History of chemotherapy     Presence of permanent cardiac pacemaker     S/P colon resection     S/P TAVR (transcatheter aortic valve replacement)     Stented coronary artery

## 2025-07-14 NOTE — H&P CARDIOLOGY - HISTORY OF PRESENT ILLNESS
86 y/o Male with PMHx significant for CAD s/p cardiac cath on 9/9/24 and had MARGIE to the mid LAD, AS s/p 9/25: s/p Right transfemoral TAVR 29mm Evolut Fx(+)  complicated by intermittent CHF and AF with RVR with 3-4 second conversion pauses s/p [place] [facility] Dual chamber PPM HTN, HLD, DM2, tachy-chelsea syndrome, baseline RBBB on EKG, Afib (on Eliquis last dose was 7/13PM ), remote colon CA (26 years ago s/p colon resection and chemo), FOX/CKD, and spinal stenosis presents as outpatient for PAF/PFA Ablation.  Uses a cane   referring MD Dr. Jamie Dejesus         86 y/o Male with PMHx significant for CAD s/p cardiac cath on 9/9/24 and had MARGIE to the mid LAD, AS s/p 9/25: s/p Right transfemoral TAVR 29mm Evolut Fx(+)  complicated by intermittent CHF and AF with RVR with 3-4 second conversion pauses s/p [place] Sci Dual chamber PPM HTN, HLD, DM2, tachy-chelsea syndrome, baseline RBBB on EKG, Afib (on Eliquis last dose was 7/13PM ), remote colon CA (26 years ago s/p colon resection and chemo), FOX/CKD, and spinal stenosis presents as outpatient for PAF/PFA Ablation. Per outpatient note:  Since being off amiodarone, his AF burden has been increasing. He saw Dr. Gu in March 2025 and ablation was recommended but at that time patient was hesitant to proceed. 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 He was asymptomatic but he is now interested in ablation.   Uses a cane   referring MD Dr. Jamie Dejesus

## 2025-07-14 NOTE — ASU DISCHARGE PLAN (ADULT/PEDIATRIC) - CARE PROVIDER_API CALL
Glenn Gu  Clinical Cardiac Electrophysiology  [address] [place] [address], [address] [place] [id number]  Phone: [phone]  Fax: [phone]  Scheduled Appointment: 08/27/2025 02:45 PM

## 2025-07-15 LAB
ALBUMIN SERPL ELPH-MCNC: 4.4 G/DL
ALP BLD-CCNC: 53 U/L
ALT SERPL-CCNC: 25 U/L
ANION GAP SERPL CALC-SCNC: 14 MMOL/L
AST SERPL-CCNC: 22 U/L
BILIRUB SERPL-MCNC: 1.2 MG/DL
BUN SERPL-MCNC: 21 MG/DL
CALCIUM SERPL-MCNC: 9.3 MG/DL
CHLORIDE SERPL-SCNC: 107 MMOL/L
CO2 SERPL-SCNC: 19 MMOL/L
CREAT SERPL-MCNC: 1.52 MG/DL
EGFRCR SERPLBLD CKD-EPI 2021: 45 ML/MIN/1.73M2
GLUCOSE SERPL-MCNC: 134 MG/DL
HCT VFR BLD CALC: 38.9 %
HGB BLD-MCNC: 12.7 G/DL
INR PPP: 1.16 RATIO
MCHC RBC-ENTMCNC: 32.6 G/DL
MCHC RBC-ENTMCNC: 33 PG
MCV RBC AUTO: 101 FL
PLATELET # BLD AUTO: 203 K/UL
POTASSIUM SERPL-SCNC: 5 MMOL/L
PROT SERPL-MCNC: 6.6 G/DL
PT BLD: 13.7 SEC
RBC # BLD: 3.85 M/UL
RBC # FLD: 12.6 %
SODIUM SERPL-SCNC: 140 MMOL/L
WBC # FLD AUTO: 7.35 K/UL

## 2025-08-28 ENCOUNTER — APPOINTMENT (OUTPATIENT)
Dept: VASCULAR SURGERY | Facility: CLINIC | Age: 85
End: 2025-08-28
Payer: MEDICARE

## 2025-08-28 VITALS — BODY MASS INDEX: 33.18 KG/M2 | WEIGHT: 245 LBS | HEIGHT: 72 IN

## 2025-08-28 DIAGNOSIS — Z82.49 FAMILY HISTORY OF ISCHEMIC HEART DISEASE AND OTHER DISEASES OF THE CIRCULATORY SYSTEM: ICD-10-CM

## 2025-08-28 DIAGNOSIS — N18.9 CHRONIC KIDNEY DISEASE, UNSPECIFIED: ICD-10-CM

## 2025-08-28 DIAGNOSIS — M48.062 SPINAL STENOSIS, LUMBAR REGION WITH NEUROGENIC CLAUDICATION: ICD-10-CM

## 2025-08-28 DIAGNOSIS — Z87.891 PERSONAL HISTORY OF NICOTINE DEPENDENCE: ICD-10-CM

## 2025-08-28 LAB
ANION GAP SERPL CALC-SCNC: 16 MMOL/L
BUN SERPL-MCNC: 18 MG/DL
CALCIUM SERPL-MCNC: 9.2 MG/DL
CHLORIDE SERPL-SCNC: 108 MMOL/L
CO2 SERPL-SCNC: 21 MMOL/L
CREAT SERPL-MCNC: 1.41 MG/DL
EGFRCR SERPLBLD CKD-EPI 2021: 49 ML/MIN/1.73M2
GLUCOSE SERPL-MCNC: 157 MG/DL
POTASSIUM SERPL-SCNC: 5.4 MMOL/L
SODIUM SERPL-SCNC: 144 MMOL/L

## 2025-08-28 PROCEDURE — 93880 EXTRACRANIAL BILAT STUDY: CPT

## 2025-08-28 PROCEDURE — 93922 UPR/L XTREMITY ART 2 LEVELS: CPT

## 2025-08-28 PROCEDURE — 99205 OFFICE O/P NEW HI 60 MIN: CPT

## 2025-08-29 DIAGNOSIS — I65.23 OCCLUSION AND STENOSIS OF BILATERAL CAROTID ARTERIES: ICD-10-CM

## 2025-09-04 ENCOUNTER — APPOINTMENT (OUTPATIENT)
Dept: CT IMAGING | Facility: HOSPITAL | Age: 85
End: 2025-09-04

## (undated) DEVICE — GLV 8.5 PROTEXIS (WHITE)

## (undated) DEVICE — SUT SILK 0 30" TIES

## (undated) DEVICE — SUT PROLENE 4-0 36" BB

## (undated) DEVICE — BLADE SCALPEL SAFETYLOCK #11

## (undated) DEVICE — GLV 8 PROTEXIS (WHITE)

## (undated) DEVICE — SPECIMEN CONTAINER 100ML

## (undated) DEVICE — LAP PAD 18 X 18"

## (undated) DEVICE — SAW BLADE MICROAIRE STERNUM 1X34X9.4MM

## (undated) DEVICE — SUT PROLENE 5-0 30" RB-2

## (undated) DEVICE — GOWN TRIMAX XXL

## (undated) DEVICE — TOURNIQUET SET 12FR (1 RED, 1 BLUE, 1 SNARE) 7"

## (undated) DEVICE — VENODYNE/SCD SLEEVE CALF MEDIUM

## (undated) DEVICE — MNFLD SETUP

## (undated) DEVICE — GLV 8 PROTEGRITY

## (undated) DEVICE — GLV 7.5 PROTEXIS (WHITE)

## (undated) DEVICE — DRSG OPSITE 13.75 X 4"

## (undated) DEVICE — POSITIONER FOAM EGG CRATE ULNAR 2PCS (PINK)

## (undated) DEVICE — PACING CABLE TEMP MEDTRONIC WITH PAC-LOC

## (undated) DEVICE — SOL NORMOSOL-R PH7.4 1000ML

## (undated) DEVICE — SUT TICRON 5 30" KV-40

## (undated) DEVICE — TUBING CONTRAST INJECTION HIGH PRESSURE 1200PSI 72"

## (undated) DEVICE — BLADE SCALPEL SAFETYLOCK #15

## (undated) DEVICE — ELCTR REM POLYHESIVE ADULT PT RETURN 15FT

## (undated) DEVICE — SAW BLADE MICROAIRE STERNUM 1.1X50X42MM

## (undated) DEVICE — GOWN TRIMAX LG

## (undated) DEVICE — SOL IRR POUR H2O 250ML

## (undated) DEVICE — STOPCOCK 3-WAY

## (undated) DEVICE — DRAPE TOWEL BLUE 17" X 24"

## (undated) DEVICE — SOL IRR POUR NS 0.9% 500ML

## (undated) DEVICE — FOLEY TRAY 16FR 5CC LF LUBRISIL ADVANCE TEMP CLOSED

## (undated) DEVICE — DRAPE INSTRUMENT POUCH 6.75" X 11"

## (undated) DEVICE — DRAPE FEMORAL ANGIOGRAPHY W TROUGH

## (undated) DEVICE — BLADE SCALPEL SAFETYLOCK #10

## (undated) DEVICE — DRAPE 3/4 SHEET W REINFORCEMENT 56X77"

## (undated) DEVICE — CONNECTOR STRAIGHT 3/8 X 3/8" W LUER LOCK

## (undated) DEVICE — SUCTION YANKAUER NO CONTROL VENT

## (undated) DEVICE — SUT PROLENE 5-0 36" RB-1

## (undated) DEVICE — ELCTR BOVIE PENCIL HANDPIECE ROCKER SWITCH 15FT

## (undated) DEVICE — VISITEC 4X4

## (undated) DEVICE — ELCTR BOVIE TIP BLADE VALLEYLAB 6.5"

## (undated) DEVICE — Device

## (undated) DEVICE — DRAPE 1/2 SHEET 40X57"

## (undated) DEVICE — SUT BIOSYN 4-0 18" P-12

## (undated) DEVICE — GLV 8 RADIATION

## (undated) DEVICE — SUT SOFSILK 0 30" V-20

## (undated) DEVICE — WARMING BLANKET FULL UNDERBODY

## (undated) DEVICE — SAW BLADE STRYKER STERNUM 31MM X 6.27 X .79

## (undated) DEVICE — SYR ASEPTO

## (undated) DEVICE — ELCTR HEX BLADE

## (undated) DEVICE — DRAPE C ARM UNIVERSAL

## (undated) DEVICE — PACK UNIVERSAL CARDIAC

## (undated) DEVICE — VESSEL LOOP MAXI-RED  0.120" X 16"

## (undated) DEVICE — DRSG OPSITE 2.5 X 2"

## (undated) DEVICE — MEDICATION LABELS W MARKER

## (undated) DEVICE — DRAIN RESERVOIR FOR JACKSON PRATT 100CC CARDINAL

## (undated) DEVICE — GLV 7 PROTEXIS (WHITE)

## (undated) DEVICE — SUT POLYSORB 2-0 30" GS-21 UNDYED

## (undated) DEVICE — DRSG TEGADERM 6"X8"

## (undated) DEVICE — PREP CHLORAPREP HI-LITE ORANGE 26ML

## (undated) DEVICE — CONNECTOR STRAIGHT 3/8 X 1/2"